# Patient Record
Sex: FEMALE | Race: BLACK OR AFRICAN AMERICAN | NOT HISPANIC OR LATINO | Employment: OTHER | URBAN - METROPOLITAN AREA
[De-identification: names, ages, dates, MRNs, and addresses within clinical notes are randomized per-mention and may not be internally consistent; named-entity substitution may affect disease eponyms.]

---

## 2017-01-30 RX ORDER — ALPRAZOLAM 1 MG/1
1 TABLET ORAL NIGHTLY
Qty: 90 TABLET | Refills: 1 | Status: SHIPPED | OUTPATIENT
Start: 2017-01-30 | End: 2017-05-16 | Stop reason: SDUPTHER

## 2017-01-30 NOTE — TELEPHONE ENCOUNTER
----- Message from Joyd Schwab sent at 1/30/2017  2:38 PM CST -----  Contact: Patient  Refill    alprazolam (XANAX) 1 MG tablet 90 tablet 1 3/29/2016  No  Sig - Route: Take 1 tablet (1 mg total) by mouth nightly. - Oral    90 day fills    [facility] [address] [phone] (Phone) [phone] (Fax)    You can reach the patient at [phone], cell [phone].    Thanks!

## 2017-03-15 RX ORDER — ERGOCALCIFEROL 1.25 MG/1
CAPSULE ORAL
Qty: 4 CAPSULE | Refills: 11 | Status: SHIPPED | OUTPATIENT
Start: 2017-03-15 | End: 2018-03-30 | Stop reason: SDUPTHER

## 2017-04-19 ENCOUNTER — TELEPHONE (OUTPATIENT)
Dept: INTERNAL MEDICINE | Facility: CLINIC | Age: 72
End: 2017-04-19

## 2017-04-19 DIAGNOSIS — I10 ESSENTIAL HYPERTENSION: ICD-10-CM

## 2017-04-19 DIAGNOSIS — Z12.31 ENCOUNTER FOR SCREENING MAMMOGRAM FOR MALIGNANT NEOPLASM OF BREAST: Primary | ICD-10-CM

## 2017-04-19 DIAGNOSIS — R73.03 PRE-DIABETES: ICD-10-CM

## 2017-04-19 NOTE — TELEPHONE ENCOUNTER
----- Message from Belen Liza sent at 4/19/2017  2:12 PM CDT -----  Contact: call  [phone] or cell  [phone]  Patient would like to get a referral.  Does the patient already have the specialty clinic appointment scheduled:    If yes, what date is the appointment scheduled:     Referral to what specialty:  mammo  Reason (be specific):  annual  Does the patient want the referral with a specific physician:  [facility]  [facility]   Comments:  Pt would like to have done on mon may 1, 17

## 2017-04-19 NOTE — TELEPHONE ENCOUNTER
----- Message from Belen De Jesus sent at 4/19/2017  2:14 PM CDT -----  Contact: [phone]  Doctor appointment and lab have been scheduled.  Please link lab orders to the lab appointment.  Date of doctor appointment:    Physical or EP:    Date of lab appointment:  05/01/2017  Comments: pt called and stated that it was time for her to have labs done,

## 2017-05-01 ENCOUNTER — HOSPITAL ENCOUNTER (OUTPATIENT)
Dept: RADIOLOGY | Facility: HOSPITAL | Age: 72
Discharge: HOME | End: 2017-05-01
Attending: INTERNAL MEDICINE
Payer: MEDICARE

## 2017-05-01 DIAGNOSIS — Z12.31 ENCOUNTER FOR SCREENING MAMMOGRAM FOR MALIGNANT NEOPLASM OF BREAST: ICD-10-CM

## 2017-05-01 PROCEDURE — 77067 SCR MAMMO BI INCL CAD: CPT | Mod: 26,,, | Performed by: RADIOLOGY

## 2017-05-01 PROCEDURE — 77067 SCR MAMMO BI INCL CAD: CPT | Mod: TC

## 2017-05-16 ENCOUNTER — OFFICE VISIT (OUTPATIENT)
Dept: INTERNAL MEDICINE | Facility: CLINIC | Age: 72
End: 2017-05-16
Payer: MEDICARE

## 2017-05-16 VITALS
HEART RATE: 55 BPM | DIASTOLIC BLOOD PRESSURE: 80 MMHG | HEIGHT: 65 IN | SYSTOLIC BLOOD PRESSURE: 140 MMHG | BODY MASS INDEX: 34.27 KG/M2 | OXYGEN SATURATION: 98 % | WEIGHT: 205.69 LBS

## 2017-05-16 DIAGNOSIS — Z86.39 HISTORY OF DIABETES MELLITUS, TYPE II: Primary | ICD-10-CM

## 2017-05-16 DIAGNOSIS — E78.5 HYPERLIPIDEMIA, UNSPECIFIED HYPERLIPIDEMIA TYPE: ICD-10-CM

## 2017-05-16 DIAGNOSIS — E11.59 HYPERTENSION ASSOCIATED WITH DIABETES: ICD-10-CM

## 2017-05-16 DIAGNOSIS — I15.2 HYPERTENSION ASSOCIATED WITH DIABETES: ICD-10-CM

## 2017-05-16 DIAGNOSIS — F41.9 ANXIETY: ICD-10-CM

## 2017-05-16 PROCEDURE — 99999 PR PBB SHADOW E&M-EST. PATIENT-LVL III: CPT | Mod: PBBFAC,,, | Performed by: INTERNAL MEDICINE

## 2017-05-16 PROCEDURE — 99213 OFFICE O/P EST LOW 20 MIN: CPT | Mod: PBBFAC | Performed by: INTERNAL MEDICINE

## 2017-05-16 PROCEDURE — 99215 OFFICE O/P EST HI 40 MIN: CPT | Mod: S$PBB,,, | Performed by: INTERNAL MEDICINE

## 2017-05-16 RX ORDER — ALPRAZOLAM 1 MG/1
1 TABLET ORAL NIGHTLY
Qty: 90 TABLET | Refills: 1 | Status: SHIPPED | OUTPATIENT
Start: 2017-05-16 | End: 2017-12-27 | Stop reason: SDUPTHER

## 2017-05-16 RX ORDER — OMEPRAZOLE 20 MG/1
20 CAPSULE, DELAYED RELEASE ORAL DAILY
COMMUNITY
End: 2019-04-15 | Stop reason: SDUPTHER

## 2017-05-16 RX ORDER — LOSARTAN POTASSIUM AND HYDROCHLOROTHIAZIDE 25; 100 MG/1; MG/1
1 TABLET ORAL DAILY
Qty: 90 TABLET | Refills: 3 | Status: SHIPPED | OUTPATIENT
Start: 2017-05-16 | End: 2018-04-20 | Stop reason: SDUPTHER

## 2017-05-16 RX ORDER — CARVEDILOL 25 MG/1
25 TABLET ORAL 2 TIMES DAILY WITH MEALS
Qty: 60 TABLET | Refills: 11 | Status: SHIPPED | OUTPATIENT
Start: 2017-05-16 | End: 2018-03-05 | Stop reason: SDUPTHER

## 2017-05-16 RX ORDER — ATENOLOL 50 MG/1
50 TABLET ORAL DAILY
Qty: 90 TABLET | Refills: 3 | Status: CANCELLED | OUTPATIENT
Start: 2017-05-16

## 2017-05-16 NOTE — PROGRESS NOTES
"Subjective:       Patient ID: Caprice Gutierrez is a 71 y.o. female.    Chief Complaint:  F/u htn, carbohydrate intolerance  HPI   Long h/o htn and pre-diabetes.     She hasn't checked BP.     Glucose always below 112.  Eating freely.     BMI 34 but wt fairly stable.    Stressed about grand daughter who is moving to [place] with boyfriend.    Gerd.  Taking otc omeprazole.    Takes a "crumb" of xanax every morning only.  Review of Systems   Constitutional: Negative for fever and unexpected weight change.   HENT: Positive for voice change. Negative for congestion and postnasal drip.    Eyes: Negative for pain, discharge and visual disturbance.   Respiratory: Negative for cough, chest tightness, shortness of breath and wheezing.    Cardiovascular: Negative for chest pain and leg swelling.   Gastrointestinal: Negative for abdominal pain, anal bleeding, constipation, diarrhea, nausea and vomiting.   Genitourinary: Negative for difficulty urinating, dysuria, flank pain, frequency, hematuria and urgency.   Musculoskeletal: Positive for arthralgias (R hip pain on occasion.  ). Negative for back pain and neck pain.   Skin: Negative for rash.   Neurological: Negative for headaches.   Psychiatric/Behavioral: Negative for dysphoric mood and sleep disturbance. The patient is nervous/anxious (A worrier).        Objective:    repeat BP  18/80  Physical Exam   Constitutional: She is oriented to person, place, and time. She appears well-developed and well-nourished. No distress.   HENT:   Head: Normocephalic and atraumatic.   Right Ear: External ear normal.   Left Ear: External ear normal.   Nose: Nose normal.   Mouth/Throat: Oropharynx is clear and moist.   Eyes: Conjunctivae and EOM are normal. Pupils are equal, round, and reactive to light. Right eye exhibits no discharge. Left eye exhibits no discharge. No scleral icterus.   Neck: Normal range of motion. Neck supple. No JVD present. No thyromegaly present. "   Cardiovascular: Normal rate, regular rhythm and normal heart sounds.  Exam reveals no gallop.    No murmur heard.  Pulmonary/Chest: Effort normal and breath sounds normal. No respiratory distress. She has no wheezes. She has no rales.   Abdominal: Soft. Bowel sounds are normal. She exhibits no distension and no mass. There is no tenderness. There is no rebound and no guarding.   Genitourinary: No breast tenderness, discharge or bleeding.   Musculoskeletal: Normal range of motion. She exhibits no edema or tenderness.   Lymphadenopathy:     She has no cervical adenopathy.   Neurological: She is alert and oriented to person, place, and time. No cranial nerve deficit. Coordination normal.   Skin: Skin is warm and dry. No rash noted.   Psychiatric: She has a normal mood and affect. Her behavior is normal. Judgment and thought content normal.       Results for orders placed or performed in visit on 05/01/17   Hemoglobin A1c   Result Value Ref Range    Hemoglobin A1C 6.1 4.5 - 6.2 %    Estimated Avg Glucose 128 68 - 131 mg/dL   Basic metabolic panel   Result Value Ref Range    Sodium 140 136 - 145 mmol/L    Potassium 4.1 3.5 - 5.1 mmol/L    Chloride 100 95 - 110 mmol/L    CO2 29 23 - 29 mmol/L    Glucose 105 70 - 110 mg/dL    BUN, Bld 19 8 - 23 mg/dL    Creatinine 1.0 0.5 - 1.4 mg/dL    Calcium 9.5 8.7 - 10.5 mg/dL    Anion Gap 11 8 - 16 mmol/L    eGFR if African American >60 >60 mL/min/1.73 m^2    eGFR if non African American 57 (A) >60 mL/min/1.73 m^2     Assessment:       1. History of diabetes mellitus, type II    2. Hypertension associated with diabetes    3. Hyperlipidemia, unspecified hyperlipidemia type    4. Anxiety        Plan:       Caprice was seen today for annual exam.    Diagnoses and all orders for this visit:    History of diabetes mellitus, type II    Hypertension associated with diabetes    Hyperlipidemia, unspecified hyperlipidemia type    Anxiety    Other orders  -     losartan-hydrochlorothiazide  100-25 mg (HYZAAR) 100-25 mg per tablet; Take 1 tablet by mouth once daily.  -     alprazolam (XANAX) 1 MG tablet; Take 1 tablet (1 mg total) by mouth nightly.  -     Cancel: atenolol (TENORMIN) 50 MG tablet; Take 1 tablet (50 mg total) by mouth once daily.  -     carvedilol (COREG) 25 MG tablet; Take 1 tablet (25 mg total) by mouth 2 (two) times daily with meals.       Ophthalmologist: Dr Herman Song

## 2017-05-16 NOTE — MR AVS SNAPSHOT
[facility]  [address]  [facility] [address]  Phone: [phone]  Fax: [phone]                  Caprice Gutierrez   2017 10:00 AM   Office Visit    Description:  Female : 1945   Provider:  Nayely Garcia MD   Department:  [facility]           Reason for Visit     Annual Exam           Diagnoses this Visit        Comments    History of diabetes mellitus, type II    -  Primary     Hypertension associated with diabetes         Hyperlipidemia, unspecified hyperlipidemia type         Anxiety                To Do List           Future Appointments        Provider Department Dept Phone    2017 9:10 AM Nayely Garcia MD [facility] [phone]      Goals (5 Years of Data)     None      Follow-Up and Disposition     Return in about 6 weeks (around 2017).       These Medications        Disp Refills Start End    losartan-hydrochlorothiazide 100-25 mg (HYZAAR) 100-25 mg per tablet 90 tablet 3 2017     Take 1 tablet by mouth once daily. - Oral    Pharmacy: [place] [id number] Ph #: [phone]       alprazolam (XANAX) 1 MG tablet 90 tablet 1 2017     Take 1 tablet (1 mg total) by mouth nightly. - Oral    Pharmacy: [place] [id number] Ph #: [phone]       carvedilol (COREG) 25 MG tablet 60 tablet 11 2017    Take 1 tablet (25 mg total) by mouth 2 (two) times daily with meals. - Oral    Pharmacy: [place] [id number] Ph #: [phone]         [facility]     [facility] On Call Nurse Care Line -  Assistance  Unless otherwise directed by your provider, please contact [facility], our nurse care line that is available for  assistance.     Registered nurses in the [facility] provide: appointment scheduling, clinical  advisement, health education, and other advisory services.  Call: [phone] (toll free)               Medications           Message regarding Medications     Verify the changes and/or additions to your medication regime listed below are the same as discussed with your clinician today.  If any of these changes or additions are incorrect, please notify your healthcare provider.        START taking these NEW medications        Refills    carvedilol (COREG) 25 MG tablet 11    Sig: Take 1 tablet (25 mg total) by mouth 2 (two) times daily with meals.    Class: Normal    Route: Oral      CHANGE how you are taking these medications     Start Taking Instead of    losartan-hydrochlorothiazide 100-25 mg (HYZAAR) 100-25 mg per tablet losartan-hydrochlorothiazide 100-25 mg (HYZAAR) 100-25 mg per tablet    Dosage:  Take 1 tablet by mouth once daily. Dosage:  TAKE 1 TABLET BY MOUTH EVERY DAY    Reason for Change:  Reorder       STOP taking these medications     esomeprazole (NEXIUM) 40 MG capsule TAKE 1 CAPSULE BY MOUTH EVERY DAY BEFORE BREAKFAST    meloxicam (MOBIC) 7.5 MG tablet     atenolol (TENORMIN) 50 MG tablet TAKE 1 TABLET BY MOUTH EVERY DAY           Verify that the below list of medications is an accurate representation of the medications you are currently taking.  If none reported, the list may be blank. If incorrect, please contact your healthcare provider. Carry this list with you in case of emergency.           Current Medications     alprazolam (XANAX) 1 MG tablet Take 1 tablet (1 mg total) by mouth nightly.    CONTOUR TEST STRIPS Strp TEST 1-3 TIMES DAILY AS DIRECTED    lancets (MICROLET LANCET) Misc 1 lancet by Misc.(Non-Drug; Combo Route) route 3 (three) times daily.    losartan-hydrochlorothiazide 100-25 mg (HYZAAR) 100-25 mg per tablet Take 1 tablet by mouth once daily.    omeprazole (PRILOSEC) 20 MG capsule Take 20 mg by mouth once daily.    VITAMIN D2 50,000 unit capsule TAKE 1 CAPSULE BY MOUTH BY MOUTH  "EVERY 7 DAYS    carvedilol (COREG) 25 MG tablet Take 1 tablet (25 mg total) by mouth 2 (two) times daily with meals.           Clinical Reference Information           Your Vitals Were     BP Pulse Height Weight SpO2 BMI    140/80 (BP Location: Right arm, Patient Position: Sitting, BP Method: Manual) 55 5' 5" (1.651 m) 93.3 kg (205 lb 11 oz) 98% 34.23 kg/m2      Blood Pressure          Most Recent Value    BP  (!)  140/80      Allergies as of 5/16/2017     Ace Inhibitors    Lisinopril      Immunizations Administered on Date of Encounter - 5/16/2017     None      Instructions    Change Atenolol to Carvedilol - 1 tab twice a day.       Language Assistance Services     ATTENTION: Language assistance services are available, free of charge. Please call [phone].      ATENCIÓN: Si allenla jean, tiene a gatica disposición servicios gratuitos de asistencia lingüística. Llame al [phone].     CHÚ Ý: N?u b?n nói Ti?ng Vi?t, có các d?ch v? h? tr? ngôn ng? mi?n phí dành cho b?n. G?i s? [phone].         [facility] complies with applicable Federal civil rights laws and does not discriminate on the basis of race, color, national origin, age, disability, or sex.        "

## 2017-06-26 ENCOUNTER — OFFICE VISIT (OUTPATIENT)
Dept: INTERNAL MEDICINE | Facility: CLINIC | Age: 72
End: 2017-06-26
Payer: MEDICARE

## 2017-06-26 VITALS
DIASTOLIC BLOOD PRESSURE: 82 MMHG | BODY MASS INDEX: 34.89 KG/M2 | HEIGHT: 65 IN | OXYGEN SATURATION: 98 % | HEART RATE: 64 BPM | WEIGHT: 209.44 LBS | SYSTOLIC BLOOD PRESSURE: 132 MMHG

## 2017-06-26 DIAGNOSIS — I15.2 HYPERTENSION ASSOCIATED WITH DIABETES: Primary | ICD-10-CM

## 2017-06-26 DIAGNOSIS — R53.83 FATIGUE, UNSPECIFIED TYPE: ICD-10-CM

## 2017-06-26 DIAGNOSIS — E11.59 HYPERTENSION ASSOCIATED WITH DIABETES: Primary | ICD-10-CM

## 2017-06-26 PROCEDURE — 99213 OFFICE O/P EST LOW 20 MIN: CPT | Mod: S$PBB,,, | Performed by: INTERNAL MEDICINE

## 2017-06-26 PROCEDURE — 99213 OFFICE O/P EST LOW 20 MIN: CPT | Mod: PBBFAC | Performed by: INTERNAL MEDICINE

## 2017-06-26 PROCEDURE — 1159F MED LIST DOCD IN RCRD: CPT | Mod: ,,, | Performed by: INTERNAL MEDICINE

## 2017-06-26 PROCEDURE — 3044F HG A1C LEVEL LT 7.0%: CPT | Mod: ,,, | Performed by: INTERNAL MEDICINE

## 2017-06-26 PROCEDURE — 1126F AMNT PAIN NOTED NONE PRSNT: CPT | Mod: ,,, | Performed by: INTERNAL MEDICINE

## 2017-06-26 PROCEDURE — 4010F ACE/ARB THERAPY RXD/TAKEN: CPT | Mod: ,,, | Performed by: INTERNAL MEDICINE

## 2017-06-26 PROCEDURE — 99999 PR PBB SHADOW E&M-EST. PATIENT-LVL III: CPT | Mod: PBBFAC,,, | Performed by: INTERNAL MEDICINE

## 2017-06-26 NOTE — PROGRESS NOTES
Subjective:       Patient ID: Caprice Gutierrez is a 71 y.o. female.    Chief Complaint: Follow-up (History of diabetes mellitus, type II)    HPI   She c/o sleeping excessively with carvedilol. She also has to take it with food, which she believes makes her eat more..      Harder to get up in am and go for her walk since starting carvedilol.        However, BP is controlled  Review of Systems   Constitutional: Negative for activity change, fever and unexpected weight change.   HENT: Negative for congestion and postnasal drip.    Eyes: Negative for pain, discharge and visual disturbance.   Respiratory: Negative for cough, chest tightness, shortness of breath and wheezing.    Cardiovascular: Negative for chest pain and leg swelling.   Gastrointestinal: Negative for abdominal pain, constipation, diarrhea and nausea.   Genitourinary: Negative for difficulty urinating, dysuria and hematuria.   Musculoskeletal: Negative for back pain.   Skin: Negative for rash.   Neurological: Negative for headaches.   Psychiatric/Behavioral: Negative for dysphoric mood and sleep disturbance. The patient is not nervous/anxious.        Objective:      Physical Exam   Constitutional: She appears well-developed and well-nourished. No distress.   Psychiatric: She has a normal mood and affect. Her behavior is normal.       Assessment:       1. Hypertension associated with diabetes    2. Fatigue, unspecified type        Plan:       Caprice was seen today for follow-up.    Diagnoses and all orders for this visit:    Hypertension associated with diabetes    Fatigue, unspecified type       Carvedilol SR not covered by her insurance, so decrease carvedilol to half a tab bid.  If BP not controlled start a CCB.

## 2017-08-28 ENCOUNTER — OFFICE VISIT (OUTPATIENT)
Dept: INTERNAL MEDICINE | Facility: CLINIC | Age: 72
End: 2017-08-28
Payer: MEDICARE

## 2017-08-28 VITALS
WEIGHT: 209.5 LBS | BODY MASS INDEX: 34.91 KG/M2 | SYSTOLIC BLOOD PRESSURE: 124 MMHG | DIASTOLIC BLOOD PRESSURE: 78 MMHG | HEART RATE: 64 BPM | HEIGHT: 65 IN

## 2017-08-28 DIAGNOSIS — E11.59 HYPERTENSION ASSOCIATED WITH DIABETES: Primary | ICD-10-CM

## 2017-08-28 DIAGNOSIS — Z11.59 NEED FOR HEPATITIS C SCREENING TEST: ICD-10-CM

## 2017-08-28 DIAGNOSIS — I15.2 HYPERTENSION ASSOCIATED WITH DIABETES: Primary | ICD-10-CM

## 2017-08-28 DIAGNOSIS — E78.5 HYPERLIPIDEMIA, UNSPECIFIED HYPERLIPIDEMIA TYPE: ICD-10-CM

## 2017-08-28 DIAGNOSIS — R73.03 PREDIABETES: ICD-10-CM

## 2017-08-28 PROCEDURE — 4010F ACE/ARB THERAPY RXD/TAKEN: CPT | Mod: ,,, | Performed by: INTERNAL MEDICINE

## 2017-08-28 PROCEDURE — 99214 OFFICE O/P EST MOD 30 MIN: CPT | Mod: S$PBB,,, | Performed by: INTERNAL MEDICINE

## 2017-08-28 PROCEDURE — 3074F SYST BP LT 130 MM HG: CPT | Mod: ,,, | Performed by: INTERNAL MEDICINE

## 2017-08-28 PROCEDURE — 99999 PR PBB SHADOW E&M-EST. PATIENT-LVL III: CPT | Mod: PBBFAC,,, | Performed by: INTERNAL MEDICINE

## 2017-08-28 PROCEDURE — 3078F DIAST BP <80 MM HG: CPT | Mod: ,,, | Performed by: INTERNAL MEDICINE

## 2017-08-28 PROCEDURE — 3044F HG A1C LEVEL LT 7.0%: CPT | Mod: ,,, | Performed by: INTERNAL MEDICINE

## 2017-08-28 PROCEDURE — 1159F MED LIST DOCD IN RCRD: CPT | Mod: ,,, | Performed by: INTERNAL MEDICINE

## 2017-08-28 PROCEDURE — 1126F AMNT PAIN NOTED NONE PRSNT: CPT | Mod: ,,, | Performed by: INTERNAL MEDICINE

## 2017-08-28 PROCEDURE — 99213 OFFICE O/P EST LOW 20 MIN: CPT | Mod: PBBFAC | Performed by: INTERNAL MEDICINE

## 2017-08-28 NOTE — PROGRESS NOTES
Subjective:       Patient ID: Caprice Gutierrez is a 71 y.o. female.    Chief Complaint: Hypertension and Finger Pain (Left Hand)    HPI   Doing well.  BP well controlled, and fatigue better with carvedilol - half a tab bid    Also c/o sticking L 3rd finger.  Doesn't want intervention.    Taking omeprazole.  No Gerd, abd pain.    Frustrated by wt.  Trying to lose some.    Borderline DM.  No foot numbness.    BMI 35.  Walking , working in garden.    Walking 5 days a week.      Review of Systems   Constitutional: Negative for fever and unexpected weight change.   HENT: Negative for congestion and postnasal drip.    Eyes: Negative for pain, discharge and visual disturbance.   Respiratory: Negative for cough, chest tightness, shortness of breath and wheezing.    Cardiovascular: Negative for chest pain and leg swelling.   Gastrointestinal: Negative for abdominal pain, constipation, diarrhea and nausea.   Genitourinary: Negative for difficulty urinating, dysuria and hematuria.   Skin: Negative for rash.   Neurological: Negative for headaches.   Psychiatric/Behavioral: Negative for dysphoric mood and sleep disturbance. The patient is not nervous/anxious.        Objective:      Physical Exam   Constitutional: She is oriented to person, place, and time. She appears well-developed and well-nourished.   Eyes: No scleral icterus.   Neck: No JVD present. No thyromegaly present.   Cardiovascular: Normal rate, regular rhythm and normal heart sounds.    Pulmonary/Chest: Effort normal and breath sounds normal. No respiratory distress. She has no wheezes. She has no rales.   Abdominal: Soft. She exhibits no mass. There is no tenderness.   Musculoskeletal: She exhibits no edema.   Neurological: She is alert and oriented to person, place, and time.   Psychiatric: She has a normal mood and affect. Her behavior is normal.       Protective Sensation (w/ 10 gram monofilament):  Right: Intact  Left: Intact    Visual Inspection:  Normal  -  Bilateral    Pedal Pulses:   Right: Present  Left: Present    Posterior tibialis:   Right:Absent  Left: Absent    Assessment:       1. Hypertension associated with diabetes    2. Prediabetes    3. Hyperlipidemia, unspecified hyperlipidemia type    4. Need for hepatitis C screening test    5. BMI 34.0-34.9,adult        Plan:       Caprice was seen today for hypertension and finger pain.    Diagnoses and all orders for this visit:    Hypertension associated with diabetes    Prediabetes  -     Comprehensive metabolic panel; Future  -     CBC auto differential; Future  -     Hemoglobin A1c; Future    Hyperlipidemia, unspecified hyperlipidemia type  -     Lipid panel; Future    Need for hepatitis C screening test  -     Hepatitis C antibody; Future    BMI 34.0-34.9,adult       wt loss reviewed

## 2017-12-27 RX ORDER — ALPRAZOLAM 1 MG/1
TABLET ORAL
Qty: 90 TABLET | Refills: 1 | Status: SHIPPED | OUTPATIENT
Start: 2017-12-27 | End: 2018-03-05 | Stop reason: SDUPTHER

## 2017-12-27 NOTE — TELEPHONE ENCOUNTER
----- Message from Cande Wilson sent at 12/27/2017  3:40 PM CST -----  Contact: Tracy stephens [phone]  RX request - refill or new RX.  Is this a refill or new RX:  Refill   RX name and strength: alprazolam (XANAX) 1 MG tablet  Directions: Take 1 tablet (1 mg total) by mouth nightly. - Oral  Is this a 30 day or 90 day RX:    Pharmacy name and phone # : [facility] [phone] (phone) [phone] (Fax)  Comments:

## 2018-02-21 ENCOUNTER — LAB VISIT (OUTPATIENT)
Dept: LAB | Facility: HOSPITAL | Age: 73
End: 2018-02-21
Attending: INTERNAL MEDICINE
Payer: MEDICARE

## 2018-02-21 DIAGNOSIS — E78.5 HYPERLIPIDEMIA, UNSPECIFIED HYPERLIPIDEMIA TYPE: ICD-10-CM

## 2018-02-21 DIAGNOSIS — Z11.59 NEED FOR HEPATITIS C SCREENING TEST: ICD-10-CM

## 2018-02-21 DIAGNOSIS — R73.03 PREDIABETES: ICD-10-CM

## 2018-02-21 LAB
ALBUMIN SERPL BCP-MCNC: 3.7 G/DL
ALP SERPL-CCNC: 70 U/L
ALT SERPL W/O P-5'-P-CCNC: 13 U/L
ANION GAP SERPL CALC-SCNC: 8 MMOL/L
AST SERPL-CCNC: 15 U/L
BASOPHILS # BLD AUTO: 0.03 K/UL
BASOPHILS NFR BLD: 0.6 %
BILIRUB SERPL-MCNC: 0.7 MG/DL
BUN SERPL-MCNC: 15 MG/DL
CALCIUM SERPL-MCNC: 9.5 MG/DL
CHLORIDE SERPL-SCNC: 100 MMOL/L
CHOLEST SERPL-MCNC: 203 MG/DL
CHOLEST/HDLC SERPL: 3.1 {RATIO}
CO2 SERPL-SCNC: 33 MMOL/L
CREAT SERPL-MCNC: 1 MG/DL
DIFFERENTIAL METHOD: ABNORMAL
EOSINOPHIL # BLD AUTO: 0.1 K/UL
EOSINOPHIL NFR BLD: 2.5 %
ERYTHROCYTE [DISTWIDTH] IN BLOOD BY AUTOMATED COUNT: 16.7 %
EST. GFR  (AFRICAN AMERICAN): >60 ML/MIN/1.73 M^2
EST. GFR  (NON AFRICAN AMERICAN): 56 ML/MIN/1.73 M^2
ESTIMATED AVG GLUCOSE: 120 MG/DL
GLUCOSE SERPL-MCNC: 103 MG/DL
HBA1C MFR BLD HPLC: 5.8 %
HCT VFR BLD AUTO: 34.5 %
HCV AB SERPL QL IA: NEGATIVE
HDLC SERPL-MCNC: 65 MG/DL
HDLC SERPL: 32 %
HGB BLD-MCNC: 11.4 G/DL
LDLC SERPL CALC-MCNC: 123.8 MG/DL
LYMPHOCYTES # BLD AUTO: 1.7 K/UL
LYMPHOCYTES NFR BLD: 33.8 %
MCH RBC QN AUTO: 28.3 PG
MCHC RBC AUTO-ENTMCNC: 33 G/DL
MCV RBC AUTO: 86 FL
MONOCYTES # BLD AUTO: 0.6 K/UL
MONOCYTES NFR BLD: 12.2 %
NEUTROPHILS # BLD AUTO: 2.6 K/UL
NEUTROPHILS NFR BLD: 50.7 %
NONHDLC SERPL-MCNC: 138 MG/DL
NRBC BLD-RTO: 0 /100 WBC
PLATELET # BLD AUTO: 329 K/UL
PMV BLD AUTO: 10.1 FL
POTASSIUM SERPL-SCNC: 3 MMOL/L
PROT SERPL-MCNC: 7.4 G/DL
RBC # BLD AUTO: 4.03 M/UL
SODIUM SERPL-SCNC: 141 MMOL/L
TRIGL SERPL-MCNC: 71 MG/DL
WBC # BLD AUTO: 5.15 K/UL

## 2018-02-21 PROCEDURE — 83036 HEMOGLOBIN GLYCOSYLATED A1C: CPT

## 2018-02-21 PROCEDURE — 80053 COMPREHEN METABOLIC PANEL: CPT

## 2018-02-21 PROCEDURE — 80061 LIPID PANEL: CPT

## 2018-02-21 PROCEDURE — 36415 COLL VENOUS BLD VENIPUNCTURE: CPT

## 2018-02-21 PROCEDURE — 86803 HEPATITIS C AB TEST: CPT

## 2018-02-21 PROCEDURE — 85025 COMPLETE CBC W/AUTO DIFF WBC: CPT

## 2018-02-23 ENCOUNTER — TELEPHONE (OUTPATIENT)
Dept: INTERNAL MEDICINE | Facility: CLINIC | Age: 73
End: 2018-02-23

## 2018-02-23 NOTE — TELEPHONE ENCOUNTER
Attempted to contact pt no success, left voice message.  Pt need f/u appt. Mailed out letter to call and schedule appt.

## 2018-02-23 NOTE — LETTER
February 23, 2018    Caprice Gutierrez  [address]  [place] [address]                                         [facility]  [address] [place] [address]                                 Dear Mrs. Gutierrez,     I have been unable to contact you by phone to discuss scheduling an appointment for a follow up visit with Dr. Nayely Garcia MD. At your earliest availability please give the office a call at [phone].        I look forward to hearing from you to schedule the appointment.      Sincerely,    Sheryl Bedolla   [facility] INTER[facility]  [facility] [place]

## 2018-03-05 ENCOUNTER — OFFICE VISIT (OUTPATIENT)
Dept: INTERNAL MEDICINE | Facility: CLINIC | Age: 73
End: 2018-03-05
Payer: MEDICARE

## 2018-03-05 VITALS
HEIGHT: 65 IN | SYSTOLIC BLOOD PRESSURE: 125 MMHG | BODY MASS INDEX: 33.98 KG/M2 | DIASTOLIC BLOOD PRESSURE: 80 MMHG | TEMPERATURE: 99 F | WEIGHT: 203.94 LBS | HEART RATE: 72 BPM

## 2018-03-05 DIAGNOSIS — I10 HYPERTENSION, UNSPECIFIED TYPE: Primary | ICD-10-CM

## 2018-03-05 DIAGNOSIS — R73.03 PREDIABETES: ICD-10-CM

## 2018-03-05 DIAGNOSIS — D64.9 ANEMIA, UNSPECIFIED TYPE: ICD-10-CM

## 2018-03-05 DIAGNOSIS — E55.9 VITAMIN D DEFICIENCY: ICD-10-CM

## 2018-03-05 DIAGNOSIS — E87.6 HYPOKALEMIA: ICD-10-CM

## 2018-03-05 PROCEDURE — 99213 OFFICE O/P EST LOW 20 MIN: CPT | Mod: PBBFAC | Performed by: INTERNAL MEDICINE

## 2018-03-05 PROCEDURE — 99999 PR PBB SHADOW E&M-EST. PATIENT-LVL III: CPT | Mod: PBBFAC,,, | Performed by: INTERNAL MEDICINE

## 2018-03-05 PROCEDURE — 99214 OFFICE O/P EST MOD 30 MIN: CPT | Mod: S$PBB,,, | Performed by: INTERNAL MEDICINE

## 2018-03-05 RX ORDER — CARVEDILOL 12.5 MG/1
TABLET ORAL
Qty: 60 TABLET | Refills: 11 | Status: SHIPPED | OUTPATIENT
Start: 2018-03-05 | End: 2018-11-14 | Stop reason: SDUPTHER

## 2018-03-05 RX ORDER — ALPRAZOLAM 1 MG/1
1 TABLET ORAL NIGHTLY
Qty: 90 TABLET | Refills: 1 | Status: SHIPPED | OUTPATIENT
Start: 2018-03-05 | End: 2018-07-02 | Stop reason: SDUPTHER

## 2018-03-05 NOTE — PROGRESS NOTES
Subjective:       Patient ID: Caprice Gutierrez is a 72 y.o. female.    Chief Complaint: Follow-up    HPI   Long standing htn.  Taking half carvedilol bid.  Walking regularly.    She has h/o prediabetes.  Also with chronic xanax, which she takes occas, not daily.  Labs reviewed.  Eats banana daily.  H/o hypokalemia.      hgb 11.4.  No blood donation.  No change in bowel habits.    colonoscopy 7/2016.    gerd controlled with nexium.  Will buy otc.     Review of Systems   Constitutional: Negative for fever and unexpected weight change.   HENT: Negative for congestion and postnasal drip.    Eyes: Negative for pain, discharge and visual disturbance.   Respiratory: Negative for cough, chest tightness, shortness of breath and wheezing.    Cardiovascular: Negative for chest pain and leg swelling.   Gastrointestinal: Negative for abdominal pain, constipation, diarrhea and nausea.   Genitourinary: Negative for difficulty urinating, dysuria and hematuria.   Skin: Negative for rash.   Neurological: Negative for headaches.   Psychiatric/Behavioral: Negative for dysphoric mood and sleep disturbance. The patient is not nervous/anxious.        Objective:      Physical Exam   Constitutional: She is oriented to person, place, and time. She appears well-developed and well-nourished. No distress.   Eyes: Pupils are equal, round, and reactive to light.   Cardiovascular: Normal rate, regular rhythm and normal heart sounds.    Pulmonary/Chest: Effort normal.   Neurological: She is alert and oriented to person, place, and time. No cranial nerve deficit.   Skin: No rash noted. No erythema.       Results for orders placed or performed in visit on 02/21/18   Comprehensive metabolic panel   Result Value Ref Range    Sodium 141 136 - 145 mmol/L    Potassium 3.0 (L) 3.5 - 5.1 mmol/L    Chloride 100 95 - 110 mmol/L    CO2 33 (H) 23 - 29 mmol/L    Glucose 103 70 - 110 mg/dL    BUN, Bld 15 8 - 23 mg/dL    Creatinine 1.0 0.5 - 1.4 mg/dL     Calcium 9.5 8.7 - 10.5 mg/dL    Total Protein 7.4 6.0 - 8.4 g/dL    Albumin 3.7 3.5 - 5.2 g/dL    Total Bilirubin 0.7 0.1 - 1.0 mg/dL    Alkaline Phosphatase 70 55 - 135 U/L    AST 15 10 - 40 U/L    ALT 13 10 - 44 U/L    Anion Gap 8 8 - 16 mmol/L    eGFR if African American >60 >60 mL/min/1.73 m^2    eGFR if non African American 56 (A) >60 mL/min/1.73 m^2   CBC auto differential   Result Value Ref Range    WBC 5.15 3.90 - 12.70 K/uL    RBC 4.03 4.00 - 5.40 M/uL    Hemoglobin 11.4 (L) 12.0 - 16.0 g/dL    Hematocrit 34.5 (L) 37.0 - 48.5 %    MCV 86 82 - 98 fL    MCH 28.3 27.0 - 31.0 pg    MCHC 33.0 32.0 - 36.0 g/dL    RDW 16.7 (H) 11.5 - 14.5 %    Platelets 329 150 - 350 K/uL    MPV 10.1 9.2 - 12.9 fL    Gran # (ANC) 2.6 1.8 - 7.7 K/uL    Lymph # 1.7 1.0 - 4.8 K/uL    Mono # 0.6 0.3 - 1.0 K/uL    Eos # 0.1 0.0 - 0.5 K/uL    Baso # 0.03 0.00 - 0.20 K/uL    nRBC 0 0 /100 WBC    Gran% 50.7 38.0 - 73.0 %    Lymph% 33.8 18.0 - 48.0 %    Mono% 12.2 4.0 - 15.0 %    Eosinophil% 2.5 0.0 - 8.0 %    Basophil% 0.6 0.0 - 1.9 %    Differential Method Automated    Hemoglobin A1c   Result Value Ref Range    Hemoglobin A1C 5.8 (H) 4.0 - 5.6 %    Estimated Avg Glucose 120 68 - 131 mg/dL   Lipid panel   Result Value Ref Range    Cholesterol 203 (H) 120 - 199 mg/dL    Triglycerides 71 30 - 150 mg/dL    HDL 65 40 - 75 mg/dL    LDL Cholesterol 123.8 63.0 - 159.0 mg/dL    HDL/Chol Ratio 32.0 20.0 - 50.0 %    Total Cholesterol/HDL Ratio 3.1 2.0 - 5.0    Non-HDL Cholesterol 138 mg/dL   Hepatitis C antibody   Result Value Ref Range    Hepatitis C Ab Negative      Assessment:       1. Hypertension, unspecified type    2. Hypokalemia    3. Anemia, unspecified type    4. Prediabetes    5. Vitamin D deficiency        Plan:       Caprice was seen today for follow-up.    Diagnoses and all orders for this visit:    Hypertension, unspecified type    Hypokalemia  -     Potassium; Future    Anemia, unspecified type  -     CBC auto differential;  Future  -     Ferritin; Future  -     Iron and TIBC; Future  -     Reticulocytes; Future    Prediabetes  -     Microalbumin/creatinine urine ratio    Vitamin D deficiency  -     Vitamin D; Future    Other orders  -     carvedilol (COREG) 12.5 MG tablet; 1 tab po bid  -     ALPRAZolam (XANAX) 1 MG tablet; Take 1 tablet (1 mg total) by mouth nightly.

## 2018-03-20 ENCOUNTER — LAB VISIT (OUTPATIENT)
Dept: LAB | Facility: HOSPITAL | Age: 73
End: 2018-03-20
Attending: INTERNAL MEDICINE
Payer: MEDICARE

## 2018-03-20 DIAGNOSIS — D64.9 ANEMIA, UNSPECIFIED TYPE: ICD-10-CM

## 2018-03-20 DIAGNOSIS — E55.9 VITAMIN D DEFICIENCY: ICD-10-CM

## 2018-03-20 DIAGNOSIS — E87.6 HYPOKALEMIA: ICD-10-CM

## 2018-03-20 LAB
25(OH)D3+25(OH)D2 SERPL-MCNC: 38 NG/ML
BASOPHILS # BLD AUTO: 0.03 K/UL
BASOPHILS NFR BLD: 0.6 %
DIFFERENTIAL METHOD: ABNORMAL
EOSINOPHIL # BLD AUTO: 0.2 K/UL
EOSINOPHIL NFR BLD: 3.2 %
ERYTHROCYTE [DISTWIDTH] IN BLOOD BY AUTOMATED COUNT: 16.1 %
FERRITIN SERPL-MCNC: 134 NG/ML
HCT VFR BLD AUTO: 35.5 %
HGB BLD-MCNC: 11.9 G/DL
IRON SERPL-MCNC: 53 UG/DL
LYMPHOCYTES # BLD AUTO: 1.6 K/UL
LYMPHOCYTES NFR BLD: 32.1 %
MCH RBC QN AUTO: 28.1 PG
MCHC RBC AUTO-ENTMCNC: 33.5 G/DL
MCV RBC AUTO: 84 FL
MONOCYTES # BLD AUTO: 0.6 K/UL
MONOCYTES NFR BLD: 11.3 %
NEUTROPHILS # BLD AUTO: 2.7 K/UL
NEUTROPHILS NFR BLD: 52.8 %
PLATELET # BLD AUTO: 289 K/UL
PMV BLD AUTO: 10.2 FL
POTASSIUM SERPL-SCNC: 3.4 MMOL/L
RBC # BLD AUTO: 4.24 M/UL
RETICS/RBC NFR AUTO: 1.4 %
SATURATED IRON: 16 %
TOTAL IRON BINDING CAPACITY: 334 UG/DL
TRANSFERRIN SERPL-MCNC: 226 MG/DL
WBC # BLD AUTO: 5.05 K/UL

## 2018-03-20 PROCEDURE — 82728 ASSAY OF FERRITIN: CPT

## 2018-03-20 PROCEDURE — 85025 COMPLETE CBC W/AUTO DIFF WBC: CPT

## 2018-03-20 PROCEDURE — 82306 VITAMIN D 25 HYDROXY: CPT

## 2018-03-20 PROCEDURE — 85045 AUTOMATED RETICULOCYTE COUNT: CPT

## 2018-03-20 PROCEDURE — 84132 ASSAY OF SERUM POTASSIUM: CPT

## 2018-03-20 PROCEDURE — 36415 COLL VENOUS BLD VENIPUNCTURE: CPT

## 2018-03-20 PROCEDURE — 83540 ASSAY OF IRON: CPT

## 2018-03-30 RX ORDER — ERGOCALCIFEROL 1.25 MG/1
CAPSULE ORAL
Qty: 4 CAPSULE | Refills: 11 | Status: SHIPPED | OUTPATIENT
Start: 2018-03-30 | End: 2019-05-07 | Stop reason: SDUPTHER

## 2018-04-01 ENCOUNTER — TELEPHONE (OUTPATIENT)
Dept: INTERNAL MEDICINE | Facility: CLINIC | Age: 73
End: 2018-04-01

## 2018-04-01 DIAGNOSIS — D64.9 ANEMIA, UNSPECIFIED TYPE: ICD-10-CM

## 2018-04-01 DIAGNOSIS — E87.6 HYPOKALEMIA: Primary | ICD-10-CM

## 2018-04-02 NOTE — TELEPHONE ENCOUNTER
pls call - iron, vit d normal.  Mild anemia has improved.    Potassium slightly low, so eat half a banana daily.    Schedule bmp, cbc 3 mo with appt after

## 2018-04-20 RX ORDER — LOSARTAN POTASSIUM AND HYDROCHLOROTHIAZIDE 25; 100 MG/1; MG/1
TABLET ORAL
Qty: 90 TABLET | Refills: 3 | Status: SHIPPED | OUTPATIENT
Start: 2018-04-20 | End: 2019-04-15 | Stop reason: SDUPTHER

## 2018-05-01 ENCOUNTER — TELEPHONE (OUTPATIENT)
Dept: INTERNAL MEDICINE | Facility: CLINIC | Age: 73
End: 2018-05-01

## 2018-05-01 DIAGNOSIS — Z12.31 ENCOUNTER FOR SCREENING MAMMOGRAM FOR MALIGNANT NEOPLASM OF BREAST: Primary | ICD-10-CM

## 2018-05-01 NOTE — TELEPHONE ENCOUNTER
----- Message from Danica Arshad sent at 5/1/2018  4:11 PM CDT -----  Contact: self/[phone] home /[phone] cell  Pt needs orders put in for a mammogram. Please advise.      Thanks

## 2018-06-25 ENCOUNTER — HOSPITAL ENCOUNTER (OUTPATIENT)
Dept: RADIOLOGY | Facility: HOSPITAL | Age: 73
Discharge: HOME | End: 2018-06-25
Attending: INTERNAL MEDICINE
Payer: MEDICARE

## 2018-06-25 VITALS — BODY MASS INDEX: 33.82 KG/M2 | HEIGHT: 65 IN | WEIGHT: 203 LBS

## 2018-06-25 DIAGNOSIS — Z12.31 ENCOUNTER FOR SCREENING MAMMOGRAM FOR MALIGNANT NEOPLASM OF BREAST: ICD-10-CM

## 2018-06-25 PROCEDURE — 77067 SCR MAMMO BI INCL CAD: CPT | Mod: 26,,, | Performed by: RADIOLOGY

## 2018-06-25 PROCEDURE — 77067 SCR MAMMO BI INCL CAD: CPT | Mod: TC

## 2018-07-02 ENCOUNTER — OFFICE VISIT (OUTPATIENT)
Dept: INTERNAL MEDICINE | Facility: CLINIC | Age: 73
End: 2018-07-02
Payer: MEDICARE

## 2018-07-02 VITALS
BODY MASS INDEX: 34.8 KG/M2 | SYSTOLIC BLOOD PRESSURE: 128 MMHG | OXYGEN SATURATION: 98 % | DIASTOLIC BLOOD PRESSURE: 80 MMHG | WEIGHT: 208.88 LBS | HEIGHT: 65 IN | HEART RATE: 68 BPM

## 2018-07-02 DIAGNOSIS — I10 HYPERTENSION, UNSPECIFIED TYPE: ICD-10-CM

## 2018-07-02 DIAGNOSIS — D64.9 ANEMIA, UNSPECIFIED TYPE: ICD-10-CM

## 2018-07-02 DIAGNOSIS — F41.9 ANXIETY: ICD-10-CM

## 2018-07-02 DIAGNOSIS — I70.0 AORTIC ATHEROSCLEROSIS: ICD-10-CM

## 2018-07-02 DIAGNOSIS — R73.03 PREDIABETES: Primary | ICD-10-CM

## 2018-07-02 DIAGNOSIS — Z13.6 SCREENING FOR CARDIOVASCULAR CONDITION: ICD-10-CM

## 2018-07-02 DIAGNOSIS — E55.9 VITAMIN D DEFICIENCY: ICD-10-CM

## 2018-07-02 PROCEDURE — 99999 PR PBB SHADOW E&M-EST. PATIENT-LVL III: CPT | Mod: PBBFAC,,, | Performed by: INTERNAL MEDICINE

## 2018-07-02 PROCEDURE — 99214 OFFICE O/P EST MOD 30 MIN: CPT | Mod: S$PBB,,, | Performed by: INTERNAL MEDICINE

## 2018-07-02 PROCEDURE — 99213 OFFICE O/P EST LOW 20 MIN: CPT | Mod: PBBFAC | Performed by: INTERNAL MEDICINE

## 2018-07-02 RX ORDER — ASPIRIN 81 MG/1
81 TABLET ORAL DAILY
COMMUNITY

## 2018-07-02 RX ORDER — ALPRAZOLAM 1 MG/1
1 TABLET ORAL NIGHTLY
Qty: 30 TABLET | Refills: 1
Start: 2018-07-02 | End: 2018-10-02

## 2018-07-02 NOTE — PROGRESS NOTES
Subjective:       Patient ID: Caprice Gutierrez is a 72 y.o. female.    Chief Complaint: Follow-up (3m f/u)    HPI   Doing well. Walking regularly.    F/u hypokalemia and anemia.    corrected with half a banana daily.    She has pre-diabetes.  bmi 34.     Aware of importance of wt loss.     She has minimal atherosclerotic calcifications of aortic branch vessels.  She has refused statin tx but does take asa.    Nl MG.    Takes half a xanax out of habit every am.  Unable to get more than #30 per rx.  Review of Systems   Constitutional: Negative for fever and unexpected weight change.   HENT: Negative for congestion and postnasal drip.    Eyes: Negative for pain, discharge and visual disturbance.   Respiratory: Negative for cough, chest tightness, shortness of breath and wheezing.    Cardiovascular: Negative for chest pain and leg swelling.   Gastrointestinal: Negative for abdominal pain, constipation, diarrhea and nausea.   Genitourinary: Negative for difficulty urinating, dysuria and hematuria.   Skin: Negative for rash.   Neurological: Negative for headaches.   Psychiatric/Behavioral: Negative for dysphoric mood and sleep disturbance. The patient is not nervous/anxious.        Objective:      Physical Exam   Constitutional: She is oriented to person, place, and time. She appears well-developed and well-nourished. No distress.   Neurological: She is alert and oriented to person, place, and time.   Psychiatric: She has a normal mood and affect. Her behavior is normal.       Results for orders placed or performed in visit on 06/25/18   Basic metabolic panel   Result Value Ref Range    Sodium 139 136 - 145 mmol/L    Potassium 4.0 3.5 - 5.1 mmol/L    Chloride 102 95 - 110 mmol/L    CO2 29 23 - 29 mmol/L    Glucose 97 70 - 110 mg/dL    BUN, Bld 19 8 - 23 mg/dL    Creatinine 1.0 0.5 - 1.4 mg/dL    Calcium 9.5 8.7 - 10.5 mg/dL    Anion Gap 8 8 - 16 mmol/L    eGFR if African American >60 >60 mL/min/1.73 m^2    eGFR if  non  56 (A) >60 mL/min/1.73 m^2   CBC auto differential   Result Value Ref Range    WBC 5.52 3.90 - 12.70 K/uL    RBC 4.31 4.00 - 5.40 M/uL    Hemoglobin 12.2 12.0 - 16.0 g/dL    Hematocrit 36.1 (L) 37.0 - 48.5 %    MCV 84 82 - 98 fL    MCH 28.3 27.0 - 31.0 pg    MCHC 33.8 32.0 - 36.0 g/dL    RDW 16.1 (H) 11.5 - 14.5 %    Platelets 318 150 - 350 K/uL    MPV 9.9 9.2 - 12.9 fL    Gran # (ANC) 3.0 1.8 - 7.7 K/uL    Lymph # 1.8 1.0 - 4.8 K/uL    Mono # 0.6 0.3 - 1.0 K/uL    Eos # 0.2 0.0 - 0.5 K/uL    Baso # 0.03 0.00 - 0.20 K/uL    Gran% 53.7 38.0 - 73.0 %    Lymph% 31.7 18.0 - 48.0 %    Mono% 10.7 4.0 - 15.0 %    Eosinophil% 3.4 0.0 - 8.0 %    Basophil% 0.5 0.0 - 1.9 %    Differential Method Automated        Assessment:       1. Prediabetes    2. Hypertension, unspecified type    3. Anxiety    4. Vitamin D deficiency    5. Screening for cardiovascular condition    6. Anemia, unspecified type - resolved   7. BMI 34.0-34.9,adult        Plan:       Caprice was seen today for follow-up.    Diagnoses and all orders for this visit:    Prediabetes  -     Comprehensive metabolic panel; Future  -     Hemoglobin A1c; Future    Hypertension, unspecified type  -     CBC auto differential; Future    Anxiety    Vitamin D deficiency  -     Vitamin D; Future    Screening for cardiovascular condition  -     Lipid panel; Future    Anemia, unspecified type    BMI 34.0-34.9,adult    Aortic atherosclerosis    Other orders  -     ALPRAZolam (XANAX) 1 MG tablet; Take 1 tablet (1 mg total) by mouth nightly.       Try stopping xanax - as she doesn't seem to need it.    Wt loss

## 2018-07-03 PROBLEM — I70.0 AORTIC ATHEROSCLEROSIS: Status: ACTIVE | Noted: 2018-07-03

## 2018-07-26 ENCOUNTER — TELEPHONE (OUTPATIENT)
Dept: INTERNAL MEDICINE | Facility: CLINIC | Age: 73
End: 2018-07-26

## 2018-07-26 NOTE — TELEPHONE ENCOUNTER
----- Message from Sana Bruner sent at 7/26/2018  9:14 AM CDT -----  Contact: self/[phone]      ----- Message -----  From: Cathryn Kasper  Sent: 7/26/2018   8:53 AM  To: Radha REESE Staff    Patient called in regards needing to talk with her PCP. Patient stated that she is constantly in the bathroom (pee). Was seen by another doctor and was told that is not UTI, but was advised to see her Obgyn. Patient would like to know if she can come and see her PCP, or to get an advise from PCP. Please call and advise. Thank you

## 2018-07-26 NOTE — TELEPHONE ENCOUNTER
Spoke with pt states she has been urinating a lot more than usual and saw another provider who advised her that she did not have a UTI and she should f/u with gyn, pt scheduled an appointment with gyn but would like PCP advise whether if she should see PCP first or just stick with the one appointment and see gyn?      Please advise

## 2018-07-27 NOTE — TELEPHONE ENCOUNTER
pls call - I think it is fine just to see GYn    Call me if she has any concerns after that visit

## 2018-10-02 RX ORDER — ALPRAZOLAM 1 MG/1
TABLET ORAL
Qty: 90 TABLET | Refills: 0 | Status: SHIPPED | OUTPATIENT
Start: 2018-10-02 | End: 2019-03-25 | Stop reason: SDUPTHER

## 2018-11-14 ENCOUNTER — OFFICE VISIT (OUTPATIENT)
Dept: INTERNAL MEDICINE | Facility: CLINIC | Age: 73
End: 2018-11-14
Attending: INTERNAL MEDICINE
Payer: MEDICARE

## 2018-11-14 VITALS
DIASTOLIC BLOOD PRESSURE: 80 MMHG | SYSTOLIC BLOOD PRESSURE: 142 MMHG | HEART RATE: 62 BPM | HEIGHT: 64 IN | WEIGHT: 208.13 LBS | OXYGEN SATURATION: 96 % | BODY MASS INDEX: 35.53 KG/M2

## 2018-11-14 DIAGNOSIS — M54.16 LUMBAR RADICULOPATHY: Primary | ICD-10-CM

## 2018-11-14 DIAGNOSIS — I10 HYPERTENSION, UNSPECIFIED TYPE: ICD-10-CM

## 2018-11-14 DIAGNOSIS — I83.90 VARICOSE VEINS OF LOWER EXTREMITY, UNSPECIFIED LATERALITY, UNSPECIFIED WHETHER COMPLICATED: ICD-10-CM

## 2018-11-14 PROCEDURE — 99213 OFFICE O/P EST LOW 20 MIN: CPT | Mod: PBBFAC | Performed by: INTERNAL MEDICINE

## 2018-11-14 PROCEDURE — 99214 OFFICE O/P EST MOD 30 MIN: CPT | Mod: S$PBB,,, | Performed by: INTERNAL MEDICINE

## 2018-11-14 PROCEDURE — 99999 PR PBB SHADOW E&M-EST. PATIENT-LVL III: CPT | Mod: PBBFAC,,, | Performed by: INTERNAL MEDICINE

## 2018-11-14 RX ORDER — CARVEDILOL 25 MG/1
TABLET ORAL
Qty: 60 TABLET | Refills: 11 | Status: SHIPPED | OUTPATIENT
Start: 2018-11-14 | End: 2019-11-20 | Stop reason: SDUPTHER

## 2018-11-14 RX ORDER — MELOXICAM 15 MG/1
15 TABLET ORAL DAILY
Refills: 1 | COMMUNITY
Start: 2018-10-30 | End: 2018-12-18

## 2018-11-14 NOTE — PROGRESS NOTES
Subjective:       Patient ID: Caprice Gutierrez is a 73 y.o. female.    Chief Complaint: Leg Pain (left)    HPI   bp has been elevated recently up to 162/87.  142/80 now.     She is compliant with mes.    C/o pain L lat thigh beginning after heavy lifting in September.  She saw local doctor and was given an injection in buttocks.  She was prescribed voltaren gel, but didn't use it.  Then saw another doctor who suggested ibuprofen and compression stockings.    Then she saw an orthopedist, who prescribed meloxicam 15 mg daily.  Pain most noticeable when in bed.  Pain begins buttock, to lateral upper thigh radiating into foot.  No burning, tingling.  Intermittent.  Not worse when walking.        Also worried about veins of legs.    Review of Systems   Constitutional: Negative for fever and unexpected weight change.   HENT: Negative for congestion and postnasal drip.    Eyes: Negative for pain, discharge and visual disturbance.   Respiratory: Negative for cough, chest tightness, shortness of breath and wheezing.    Cardiovascular: Positive for leg swelling. Negative for chest pain.        Bilat varicose veins   Gastrointestinal: Negative for abdominal pain, constipation, diarrhea and nausea.   Genitourinary: Negative for difficulty urinating, dysuria and hematuria.   Skin: Negative for rash.   Neurological: Negative for weakness, numbness and headaches.   Psychiatric/Behavioral: Negative for dysphoric mood and sleep disturbance. The patient is not nervous/anxious.        Objective:    152/84  Physical Exam   Constitutional: She is oriented to person, place, and time. She appears well-developed and well-nourished. No distress.   Neurological: She is alert and oriented to person, place, and time.   Psychiatric: She has a normal mood and affect. Her behavior is normal.       Assessment:       1. Lumbar radiculopathy    2. Varicose veins of lower extremity, unspecified laterality, unspecified whether complicated    3.  Hypertension, unspecified type        Plan:       Caprice was seen today for leg pain.    Diagnoses and all orders for this visit:    Lumbar radiculopathy    Varicose veins of lower extremity, unspecified laterality, unspecified whether complicated  -     COMPRESSION STOCKINGS    Hypertension, unspecified type    Other orders  -     INCR carvedilol (COREG) 25 MG tablet; 1 tab po bid       Labs now.    Wt loss

## 2018-12-04 ENCOUNTER — PATIENT OUTREACH (OUTPATIENT)
Dept: ADMINISTRATIVE | Facility: HOSPITAL | Age: 73
End: 2018-12-04

## 2018-12-04 NOTE — PROGRESS NOTES
Pre-visit audit complete, pt due for an eye exam.  Attempted to contact pt, no answer and voicemail not set up.

## 2018-12-18 ENCOUNTER — OFFICE VISIT (OUTPATIENT)
Dept: INTERNAL MEDICINE | Facility: CLINIC | Age: 73
End: 2018-12-18
Payer: MEDICARE

## 2018-12-18 ENCOUNTER — TELEPHONE (OUTPATIENT)
Dept: INTERNAL MEDICINE | Facility: CLINIC | Age: 73
End: 2018-12-18

## 2018-12-18 VITALS
HEIGHT: 64 IN | BODY MASS INDEX: 35.34 KG/M2 | OXYGEN SATURATION: 96 % | DIASTOLIC BLOOD PRESSURE: 82 MMHG | HEART RATE: 56 BPM | SYSTOLIC BLOOD PRESSURE: 134 MMHG | WEIGHT: 207 LBS

## 2018-12-18 DIAGNOSIS — E78.5 HYPERLIPIDEMIA, UNSPECIFIED HYPERLIPIDEMIA TYPE: ICD-10-CM

## 2018-12-18 DIAGNOSIS — I10 HYPERTENSION, UNSPECIFIED TYPE: ICD-10-CM

## 2018-12-18 DIAGNOSIS — Z00.00 ANNUAL PHYSICAL EXAM: Primary | ICD-10-CM

## 2018-12-18 DIAGNOSIS — R73.03 PREDIABETES: ICD-10-CM

## 2018-12-18 PROCEDURE — 99999 PR PBB SHADOW E&M-EST. PATIENT-LVL III: CPT | Mod: PBBFAC,,, | Performed by: INTERNAL MEDICINE

## 2018-12-18 PROCEDURE — 99213 OFFICE O/P EST LOW 20 MIN: CPT | Mod: PBBFAC | Performed by: INTERNAL MEDICINE

## 2018-12-18 PROCEDURE — 99214 OFFICE O/P EST MOD 30 MIN: CPT | Mod: S$PBB,,, | Performed by: INTERNAL MEDICINE

## 2018-12-18 NOTE — PROGRESS NOTES
Subjective:       Patient ID: Caprice Gutierrez is a 73 y.o. female.    Chief Complaint: Follow-up (BP)    HPI  Review of Systems   Constitutional: Negative for fever and unexpected weight change.   HENT: Negative for congestion and postnasal drip.    Eyes: Negative for pain, discharge and visual disturbance.   Respiratory: Negative for cough, chest tightness, shortness of breath and wheezing.    Cardiovascular: Negative for chest pain and leg swelling.   Gastrointestinal: Negative for abdominal pain, constipation, diarrhea and nausea.   Genitourinary: Negative for difficulty urinating, dysuria and hematuria.   Skin: Negative for rash.   Neurological: Negative for headaches.   Psychiatric/Behavioral: Negative for dysphoric mood and sleep disturbance. The patient is not nervous/anxious.        Objective:      Physical Exam   Constitutional: She is oriented to person, place, and time. She appears well-developed and well-nourished. No distress.   HENT:   Head: Normocephalic and atraumatic.   Right Ear: External ear normal.   Left Ear: External ear normal.   Nose: Nose normal.   Mouth/Throat: Oropharynx is clear and moist.   Eyes: Conjunctivae and EOM are normal. Pupils are equal, round, and reactive to light. Right eye exhibits no discharge. Left eye exhibits no discharge. No scleral icterus.   Neck: Normal range of motion. Neck supple. No JVD present. No thyromegaly present.   Cardiovascular: Normal rate, regular rhythm and normal heart sounds. Exam reveals no gallop.   No murmur heard.  Pulmonary/Chest: Effort normal and breath sounds normal. No respiratory distress. She has no wheezes. She has no rales.   Abdominal: Soft. Bowel sounds are normal. She exhibits no distension and no mass. There is no tenderness. There is no rebound and no guarding.   Genitourinary: No breast tenderness, discharge or bleeding.   Musculoskeletal: Normal range of motion. She exhibits no edema or tenderness.   Lymphadenopathy:     She  has no cervical adenopathy.   Neurological: She is alert and oriented to person, place, and time. No cranial nerve deficit. Coordination normal.   Skin: Skin is warm and dry. No rash noted.   Psychiatric: She has a normal mood and affect. Her behavior is normal. Judgment and thought content normal.     Protective Sensation (w/ 10 gram monofilament):  Right: Intact  Left: Intact    Visual Inspection:  Onychomycosis -  Bilateral    Pedal Pulses:   Right: Present  Left: Present    Posterior tibialis:   Right Diminished  Left: Diminshed    134/82.   Results for orders placed or performed in visit on 11/14/18   CBC auto differential   Result Value Ref Range    WBC 5.82 3.90 - 12.70 K/uL    RBC 4.43 4.00 - 5.40 M/uL    Hemoglobin 12.3 12.0 - 16.0 g/dL    Hematocrit 38.1 37.0 - 48.5 %    MCV 86 82 - 98 fL    MCH 27.8 27.0 - 31.0 pg    MCHC 32.3 32.0 - 36.0 g/dL    RDW 16.4 (H) 11.5 - 14.5 %    Platelets 359 (H) 150 - 350 K/uL    MPV 10.0 9.2 - 12.9 fL    Gran # (ANC) 2.8 1.8 - 7.7 K/uL    Lymph # 1.7 1.0 - 4.8 K/uL    Mono # 0.8 0.3 - 1.0 K/uL    Eos # 0.5 0.0 - 0.5 K/uL    Baso # 0.03 0.00 - 0.20 K/uL    Gran% 47.6 38.0 - 73.0 %    Lymph% 29.6 18.0 - 48.0 %    Mono% 13.6 4.0 - 15.0 %    Eosinophil% 8.4 (H) 0.0 - 8.0 %    Basophil% 0.5 0.0 - 1.9 %    Differential Method Automated    Comprehensive metabolic panel   Result Value Ref Range    Sodium 139 136 - 145 mmol/L    Potassium 4.3 3.5 - 5.1 mmol/L    Chloride 100 95 - 110 mmol/L    CO2 30 (H) 23 - 29 mmol/L    Glucose 101 70 - 110 mg/dL    BUN, Bld 15 8 - 23 mg/dL    Creatinine 1.0 0.5 - 1.4 mg/dL    Calcium 10.0 8.7 - 10.5 mg/dL    Total Protein 8.2 6.0 - 8.4 g/dL    Albumin 3.8 3.5 - 5.2 g/dL    Total Bilirubin 0.5 0.1 - 1.0 mg/dL    Alkaline Phosphatase 80 55 - 135 U/L    AST 23 10 - 40 U/L    ALT 14 10 - 44 U/L    Anion Gap 9 8 - 16 mmol/L    eGFR if African American >60 >60 mL/min/1.73 m^2    eGFR if non African American 56 (A) >60 mL/min/1.73 m^2   Hemoglobin A1c    Result Value Ref Range    Hemoglobin A1C 5.7 (H) 4.0 - 5.6 %    Estimated Avg Glucose 117 68 - 131 mg/dL   Vitamin D   Result Value Ref Range    Vit D, 25-Hydroxy 43 30 - 96 ng/mL   Lipid panel   Result Value Ref Range    Cholesterol 237 (H) 120 - 199 mg/dL    Triglycerides 100 30 - 150 mg/dL    HDL 71 40 - 75 mg/dL    LDL Cholesterol 146.0 63.0 - 159.0 mg/dL    HDL/Chol Ratio 30.0 20.0 - 50.0 %    Total Cholesterol/HDL Ratio 3.3 2.0 - 5.0    Non-HDL Cholesterol 166 mg/dL     Assessment:       1. Annual physical exam    2. Prediabetes    3. Hypertension, unspecified type    4. Hyperlipidemia, unspecified hyperlipidemia type        Plan:       aCprice was seen today for follow-up.    Diagnoses and all orders for this visit:    Annual physical exam  -     Ambulatory consult to Optometry    Prediabetes    Hypertension, unspecified type    Hyperlipidemia, unspecified hyperlipidemia type        She declines statin.  Prefers to work on diet.

## 2019-01-10 ENCOUNTER — TELEPHONE (OUTPATIENT)
Dept: OPHTHALMOLOGY | Facility: CLINIC | Age: 74
End: 2019-01-10

## 2019-03-25 RX ORDER — ALPRAZOLAM 1 MG/1
TABLET ORAL
Qty: 90 TABLET | Refills: 1 | Status: SHIPPED | OUTPATIENT
Start: 2019-03-25 | End: 2019-09-10 | Stop reason: SDUPTHER

## 2019-04-06 ENCOUNTER — OUTSIDE PLACE OF SERVICE (OUTPATIENT)
Dept: CARDIOLOGY | Facility: CLINIC | Age: 74
End: 2019-04-06
Payer: MEDICARE

## 2019-04-06 PROCEDURE — 93010 ELECTROCARDIOGRAM REPORT: CPT | Mod: ,,, | Performed by: INTERNAL MEDICINE

## 2019-04-06 PROCEDURE — 93010 PR ELECTROCARDIOGRAM REPORT: ICD-10-PCS | Mod: ,,, | Performed by: INTERNAL MEDICINE

## 2019-04-08 ENCOUNTER — TELEPHONE (OUTPATIENT)
Dept: INTERNAL MEDICINE | Facility: CLINIC | Age: 74
End: 2019-04-08

## 2019-04-08 NOTE — TELEPHONE ENCOUNTER
----- Message from Magda Haile sent at 4/8/2019  8:11 AM CDT -----  Contact: self [phone] cell [phone]  Patient was seen in the ED for acid reflux over the weekend and was given an apt today but due to the weather she's cancelling today's apt and would like to be seen sometime this week. No available apts until 4/15. Please call patient back and advise.    thanks

## 2019-04-15 ENCOUNTER — OFFICE VISIT (OUTPATIENT)
Dept: INTERNAL MEDICINE | Facility: CLINIC | Age: 74
End: 2019-04-15
Payer: MEDICARE

## 2019-04-15 VITALS
WEIGHT: 203.94 LBS | HEIGHT: 64 IN | HEART RATE: 57 BPM | SYSTOLIC BLOOD PRESSURE: 152 MMHG | BODY MASS INDEX: 34.82 KG/M2 | OXYGEN SATURATION: 98 % | DIASTOLIC BLOOD PRESSURE: 96 MMHG

## 2019-04-15 DIAGNOSIS — I10 HYPERTENSION, UNSPECIFIED TYPE: Primary | ICD-10-CM

## 2019-04-15 DIAGNOSIS — Z86.39 HISTORY OF DIABETES MELLITUS, TYPE II: ICD-10-CM

## 2019-04-15 DIAGNOSIS — F43.9 SITUATIONAL STRESS: ICD-10-CM

## 2019-04-15 DIAGNOSIS — F43.21 ADJUSTMENT DISORDER WITH DEPRESSED MOOD: ICD-10-CM

## 2019-04-15 PROCEDURE — 99214 PR OFFICE/OUTPT VISIT, EST, LEVL IV, 30-39 MIN: ICD-10-PCS | Mod: S$PBB,,, | Performed by: INTERNAL MEDICINE

## 2019-04-15 PROCEDURE — 99999 PR PBB SHADOW E&M-EST. PATIENT-LVL III: CPT | Mod: PBBFAC,,, | Performed by: INTERNAL MEDICINE

## 2019-04-15 PROCEDURE — 99999 PR PBB SHADOW E&M-EST. PATIENT-LVL III: ICD-10-PCS | Mod: PBBFAC,,, | Performed by: INTERNAL MEDICINE

## 2019-04-15 PROCEDURE — 99214 OFFICE O/P EST MOD 30 MIN: CPT | Mod: S$PBB,,, | Performed by: INTERNAL MEDICINE

## 2019-04-15 PROCEDURE — 99213 OFFICE O/P EST LOW 20 MIN: CPT | Mod: PBBFAC | Performed by: INTERNAL MEDICINE

## 2019-04-15 RX ORDER — OMEPRAZOLE 20 MG/1
20 CAPSULE, DELAYED RELEASE ORAL DAILY
Qty: 90 CAPSULE | Refills: 3 | Status: SHIPPED | OUTPATIENT
Start: 2019-04-15 | End: 2020-09-08

## 2019-04-15 RX ORDER — LOSARTAN POTASSIUM AND HYDROCHLOROTHIAZIDE 25; 100 MG/1; MG/1
TABLET ORAL
Qty: 90 TABLET | Refills: 3 | Status: SHIPPED | OUTPATIENT
Start: 2019-04-15 | End: 2019-07-15
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
## 2019-05-07 RX ORDER — ERGOCALCIFEROL 1.25 MG/1
CAPSULE ORAL
Qty: 4 CAPSULE | Refills: 11 | Status: SHIPPED | OUTPATIENT
Start: 2019-05-07 | End: 2020-05-29

## 2019-06-14 ENCOUNTER — TELEPHONE (OUTPATIENT)
Dept: INTERNAL MEDICINE | Facility: CLINIC | Age: 74
End: 2019-06-14

## 2019-06-14 DIAGNOSIS — Z12.31 ENCOUNTER FOR SCREENING MAMMOGRAM FOR MALIGNANT NEOPLASM OF BREAST: Primary | ICD-10-CM

## 2019-06-14 NOTE — TELEPHONE ENCOUNTER
----- Message from Bruna Li sent at 6/14/2019 10:32 AM CDT -----  Contact: self/[phone]  Pt called in regards to getting her mammogram orders put into the system. She would like to get it done on 7-15-19 after she sees the dr.       Please advise

## 2019-07-01 ENCOUNTER — PATIENT OUTREACH (OUTPATIENT)
Dept: ADMINISTRATIVE | Facility: HOSPITAL | Age: 74
End: 2019-07-01

## 2019-07-01 NOTE — PROGRESS NOTES
Health Maintenance Due   Topic Date Due    TETANUS VACCINE  09/18/1963    High Dose Statin  09/18/1966    Eye Exam  08/28/2018    Hemoglobin A1c  05/14/2019    Mammogram  06/25/2019    DEXA SCAN  07/28/2019     Shingles vaccine due.    Pre-visit chart check complete.    Pt due for labs ordered by PCP.  Attempted to contact pt to schedule lab visit, no answer and voice mail not set up.

## 2019-07-15 ENCOUNTER — OFFICE VISIT (OUTPATIENT)
Dept: INTERNAL MEDICINE | Facility: CLINIC | Age: 74
End: 2019-07-15
Payer: MEDICARE

## 2019-07-15 ENCOUNTER — HOSPITAL ENCOUNTER (OUTPATIENT)
Dept: RADIOLOGY | Facility: HOSPITAL | Age: 74
Discharge: HOME | End: 2019-07-15
Attending: INTERNAL MEDICINE
Payer: MEDICARE

## 2019-07-15 VITALS
HEART RATE: 61 BPM | WEIGHT: 206.88 LBS | OXYGEN SATURATION: 99 % | HEIGHT: 65 IN | DIASTOLIC BLOOD PRESSURE: 86 MMHG | SYSTOLIC BLOOD PRESSURE: 140 MMHG | BODY MASS INDEX: 34.47 KG/M2

## 2019-07-15 DIAGNOSIS — E78.5 HYPERLIPIDEMIA, UNSPECIFIED HYPERLIPIDEMIA TYPE: ICD-10-CM

## 2019-07-15 DIAGNOSIS — I10 HYPERTENSION, UNSPECIFIED TYPE: Primary | ICD-10-CM

## 2019-07-15 DIAGNOSIS — I70.0 AORTIC ATHEROSCLEROSIS: ICD-10-CM

## 2019-07-15 DIAGNOSIS — R73.03 PREDIABETES: ICD-10-CM

## 2019-07-15 DIAGNOSIS — Z12.31 ENCOUNTER FOR SCREENING MAMMOGRAM FOR MALIGNANT NEOPLASM OF BREAST: ICD-10-CM

## 2019-07-15 PROCEDURE — 77067 SCR MAMMO BI INCL CAD: CPT | Mod: 26,,, | Performed by: RADIOLOGY

## 2019-07-15 PROCEDURE — 77063 MAMMO DIGITAL SCREENING BILAT WITH TOMOSYNTHESIS_CAD: ICD-10-PCS | Mod: 26,,, | Performed by: RADIOLOGY

## 2019-07-15 PROCEDURE — 99213 PR OFFICE/OUTPT VISIT, EST, LEVL III, 20-29 MIN: ICD-10-PCS | Mod: S$PBB,,, | Performed by: INTERNAL MEDICINE

## 2019-07-15 PROCEDURE — 77063 BREAST TOMOSYNTHESIS BI: CPT | Mod: 26,,, | Performed by: RADIOLOGY

## 2019-07-15 PROCEDURE — 99213 OFFICE O/P EST LOW 20 MIN: CPT | Mod: S$PBB,,, | Performed by: INTERNAL MEDICINE

## 2019-07-15 PROCEDURE — 77067 MAMMO DIGITAL SCREENING BILAT WITH TOMOSYNTHESIS_CAD: ICD-10-PCS | Mod: 26,,, | Performed by: RADIOLOGY

## 2019-07-15 PROCEDURE — 99999 PR PBB SHADOW E&M-EST. PATIENT-LVL III: ICD-10-PCS | Mod: PBBFAC,,, | Performed by: INTERNAL MEDICINE

## 2019-07-15 PROCEDURE — 99999 PR PBB SHADOW E&M-EST. PATIENT-LVL III: CPT | Mod: PBBFAC,,, | Performed by: INTERNAL MEDICINE

## 2019-07-15 PROCEDURE — 99213 OFFICE O/P EST LOW 20 MIN: CPT | Mod: PBBFAC | Performed by: INTERNAL MEDICINE

## 2019-07-15 PROCEDURE — 77067 SCR MAMMO BI INCL CAD: CPT | Mod: TC

## 2019-07-15 RX ORDER — VALSARTAN AND HYDROCHLOROTHIAZIDE 320; 25 MG/1; MG/1
1 TABLET, FILM COATED ORAL DAILY
Qty: 90 TABLET | Refills: 3 | Status: SHIPPED | OUTPATIENT
Start: 2019-07-15 | End: 2019-08-05

## 2019-07-15 RX ORDER — PRAVASTATIN SODIUM 40 MG/1
40 TABLET ORAL DAILY
Qty: 90 TABLET | Refills: 3 | Status: SHIPPED | OUTPATIENT
Start: 2019-07-15 | End: 2021-12-16

## 2019-07-15 NOTE — PATIENT INSTRUCTIONS
Change Losartan to Valsartan/hydrochlorthiazide 320/25 mg daily.    Start Pravastatin for high cholesterol.    Make appointment with your eye doctor!

## 2019-07-15 NOTE — PROGRESS NOTES
Subjective:       Patient ID: Caprice Gutierrez is a 73 y.o. female.    Chief Complaint: Follow-up (BP ck)    HPI   Eating well.  Reduced fats.    Anxious about daughter's health.    Now taking coreg as directed.    Depression is better.  Walking 5 days a week.    She has aortic atherosclerosis and hyperlipidemia.  She is amenable to a statin now.  Review of Systems   Constitutional: Negative for fever and unexpected weight change.   HENT: Negative for congestion and postnasal drip.    Eyes: Negative for pain, discharge and visual disturbance.   Respiratory: Negative for cough, chest tightness, shortness of breath and wheezing.    Cardiovascular: Negative for chest pain and leg swelling.   Gastrointestinal: Negative for abdominal pain, constipation, diarrhea and nausea.   Genitourinary: Negative for difficulty urinating, dysuria and hematuria.   Skin: Negative for rash.   Neurological: Negative for headaches.   Psychiatric/Behavioral: Negative for dysphoric mood and sleep disturbance. The patient is not nervous/anxious.        Objective:      Physical Exam   Constitutional: She appears well-developed and well-nourished. No distress.   Psychiatric: She has a normal mood and affect. Her behavior is normal.       144/80  Assessment:       1. Hypertension, unspecified type    2. Aortic atherosclerosis    3. Hyperlipidemia, unspecified hyperlipidemia type    4. Prediabetes        Plan:       Caprice was seen today for follow-up.    Diagnoses and all orders for this visit:    Hypertension, unspecified type    Aortic atherosclerosis    Hyperlipidemia, unspecified hyperlipidemia type  -     Lipid panel; Future  -     AST (SGOT); Future  -     ALT (SGPT); Future    Prediabetes  -     Hemoglobin A1c; Future    Other orders  -     valsartan-hydrochlorothiazide (DIOVAN-HCT) 320-25 mg per tablet; Take 1 tablet by mouth once daily.  -     pravastatin (PRAVACHOL) 40 MG tablet; Take 1 tablet (40 mg total) by mouth once  daily.       Eye exam closer to home.  She declines visit with PA  For f/u

## 2019-07-23 ENCOUNTER — TELEPHONE (OUTPATIENT)
Dept: INTERNAL MEDICINE | Facility: CLINIC | Age: 74
End: 2019-07-23

## 2019-07-23 NOTE — TELEPHONE ENCOUNTER
Spoke with pt, advised of PCP recommendations below; to take the whole pill, elevate legs and avoid salt. Pt verbalized understanding   Also advised to continue to monitor BP

## 2019-07-23 NOTE — TELEPHONE ENCOUNTER
No, she should NOT go back to losartan.    Take whole tab of valsartan/hct daily.  If legs are swelling, it is NOT because of valsartan.    Elevate legs and avoid salt.    Valsartan is a better drug than losartan, so I'd like her to stay on it.  Losartan was not controlling her BP

## 2019-07-23 NOTE — TELEPHONE ENCOUNTER
----- Message from Jennifer Blackwood sent at 7/23/2019  2:12 PM CDT -----  Contact: [phone]  Patient is requesting call from the office regarding valsartan.  Patient stated her BP has been elevated while taking this medication.  Patient states her feet swell and she has headaches.    Patient went to the doctor yesterday.  Patient would like to discuss with the Dr. Garcia    Please advise, thank you,

## 2019-07-23 NOTE — TELEPHONE ENCOUNTER
Pt says since starting new medication Valsartan- hydrochlorothiazide her BP has been elevated and she says her ankles and feet have been swelling. Pt does admit that she has been only taking half of the tablet because she says the pill is so big.    She says yesterday when she woke up her fingers were swollen so she went to her family doctor. BP was 142/90. She went home and took the whole tablet of valsartan-hctz. Her pressure came down to 136/80    Later that day bp came up to   152/90  163/90  158/87  7:00pm 122/77  Today her BP at lunch time was 147/82.   Pt says when she was on losartan she didn't have any swelling. She would like to know Should she continue valsartan-hctz and take it correctly or should she take half in the morning and half at night? She also want to know if she can go back to losartan. Please advise

## 2019-08-05 ENCOUNTER — TELEPHONE (OUTPATIENT)
Dept: INTERNAL MEDICINE | Facility: CLINIC | Age: 74
End: 2019-08-05

## 2019-08-05 RX ORDER — OLMESARTAN MEDOXOMIL AND HYDROCHLOROTHIAZIDE 40/25 40; 25 MG/1; MG/1
1 TABLET ORAL DAILY
Qty: 90 TABLET | Refills: 3 | Status: SHIPPED | OUTPATIENT
Start: 2019-08-05 | End: 2019-09-10

## 2019-08-05 NOTE — TELEPHONE ENCOUNTER
----- Message from Cathryn Kasper sent at 8/5/2019  7:51 AM CDT -----  Contact: self/[phone] or [phone]  Patient valsartan is working as far as keeping her blood pressure down, but the side effects of  dizziness, crams in the calf are not,  If PCP can call something else with [facility].   . Please call the pharmacy. Thank you!!!

## 2019-08-05 NOTE — TELEPHONE ENCOUNTER
Ok- change valsartan hct to olmesartan/hct - rx sent ot pharmacy    Call if she has problems with it

## 2019-08-21 ENCOUNTER — NURSE TRIAGE (OUTPATIENT)
Dept: ADMINISTRATIVE | Facility: CLINIC | Age: 74
End: 2019-08-21

## 2019-08-21 ENCOUNTER — TELEPHONE (OUTPATIENT)
Dept: INTERNAL MEDICINE | Facility: CLINIC | Age: 74
End: 2019-08-21

## 2019-08-21 NOTE — TELEPHONE ENCOUNTER
Spoke with pt. Advised of message below. She says her normal blood sugars range from 112-120, lately it has been . She says she thinks this olmesartan is causing sugars to drop and that may be why she has been feeling weak. I advised that Olmesartan is for BP and her BS reading are in normal range.    BP reading after taking meds in the am  120/74  129/80  109/66  118/59  126/79  114/62    She says she will continue meds and record BP until seeing PCP on the 9th. Advised to call us if anything changes. ISAMAR

## 2019-08-21 NOTE — TELEPHONE ENCOUNTER
Coreg has to be dosed twice daily, not once daily.  It only works for 12 hours.      Looks like she was changed from losartan --> valsartan hct --> olmesartan hct 7/2019.    Has she been monitoring home BP?

## 2019-08-21 NOTE — TELEPHONE ENCOUNTER
I spoke with pt this am, see previous encounter. Now pt is saying she is going back to losartan-hydrochlorothiazide 100-25 mg (HYZAAR) until she sees PCP on 9/9/19.   She says she didn't have any issues on losartan and wants to stop taking olmesartan so she is informing us that she will be starting back on losartan.

## 2019-08-21 NOTE — TELEPHONE ENCOUNTER
----- Message from Sana Bruner sent at 8/21/2019 12:48 PM CDT -----  Contact: Patient [phone] or [phone]  Stated until she sees you on 09/09/2019 she is going back on Rx losartan-hydrochlorothiazide 100-25 mg (HYZAAR)     Please call and advise.    Thank You

## 2019-08-21 NOTE — TELEPHONE ENCOUNTER
"    Reason for Disposition   Caller has urgent medication question about med that PCP prescribed and triager unable to answer question    Additional Information   Negative: Caller has NON-URGENT medication question about med that PCP prescribed and triager unable to answer question   Negative: [1] DOUBLE DOSE (an extra dose or lesser amount) of prescription drug AND [2] NO symptoms (Exception: a double dose of antibiotics)   Negative: Diabetes drug error or overdose (e.g., insulin or extra dose)   Negative: [1] Request for URGENT new prescription or refill of "essential" medication (i.e., likelihood of harm to patient if not taken) AND [2] triager unable to fill per unit policy   Negative: [1] Prescription not at pharmacy AND [2] was prescribed today by PCP   Negative: Pharmacy calling with prescription questions and triager unable to answer question    Protocols used: MEDICATION QUESTION CALL-A-    Pt is calling to find out if she have to take the coreg twice a day. She said taking it twice a day along with the olmesartan is making her "weak". Blood pressure readings today 126/79 yesterday 104/62. Please call patient and advise.   "

## 2019-08-23 ENCOUNTER — HOSPITAL ENCOUNTER (EMERGENCY)
Facility: HOSPITAL | Age: 74
Discharge: HOME | End: 2019-08-23
Attending: SURGERY
Payer: MEDICARE

## 2019-08-23 VITALS
OXYGEN SATURATION: 98 % | TEMPERATURE: 97 F | RESPIRATION RATE: 18 BRPM | SYSTOLIC BLOOD PRESSURE: 133 MMHG | DIASTOLIC BLOOD PRESSURE: 76 MMHG | BODY MASS INDEX: 34.11 KG/M2 | HEART RATE: 66 BPM | WEIGHT: 205 LBS

## 2019-08-23 DIAGNOSIS — R53.1 WEAKNESS: ICD-10-CM

## 2019-08-23 DIAGNOSIS — T46.5X5A ADVERSE EFFECT OF ANTIHYPERTENSIVE, INITIAL ENCOUNTER: Primary | ICD-10-CM

## 2019-08-23 LAB
ALBUMIN SERPL BCP-MCNC: 4 G/DL (ref 3.5–5.2)
ALP SERPL-CCNC: 82 U/L (ref 38–126)
ALT SERPL W/O P-5'-P-CCNC: 15 U/L (ref 10–44)
ANION GAP SERPL CALC-SCNC: 6 MMOL/L (ref 8–16)
AST SERPL-CCNC: 23 U/L (ref 15–46)
BASOPHILS # BLD AUTO: 0.07 K/UL (ref 0–0.2)
BASOPHILS NFR BLD: 0.9 % (ref 0–1.9)
BILIRUB SERPL-MCNC: 0.4 MG/DL (ref 0.1–1)
BILIRUB UR QL STRIP: NEGATIVE
BUN SERPL-MCNC: 21 MG/DL (ref 7–17)
CALCIUM SERPL-MCNC: 9.6 MG/DL (ref 8.7–10.5)
CHLORIDE SERPL-SCNC: 101 MMOL/L (ref 95–110)
CLARITY UR REFRACT.AUTO: CLEAR
CO2 SERPL-SCNC: 31 MMOL/L (ref 23–29)
COLOR UR AUTO: YELLOW
CREAT SERPL-MCNC: 0.95 MG/DL (ref 0.5–1.4)
DIFFERENTIAL METHOD: ABNORMAL
EOSINOPHIL # BLD AUTO: 0.2 K/UL (ref 0–0.5)
EOSINOPHIL NFR BLD: 2.1 % (ref 0–8)
ERYTHROCYTE [DISTWIDTH] IN BLOOD BY AUTOMATED COUNT: 15.9 % (ref 11.5–14.5)
EST. GFR  (AFRICAN AMERICAN): >60 ML/MIN/1.73 M^2
EST. GFR  (NON AFRICAN AMERICAN): 59.6 ML/MIN/1.73 M^2
GLUCOSE SERPL-MCNC: 100 MG/DL (ref 70–110)
GLUCOSE UR QL STRIP: NEGATIVE
HCT VFR BLD AUTO: 36 % (ref 37–48.5)
HGB BLD-MCNC: 12.1 G/DL (ref 12–16)
HGB UR QL STRIP: NEGATIVE
KETONES UR QL STRIP: NEGATIVE
LEUKOCYTE ESTERASE UR QL STRIP: NEGATIVE
LYMPHOCYTES # BLD AUTO: 1.9 K/UL (ref 1–4.8)
LYMPHOCYTES NFR BLD: 25.3 % (ref 18–48)
MCH RBC QN AUTO: 28.3 PG (ref 27–31)
MCHC RBC AUTO-ENTMCNC: 33.6 G/DL (ref 32–36)
MCV RBC AUTO: 84 FL (ref 82–98)
MONOCYTES # BLD AUTO: 1 K/UL (ref 0.3–1)
MONOCYTES NFR BLD: 12.6 % (ref 4–15)
NEUTROPHILS # BLD AUTO: 4.4 K/UL (ref 1.8–7.7)
NEUTROPHILS NFR BLD: 59.1 % (ref 38–73)
NITRITE UR QL STRIP: NEGATIVE
NT-PROBNP: 28 PG/ML (ref 5–900)
PH UR STRIP: 7 [PH] (ref 5–8)
PLATELET # BLD AUTO: 314 K/UL (ref 150–350)
PMV BLD AUTO: 9.6 FL (ref 9.2–12.9)
POCT GLUCOSE: 100 MG/DL (ref 70–110)
POTASSIUM SERPL-SCNC: 3.7 MMOL/L (ref 3.5–5.1)
PROT SERPL-MCNC: 7.5 G/DL (ref 6–8.4)
PROT UR QL STRIP: NEGATIVE
RBC # BLD AUTO: 4.27 M/UL (ref 4–5.4)
SODIUM SERPL-SCNC: 138 MMOL/L (ref 136–145)
SP GR UR STRIP: 1 (ref 1–1.03)
TROPONIN I SERPL DL<=0.01 NG/ML-MCNC: <0.012 NG/ML (ref 0.01–0.03)
URN SPEC COLLECT METH UR: NORMAL
UROBILINOGEN UR STRIP-ACNC: NEGATIVE EU/DL
WBC # BLD AUTO: 7.52 K/UL (ref 3.9–12.7)

## 2019-08-23 PROCEDURE — 96361 HYDRATE IV INFUSION ADD-ON: CPT | Mod: ER

## 2019-08-23 PROCEDURE — 84484 ASSAY OF TROPONIN QUANT: CPT | Mod: ER

## 2019-08-23 PROCEDURE — 81003 URINALYSIS AUTO W/O SCOPE: CPT | Mod: ER

## 2019-08-23 PROCEDURE — 63600175 PHARM REV CODE 636 W HCPCS: Mod: ER | Performed by: SURGERY

## 2019-08-23 PROCEDURE — 80053 COMPREHEN METABOLIC PANEL: CPT | Mod: ER

## 2019-08-23 PROCEDURE — 96360 HYDRATION IV INFUSION INIT: CPT | Mod: ER

## 2019-08-23 PROCEDURE — 99284 EMERGENCY DEPT VISIT MOD MDM: CPT | Mod: 25,ER

## 2019-08-23 PROCEDURE — 85025 COMPLETE CBC W/AUTO DIFF WBC: CPT | Mod: ER

## 2019-08-23 PROCEDURE — 83880 ASSAY OF NATRIURETIC PEPTIDE: CPT | Mod: ER

## 2019-08-23 PROCEDURE — 82962 GLUCOSE BLOOD TEST: CPT | Mod: ER

## 2019-08-23 RX ADMIN — SODIUM CHLORIDE 1000 ML: 0.9 INJECTION, SOLUTION INTRAVENOUS at 08:08

## 2019-08-23 NOTE — ED PROVIDER NOTES
"Encounter Date: 8/23/2019       History     Chief Complaint   Patient presents with    Weakness     Pt states has been having weakness.  States has been having changes to blood pressure medicine per PCP.  States had dizziness with initial change but denies at this time.  States new medication is causing blood sugar to "go down."       Patient has been having weakness since she started taking a blood pressure medication with a diuretic in it.  For last several days she feels weak in the morning and her blood sugar gets low blood sugar this morning was 90    The history is provided by the patient.   General Illness    The current episode started several days ago. The problem occurs occasionally. The problem has been gradually improving. The pain is at a severity of 0/10. Nothing relieves the symptoms. The symptoms are aggravated by activity. Pertinent negatives include no fever, no decreased vision, no double vision, no eye itching, no congestion, no ear discharge, no headaches, no hearing loss, no cough and no discharge.     Review of patient's allergies indicates:   Allergen Reactions    Ace inhibitors Rash    Lisinopril Rash     Past Medical History:   Diagnosis Date    Abnormal Pap smear 1985, 2000    LEEP, [facility]    Allergy     Anemia     Degenerative arthritis of shoulder region     Diabetes mellitus type II     GERD (gastroesophageal reflux disease)     Hyperlipidemia     Hypertension     Keloid cicatrix     Obesity      Past Surgical History:   Procedure Laterality Date    APPENDECTOMY  1980    CERVICAL BIOPSY  W/ LOOP ELECTRODE EXCISION  1985    CHOLECYSTECTOMY  1980    [facility]  2000    COLONOSCOPY N/A 7/12/2016    Performed by Chito Day MD at [facility] (4TH FLR)    DILATION AND CURETTAGE OF UTERUS  1983    LIPOMA RESECTION      TUBAL LIGATION  1985    UMBILICAL HERNIA REPAIR      Wedge resection liver -hemangioma       Family History   Problem Relation Age of Onset    Diabetes Brother  "    Alcohol abuse Brother     Cirrhosis Brother     Colon cancer Sister 69    Hypertension Sister     Cancer Sister         colon    Diabetes Sister     Arthritis Sister     Cancer Mother         brain    Diabetes Brother     Cancer Brother         lung    Hypertension Maternal Aunt     Stroke Maternal Aunt     Melanoma Neg Hx      Social History     Tobacco Use    Smoking status: Never Smoker    Smokeless tobacco: Never Used   Substance Use Topics    Alcohol use: No    Drug use: No     Review of Systems   Constitutional: Positive for fatigue. Negative for fever.   HENT: Negative.  Negative for congestion, ear discharge and hearing loss.    Eyes: Negative.  Negative for double vision, discharge and itching.   Respiratory: Negative.  Negative for cough.    Cardiovascular: Negative.    Gastrointestinal: Negative.    Endocrine: Negative.    Genitourinary: Negative.    Musculoskeletal: Negative.    Skin: Negative.    Allergic/Immunologic: Negative.    Neurological: Positive for weakness. Negative for headaches.   Hematological: Negative.    Psychiatric/Behavioral: Negative.        Physical Exam     Initial Vitals [08/23/19 0831]   BP Pulse Resp Temp SpO2   139/78 74 18 98.8 °F (37.1 °C) 97 %      MAP       --         Physical Exam    Nursing note and vitals reviewed.  Constitutional: She appears well-developed and well-nourished.   HENT:   Head: Normocephalic.   Eyes: Conjunctivae are normal.   Neck: Normal range of motion.   Cardiovascular: Normal rate.   Pulmonary/Chest: Breath sounds normal.   Abdominal: Soft.   Musculoskeletal: Normal range of motion.   Neurological: She is alert and oriented to person, place, and time. GCS score is 15. GCS eye subscore is 4. GCS verbal subscore is 5. GCS motor subscore is 6.   Skin: Skin is warm.   Psychiatric: She has a normal mood and affect.         ED Course   Procedures  Labs Reviewed   CBC W/ AUTO DIFFERENTIAL - Abnormal; Notable for the following components:        Result Value    Hematocrit 36.0 (*)     RDW 15.9 (*)     All other components within normal limits   COMPREHENSIVE METABOLIC PANEL - Abnormal; Notable for the following components:    CO2 31 (*)     BUN, Bld 21 (*)     Anion Gap 6 (*)     eGFR if non  59.6 (*)     All other components within normal limits   TROPONIN I   URINALYSIS, REFLEX TO URINE CULTURE    Narrative:     Preferred Collection Type->Urine, Clean Catch   NT-PRO NATRIURETIC PEPTIDE   POCT GLUCOSE   POCT GLUCOSE MONITORING CONTINUOUS          Imaging Results    None          Medical Decision Making:   Initial Assessment:   Weakness with mild hypotension  ED Management:  Patient has adverse reaction to her new medication.  She feels weak lethargic neurologic exam is normal physical exam is normal laboratory exam is normal recommend follow-up with primary doctor                      Clinical Impression:       ICD-10-CM ICD-9-CM   1. Adverse effect of antihypertensive, initial encounter T46.5X5A E942.6   2. Weakness R53.1 780.79         Disposition:   Disposition: Discharged  Condition: Stable                        SUSAN Sarmiento III, MD  08/23/19 1200

## 2019-08-23 NOTE — ED NOTES
MD speaking with patient  
Pt awaiting re evaluation by MD  
Pt has no c/o anything at this time. Ambulates without difficulty or c/o anything.   
States changed back to taking initial blood pressure medications.  States called PCP to be seen today but PCP not in office.   
Alert-The patient is alert, awake and responds to voice. The patient is oriented to time, place, and person. The triage nurse is able to obtain subjective information.

## 2019-08-25 ENCOUNTER — NURSE TRIAGE (OUTPATIENT)
Dept: ADMINISTRATIVE | Facility: CLINIC | Age: 74
End: 2019-08-25

## 2019-08-25 NOTE — TELEPHONE ENCOUNTER
"Patient called to report the following:     -feeling very weak  -seen in ER for reaction to antihypertensive   -mouth is dry  -not as weak   -cbg is 105 mg/dl   -does not know bp at this time   -would like to know what medication to continue   -advised to f/u with provider     Reason for Disposition   Caller has urgent medication question about med that PCP prescribed and triager unable to answer question    Additional Information   Negative: MORE THAN A DOUBLE DOSE of a prescription or over-the-counter (OTC) drug   Negative: [1] DOUBLE DOSE (an extra dose or lesser amount) of over-the-counter (OTC) drug AND [2] any symptoms (e.g., dizziness, nausea, pain, sleepiness)   Negative: [1] DOUBLE DOSE (an extra dose or lesser amount) of prescription drug AND [2] any symptoms (e.g., dizziness, nausea, pain, sleepiness)   Negative: Took another person's prescription drug   Negative: [1] DOUBLE DOSE (an extra dose or lesser amount) of prescription drug AND [2] NO symptoms (Exception: a double dose of antibiotics)   Negative: Diabetes drug error or overdose (e.g., insulin or extra dose)   Negative: [1] Request for URGENT new prescription or refill of "essential" medication (i.e., likelihood of harm to patient if not taken) AND [2] triager unable to fill per unit policy   Negative: [1] Prescription not at pharmacy AND [2] was prescribed today by PCP   Negative: Pharmacy calling with prescription questions and triager unable to answer question    Protocols used: MEDICATION QUESTION CALL-A-AH      "

## 2019-08-26 ENCOUNTER — TELEPHONE (OUTPATIENT)
Dept: INTERNAL MEDICINE | Facility: CLINIC | Age: 74
End: 2019-08-26

## 2019-08-26 NOTE — TELEPHONE ENCOUNTER
pls call -     Has she been checking BP at home?  What was last reading?     Has she restarted losartan/Hctz, as per last message?    thanks

## 2019-08-26 NOTE — TELEPHONE ENCOUNTER
----- Message from Danica Arshad sent at 8/26/2019  9:00 AM CDT -----  Contact: self/[phone]  Pt was taking olmesartan-hydrochlorothiazide (BENICAR HCT) 40-25 mg per tablet and she was in the ER because the medication was taking too much fluid out of her body and left her very weak. It also lowered her sugar levels. Pt was requesting another medication. Pt would also like to get the doctor to take a look at her records from over the weekend. Please call and advise.        Thank You

## 2019-08-26 NOTE — TELEPHONE ENCOUNTER
pls call - continue carvedilol.  Hold losartan for now.  Keep sept 9 appt - record home readings and bring in home machine if possible

## 2019-08-26 NOTE — TELEPHONE ENCOUNTER
Spoke with pt, notified of message below. Pt verbalize understating says she will bring BP log and home cuff

## 2019-08-26 NOTE — TELEPHONE ENCOUNTER
Pt says she took BP today at her daughters house this mornig. She says she cant really remember but it was close to 114/50 something. Asked pt to check BP while on the phone. BP is 127/67 pulse 63    Doctor in the ER told pt to stop olmesartan, she has not restarted started Losartan . She says she hasn't taken anything for BP since Saturday. She is only taking carvedilol

## 2019-09-03 ENCOUNTER — LAB VISIT (OUTPATIENT)
Dept: LAB | Facility: HOSPITAL | Age: 74
End: 2019-09-03
Attending: INTERNAL MEDICINE
Payer: MEDICARE

## 2019-09-03 DIAGNOSIS — R73.03 PREDIABETES: ICD-10-CM

## 2019-09-03 DIAGNOSIS — E78.5 HYPERLIPIDEMIA, UNSPECIFIED HYPERLIPIDEMIA TYPE: ICD-10-CM

## 2019-09-03 LAB
ALT SERPL W/O P-5'-P-CCNC: 16 U/L (ref 10–44)
AST SERPL-CCNC: 28 U/L (ref 15–46)
CHOLEST SERPL-MCNC: 194 MG/DL (ref 120–199)
CHOLEST/HDLC SERPL: 2.9 {RATIO} (ref 2–5)
ESTIMATED AVG GLUCOSE: 123 MG/DL (ref 68–131)
HBA1C MFR BLD HPLC: 5.9 % (ref 4–5.6)
HDLC SERPL-MCNC: 67 MG/DL (ref 40–75)
HDLC SERPL: 34.5 % (ref 20–50)
LDLC SERPL CALC-MCNC: 114.8 MG/DL (ref 63–159)
NONHDLC SERPL-MCNC: 127 MG/DL
TRIGL SERPL-MCNC: 61 MG/DL (ref 30–150)

## 2019-09-03 PROCEDURE — 83036 HEMOGLOBIN GLYCOSYLATED A1C: CPT

## 2019-09-03 PROCEDURE — 80061 LIPID PANEL: CPT

## 2019-09-03 PROCEDURE — 36415 COLL VENOUS BLD VENIPUNCTURE: CPT | Mod: PO

## 2019-09-03 PROCEDURE — 84450 TRANSFERASE (AST) (SGOT): CPT | Mod: PO

## 2019-09-03 PROCEDURE — 84460 ALANINE AMINO (ALT) (SGPT): CPT | Mod: PO

## 2019-09-05 ENCOUNTER — TELEPHONE (OUTPATIENT)
Dept: INTERNAL MEDICINE | Facility: CLINIC | Age: 74
End: 2019-09-05

## 2019-09-05 NOTE — TELEPHONE ENCOUNTER
----- Message from Sana Bruner sent at 9/5/2019  8:49 AM CDT -----  Contact: Patient [phone]  Stated that she did labs on 09/03/2019 and wants to know if there is anything in the labs to explain her weakness.    Please call and advise.    Thank You

## 2019-09-05 NOTE — TELEPHONE ENCOUNTER
Ps call - liver tests normal  a1c normal  lipids normal  She has appt next week.  Labs don't explain weakness.

## 2019-09-09 ENCOUNTER — TELEPHONE (OUTPATIENT)
Dept: INTERNAL MEDICINE | Facility: CLINIC | Age: 74
End: 2019-09-09

## 2019-09-09 ENCOUNTER — OUTSIDE PLACE OF SERVICE (OUTPATIENT)
Dept: CARDIOLOGY | Facility: CLINIC | Age: 74
End: 2019-09-09
Payer: MEDICARE

## 2019-09-09 ENCOUNTER — NURSE TRIAGE (OUTPATIENT)
Dept: ADMINISTRATIVE | Facility: CLINIC | Age: 74
End: 2019-09-09

## 2019-09-09 PROCEDURE — 93010 ELECTROCARDIOGRAM REPORT: CPT | Mod: ,,, | Performed by: INTERNAL MEDICINE

## 2019-09-09 PROCEDURE — 93010 PR ELECTROCARDIOGRAM REPORT: ICD-10-PCS | Mod: ,,, | Performed by: INTERNAL MEDICINE

## 2019-09-09 NOTE — TELEPHONE ENCOUNTER
----- Message from Val Hinds sent at 9/9/2019  7:53 AM CDT -----  Contact: Call her at [phone]  Patient was booked out and need to be seen in the AM ASAP then what was re booked for her.

## 2019-09-09 NOTE — TELEPHONE ENCOUNTER
Spoke with patient and informed her that she does have an appointment with Doctor Garcia on 9/10/19 at 1:40 pm, but patient would like to know if she could come in earlier if you have any open time slots available due to traffic coming and going. Patient did confirmed date and time of appointment.     Please advise!

## 2019-09-09 NOTE — TELEPHONE ENCOUNTER
Went to ED last night for weakness, states it has been going on  She says doctor suspects her cbg might be dropping in her sleep  B/p last night in ED was 200/91,  137/83 b/p this morning, now 169/94    Reason for Disposition   Systolic BP >= 160 OR Diastolic >= 100    Additional Information   Negative: Sounds like a life-threatening emergency to the triager   Negative: Pregnant > 20 weeks and new hand or face swelling   Negative: Pregnant > 20 weeks and BP > 140/90   Negative: Systolic BP >= 160 OR Diastolic >= 100, and any cardiac or neurologic symptoms (e.g., chest pain, difficulty breathing, unsteady gait, blurred vision)   Negative: Patient sounds very sick or weak to the triager   Negative: BP Systolic BP >= 140 OR Diastolic >= 90 and postpartum < 4 weeks   Negative: Systolic BP >= 180 OR Diastolic >= 110, and missed most recent dose of blood pressure medication   Negative: Systolic BP >= 180 OR Diastolic >= 110   Negative: Patient wants to be seen   Negative: Ran out of BP medications   Negative: Taking BP medications and feels is having side effects (e.g., impotence, cough, dizziness)    Protocols used: HIGH BLOOD PRESSURE-A-OH

## 2019-09-10 ENCOUNTER — OFFICE VISIT (OUTPATIENT)
Dept: INTERNAL MEDICINE | Facility: CLINIC | Age: 74
End: 2019-09-10
Payer: MEDICARE

## 2019-09-10 VITALS
SYSTOLIC BLOOD PRESSURE: 138 MMHG | DIASTOLIC BLOOD PRESSURE: 84 MMHG | HEART RATE: 62 BPM | BODY MASS INDEX: 34.08 KG/M2 | OXYGEN SATURATION: 99 % | HEIGHT: 65 IN | WEIGHT: 204.56 LBS

## 2019-09-10 DIAGNOSIS — F41.9 ANXIETY: ICD-10-CM

## 2019-09-10 DIAGNOSIS — I10 HYPERTENSION, UNSPECIFIED TYPE: Primary | ICD-10-CM

## 2019-09-10 PROCEDURE — 99213 OFFICE O/P EST LOW 20 MIN: CPT | Mod: PBBFAC | Performed by: INTERNAL MEDICINE

## 2019-09-10 PROCEDURE — 99999 PR PBB SHADOW E&M-EST. PATIENT-LVL III: ICD-10-PCS | Mod: PBBFAC,,, | Performed by: INTERNAL MEDICINE

## 2019-09-10 PROCEDURE — 99214 OFFICE O/P EST MOD 30 MIN: CPT | Mod: S$PBB,,, | Performed by: INTERNAL MEDICINE

## 2019-09-10 PROCEDURE — 99999 PR PBB SHADOW E&M-EST. PATIENT-LVL III: CPT | Mod: PBBFAC,,, | Performed by: INTERNAL MEDICINE

## 2019-09-10 PROCEDURE — 99214 PR OFFICE/OUTPT VISIT, EST, LEVL IV, 30-39 MIN: ICD-10-PCS | Mod: S$PBB,,, | Performed by: INTERNAL MEDICINE

## 2019-09-10 RX ORDER — OLMESARTAN MEDOXOMIL 20 MG/1
20 TABLET ORAL DAILY
Qty: 90 TABLET | Refills: 3 | Status: SHIPPED | OUTPATIENT
Start: 2019-09-10 | End: 2019-10-29 | Stop reason: ALTCHOICE

## 2019-09-10 RX ORDER — ALPRAZOLAM 1 MG/1
TABLET ORAL
Qty: 30 TABLET | Refills: 2 | Status: SHIPPED | OUTPATIENT
Start: 2019-09-10 | End: 2020-03-05 | Stop reason: SDUPTHER

## 2019-09-10 NOTE — PROGRESS NOTES
Subjective:       Patient ID: Caprice Gutierrez is a 73 y.o. female.    Chief Complaint: Extremity Weakness    HPIc.o weakness every since trying valsartan/hct July 15.  We later changed valsartan to olmesartan/hct due to c/o leg edema.  She attributes weakness to dehydration.  She had 2 ED visits.  8/23 here, ad 2 days ago in [facility]..  She was told weakness was due to sugar falling.    More specifically , she c/o nausea, leg muscle weakness.  She eats to feel better.  Then awakens feeling weak.  Xanax at bedtime alleviates nighttime weakness.    She has been very anxious, worried about family issues.    Review of Systems   Constitutional: Negative for fever and unexpected weight change.   HENT: Negative for congestion and postnasal drip.    Eyes: Negative for pain, discharge and visual disturbance.   Respiratory: Negative for cough, chest tightness, shortness of breath and wheezing.    Cardiovascular: Negative for chest pain and leg swelling.   Gastrointestinal: Negative for abdominal pain, constipation, diarrhea and nausea.   Genitourinary: Negative for difficulty urinating, dysuria and hematuria.   Skin: Negative for rash.   Neurological: Negative for headaches.   Psychiatric/Behavioral: Negative for dysphoric mood and sleep disturbance. The patient is not nervous/anxious.        Objective:      Physical Exam   Constitutional: She is oriented to person, place, and time. She appears well-developed and well-nourished.   Eyes: No scleral icterus.   Neck: No JVD present. No thyromegaly present.   Cardiovascular: Normal rate, regular rhythm and normal heart sounds.   Pulmonary/Chest: Effort normal and breath sounds normal. No respiratory distress. She has no wheezes. She has no rales.   Abdominal: Soft. She exhibits no mass. There is no tenderness.   Musculoskeletal: She exhibits no edema.   Neurological: She is alert and oriented to person, place, and time.   Psychiatric: She has a normal mood and  "affect. Her behavior is normal.       186/100 ours  166/101 her machine  Repeat 166/94  Results for orders placed or performed in visit on 09/03/19   Lipid panel   Result Value Ref Range    Cholesterol 194 120 - 199 mg/dL    Triglycerides 61 30 - 150 mg/dL    HDL 67 40 - 75 mg/dL    LDL Cholesterol 114.8 63.0 - 159.0 mg/dL    Hdl/Cholesterol Ratio 34.5 20.0 - 50.0 %    Total Cholesterol/HDL Ratio 2.9 2.0 - 5.0    Non-HDL Cholesterol 127 mg/dL   AST (SGOT)   Result Value Ref Range    AST 28 15 - 46 U/L   ALT (SGPT)   Result Value Ref Range    ALT 16 10 - 44 U/L   Hemoglobin A1c   Result Value Ref Range    Hemoglobin A1C 5.9 (H) 4.0 - 5.6 %    Estimated Avg Glucose 123 68 - 131 mg/dL     Assessment:       1. Hypertension, unspecified type    2. Anxiety                   - likely cuase of "weakness'  Plan:       Caprice was seen today for extremity weakness.    Diagnoses and all orders for this visit:    Hypertension, unspecified type    Anxiety    Other orders  -     olmesartan (BENICAR) 20 MG tablet; Take 1 tablet (20 mg total) by mouth once daily.  -     ALPRAZolam (XANAX) 1 MG tablet; Half-1 tab po q day prn anxiety         xanax qhs as needed.    Reassurance. stsress reduction     bp check 1 mo  "

## 2019-10-10 ENCOUNTER — OFFICE VISIT (OUTPATIENT)
Dept: INTERNAL MEDICINE | Facility: CLINIC | Age: 74
End: 2019-10-10
Payer: MEDICARE

## 2019-10-10 VITALS
WEIGHT: 208.13 LBS | HEART RATE: 65 BPM | SYSTOLIC BLOOD PRESSURE: 178 MMHG | HEIGHT: 65 IN | BODY MASS INDEX: 34.68 KG/M2 | DIASTOLIC BLOOD PRESSURE: 92 MMHG

## 2019-10-10 DIAGNOSIS — R73.03 PREDIABETES: ICD-10-CM

## 2019-10-10 DIAGNOSIS — E78.5 HYPERLIPIDEMIA, UNSPECIFIED HYPERLIPIDEMIA TYPE: ICD-10-CM

## 2019-10-10 DIAGNOSIS — I10 HYPERTENSION, ESSENTIAL: Primary | ICD-10-CM

## 2019-10-10 DIAGNOSIS — F41.9 ANXIETY: ICD-10-CM

## 2019-10-10 DIAGNOSIS — E55.9 VITAMIN D DEFICIENCY: ICD-10-CM

## 2019-10-10 PROCEDURE — 99999 PR PBB SHADOW E&M-EST. PATIENT-LVL IV: ICD-10-PCS | Mod: PBBFAC,,, | Performed by: PHYSICIAN ASSISTANT

## 2019-10-10 PROCEDURE — 99214 OFFICE O/P EST MOD 30 MIN: CPT | Mod: PBBFAC | Performed by: PHYSICIAN ASSISTANT

## 2019-10-10 PROCEDURE — 99999 PR PBB SHADOW E&M-EST. PATIENT-LVL IV: CPT | Mod: PBBFAC,,, | Performed by: PHYSICIAN ASSISTANT

## 2019-10-10 PROCEDURE — 99214 PR OFFICE/OUTPT VISIT, EST, LEVL IV, 30-39 MIN: ICD-10-PCS | Mod: S$PBB,,, | Performed by: PHYSICIAN ASSISTANT

## 2019-10-10 PROCEDURE — 99214 OFFICE O/P EST MOD 30 MIN: CPT | Mod: S$PBB,,, | Performed by: PHYSICIAN ASSISTANT

## 2019-10-10 RX ORDER — HYDROCHLOROTHIAZIDE 12.5 MG/1
12.5 TABLET ORAL DAILY
Qty: 30 TABLET | Refills: 11 | Status: SHIPPED | OUTPATIENT
Start: 2019-10-10 | End: 2020-03-05 | Stop reason: SDUPTHER

## 2019-10-10 NOTE — PROGRESS NOTES
Subjective:       Patient ID: Caprice Gutiererz is a 74 y.o. female.        Chief Complaint: Blood Pressure Check    Caprice Gutierrez is an established patient of Nayely Garcia MD here today for follow up visit.    HTN - previously on olmesartan hct 40-25 mg daily but had two ED trips for generalized weakness that she attributed to dehydration from hctz, saw Dr. Garcia in f/u 8/2019 and olmesartan 40-25 mg stopped and instead started on just olmesartan 20 mg daily, previously on valsartan that caused dizziness, continued on carvedilol 25 mg BID, since stopping hctz 25 mg some fluid retention in ankles that bothers her    Home cuff 168/95  My reading 178/92    Home readings - 101/68, 116/78, 132/87, 05181, 114/74, 158/93, 114/79, 137/87, 104/69, 126/83, 176/99, 125/81    Unsure of cuff accuracy as it gives me several different readings over course of visit that were very different from my manual readings.  Omron cuff, generally good.  Has not changed batteries recently.    Anxiety - gets very anxious when she comes in and in general, takes xanax 1 mg prn    Vitamin d def - taking vitamin d 50,000 U weekly, vitamin d level 43 11/2018    Lipids - taking pravastatin 40 mg daily    Lab Results       Component                Value               Date                       CHOL                         194                 09/03/2019                 TRIG                            61                  09/03/2019                 HDL                             67                  09/03/2019                 LDLCALC                  114.8               09/03/2019              Prediabetes    Lab Results       Component                Value               Date                       HGBA1C                   5.9 (H)             09/03/2019                 HGBA1C                   5.7 (H)             11/14/2018                 HGBA1C                   5.8 (H)             02/21/2018                     Review of Systems    Constitutional: Negative for chills, diaphoresis, fatigue and fever.   HENT: Negative for congestion and sore throat.    Eyes: Negative for visual disturbance.   Respiratory: Negative for cough, chest tightness and shortness of breath.    Cardiovascular: Negative for chest pain, palpitations and leg swelling.   Gastrointestinal: Negative for abdominal pain, blood in stool, constipation, diarrhea, nausea and vomiting.   Genitourinary: Negative for dysuria, frequency, hematuria and urgency.   Musculoskeletal: Negative for arthralgias and back pain.   Skin: Negative for rash.   Neurological: Negative for dizziness, syncope, weakness and headaches.   Psychiatric/Behavioral: Negative for dysphoric mood and sleep disturbance. The patient is not nervous/anxious.        Objective:      Physical Exam   Constitutional: She appears well-developed and well-nourished.   HENT:   Head: Normocephalic.   Right Ear: External ear normal.   Left Ear: External ear normal.   Mouth/Throat: Oropharynx is clear and moist.   Eyes: Pupils are equal, round, and reactive to light.   Cardiovascular: Normal rate, regular rhythm and normal heart sounds. Exam reveals no gallop and no friction rub.   No murmur heard.  Pulmonary/Chest: Effort normal and breath sounds normal. No respiratory distress.   Abdominal: Soft. Normal appearance. There is no tenderness. There is no CVA tenderness.   Musculoskeletal: She exhibits no edema.   Neurological: She is alert.   Skin: Skin is warm and dry.   Psychiatric: She has a normal mood and affect.   Nursing note and vitals reviewed.      Assessment:       1. Hypertension, essential    2. Prediabetes    3. Vitamin D deficiency    4. Hyperlipidemia, unspecified hyperlipidemia type    5. Anxiety        Plan:       Caprice was seen today for blood pressure check.    Diagnoses and all orders for this visit:    Hypertension, essential - continue olmesartan 20 mg daily, add hctz 12.5 mg daily, f/u in 2 weeks  -      "hydroCHLOROthiazide (HYDRODIURIL) 12.5 MG Tab; Take 1 tablet (12.5 mg total) by mouth once daily.    Prediabetes - low carb diet, weight loss, exercise    Vitamin D deficiency - controlled    Hyperlipidemia, unspecified hyperlipidemia type    Anxiety - continue xanax prn    F/u in 2 weeks to evaluate BP and how she is tolerating lower dose of hctz.  Change batteries in BP cuff.  Bring cuff back next visit.    Pt has been given instructions populated from [facility] database and has verbalized understanding of the after visit summary and information contained wherein.    Follow up with a primary care provider. May go to ER for acute shortness of breath, lightheadedness, fever, or any other emergent complaints or changes in condition.    "This note will be shared with the patient"    Future Appointments   Date Time Provider Department Center   10/29/2019  9:00 AM Laura Diaz NP [facility]   10/29/2019 10:30 AM Shandra Day PA-C [facility]               "

## 2019-10-10 NOTE — PATIENT INSTRUCTIONS
Replace batteries in home blood pressure cuff  Next appointment bring cuff back with log of home blood pressure readings    Continue carvedilol 25 mg twice daily  Continue olmesartan 20 mg daily  START hctz (fluid pill) 12.5 mg daily

## 2019-10-28 ENCOUNTER — TELEPHONE (OUTPATIENT)
Dept: INTERNAL MEDICINE | Facility: CLINIC | Age: 74
End: 2019-10-28

## 2019-10-29 ENCOUNTER — LAB VISIT (OUTPATIENT)
Dept: LAB | Facility: HOSPITAL | Age: 74
End: 2019-10-29
Attending: PHYSICIAN ASSISTANT
Payer: MEDICARE

## 2019-10-29 ENCOUNTER — OFFICE VISIT (OUTPATIENT)
Dept: INTERNAL MEDICINE | Facility: CLINIC | Age: 74
End: 2019-10-29
Payer: MEDICARE

## 2019-10-29 VITALS
WEIGHT: 204.81 LBS | DIASTOLIC BLOOD PRESSURE: 82 MMHG | HEART RATE: 55 BPM | SYSTOLIC BLOOD PRESSURE: 142 MMHG | HEIGHT: 65 IN | BODY MASS INDEX: 34.12 KG/M2

## 2019-10-29 VITALS
DIASTOLIC BLOOD PRESSURE: 80 MMHG | WEIGHT: 203.94 LBS | BODY MASS INDEX: 33.98 KG/M2 | HEART RATE: 64 BPM | SYSTOLIC BLOOD PRESSURE: 140 MMHG | HEIGHT: 65 IN

## 2019-10-29 DIAGNOSIS — R73.03 PREDIABETES: ICD-10-CM

## 2019-10-29 DIAGNOSIS — E55.9 VITAMIN D DEFICIENCY: ICD-10-CM

## 2019-10-29 DIAGNOSIS — E78.5 HYPERLIPIDEMIA, UNSPECIFIED HYPERLIPIDEMIA TYPE: ICD-10-CM

## 2019-10-29 DIAGNOSIS — I10 ESSENTIAL HYPERTENSION: ICD-10-CM

## 2019-10-29 DIAGNOSIS — I10 HYPERTENSION, ESSENTIAL: ICD-10-CM

## 2019-10-29 DIAGNOSIS — I70.0 AORTIC ATHEROSCLEROSIS: ICD-10-CM

## 2019-10-29 DIAGNOSIS — E66.09 CLASS 1 OBESITY DUE TO EXCESS CALORIES WITH SERIOUS COMORBIDITY IN ADULT, UNSPECIFIED BMI: ICD-10-CM

## 2019-10-29 DIAGNOSIS — K21.9 GASTROESOPHAGEAL REFLUX DISEASE, ESOPHAGITIS PRESENCE NOT SPECIFIED: ICD-10-CM

## 2019-10-29 DIAGNOSIS — F41.9 ANXIETY: ICD-10-CM

## 2019-10-29 DIAGNOSIS — Z00.00 ENCOUNTER FOR PREVENTIVE HEALTH EXAMINATION: Primary | ICD-10-CM

## 2019-10-29 DIAGNOSIS — I10 HYPERTENSION, ESSENTIAL: Primary | ICD-10-CM

## 2019-10-29 PROBLEM — E66.811 CLASS 1 OBESITY DUE TO EXCESS CALORIES WITH SERIOUS COMORBIDITY IN ADULT: Status: ACTIVE | Noted: 2019-10-29

## 2019-10-29 LAB
ANION GAP SERPL CALC-SCNC: 10 MMOL/L (ref 8–16)
BUN SERPL-MCNC: 13 MG/DL (ref 8–23)
CALCIUM SERPL-MCNC: 9.5 MG/DL (ref 8.7–10.5)
CHLORIDE SERPL-SCNC: 100 MMOL/L (ref 95–110)
CO2 SERPL-SCNC: 30 MMOL/L (ref 23–29)
CREAT SERPL-MCNC: 1 MG/DL (ref 0.5–1.4)
EST. GFR  (AFRICAN AMERICAN): >60 ML/MIN/1.73 M^2
EST. GFR  (NON AFRICAN AMERICAN): 56 ML/MIN/1.73 M^2
GLUCOSE SERPL-MCNC: 101 MG/DL (ref 70–110)
POTASSIUM SERPL-SCNC: 3.6 MMOL/L (ref 3.5–5.1)
SODIUM SERPL-SCNC: 140 MMOL/L (ref 136–145)

## 2019-10-29 PROCEDURE — 99214 PR OFFICE/OUTPT VISIT, EST, LEVL IV, 30-39 MIN: ICD-10-PCS | Mod: S$PBB,,, | Performed by: PHYSICIAN ASSISTANT

## 2019-10-29 PROCEDURE — G0439 PR MEDICARE ANNUAL WELLNESS SUBSEQUENT VISIT: ICD-10-PCS | Mod: ,,, | Performed by: NURSE PRACTITIONER

## 2019-10-29 PROCEDURE — 99214 OFFICE O/P EST MOD 30 MIN: CPT | Mod: S$PBB,,, | Performed by: PHYSICIAN ASSISTANT

## 2019-10-29 PROCEDURE — 36415 COLL VENOUS BLD VENIPUNCTURE: CPT

## 2019-10-29 PROCEDURE — 80048 BASIC METABOLIC PNL TOTAL CA: CPT

## 2019-10-29 PROCEDURE — 99999 PR PBB SHADOW E&M-EST. PATIENT-LVL IV: CPT | Mod: PBBFAC,,, | Performed by: NURSE PRACTITIONER

## 2019-10-29 PROCEDURE — 99999 PR PBB SHADOW E&M-EST. PATIENT-LVL IV: ICD-10-PCS | Mod: PBBFAC,,, | Performed by: NURSE PRACTITIONER

## 2019-10-29 PROCEDURE — 99214 OFFICE O/P EST MOD 30 MIN: CPT | Mod: PBBFAC,27 | Performed by: PHYSICIAN ASSISTANT

## 2019-10-29 PROCEDURE — 99214 OFFICE O/P EST MOD 30 MIN: CPT | Mod: PBBFAC | Performed by: NURSE PRACTITIONER

## 2019-10-29 PROCEDURE — G0439 PPPS, SUBSEQ VISIT: HCPCS | Mod: ,,, | Performed by: NURSE PRACTITIONER

## 2019-10-29 PROCEDURE — 99999 PR PBB SHADOW E&M-EST. PATIENT-LVL IV: CPT | Mod: PBBFAC,,, | Performed by: PHYSICIAN ASSISTANT

## 2019-10-29 PROCEDURE — 99999 PR PBB SHADOW E&M-EST. PATIENT-LVL IV: ICD-10-PCS | Mod: PBBFAC,,, | Performed by: PHYSICIAN ASSISTANT

## 2019-10-29 RX ORDER — PYRIDOXINE HCL (VITAMIN B6) 100 MG
50 TABLET ORAL DAILY
COMMUNITY
End: 2020-10-23

## 2019-10-29 NOTE — PATIENT INSTRUCTIONS
Established High Blood Pressure    High blood pressure (hypertension) is a chronic disease. Often, healthcare providers dont know what causes it. But it can be caused by certain health conditions and medicines.  If you have high blood pressure, you may not have any symptoms. If you do have symptoms, they may include headache, dizziness, changes in your vision, chest pain, and shortness of breath. But even without symptoms, high blood pressure thats not treated raises your risk for heart attack and stroke. High blood pressure is a serious health risk and shouldnt be ignored.  A blood pressure reading is made up of two numbers: a higher number over a lower number. The top number is the systolic pressure. The bottom number is the diastolic pressure. A normal blood pressure is a systolic pressure of  less than 120 over a diastolic pressure of less than 80. You will see your blood pressure readings written together. For example, a person with a systolic pressure of 188 and a diastolic pressure of 78 will have 118/78 written in the medical record.  High blood pressure is when either the top number is 140 or higher, or the bottom number is 90 or higher. This must be the result when taking your blood pressure a number of times. The blood pressures between normal and high are called prehypertension.  Home care  If you have high blood pressure, you should do what is listed below to lower your blood pressure. If you are taking medicines for high blood pressure, these methods may reduce or end your need for medicines in the future.  · Begin a weight-loss program if you are overweight.  · Cut back on how much salt you get in your diet. Heres how to do this:  ¨ Dont eat foods that have a lot of salt. These include olives, pickles, smoked meats, and salted potato chips.  ¨ Dont add salt to your food at the table.  ¨ Use only small amounts of salt when cooking.  · Start an exercise program. Talk with your healthcare  provider about the type of exercise program that would be best for you. It doesn't have to be hard. Even brisk walking for 20 minutes 3 times a week is a good form of exercise.  · Dont take medicines that stimulate the heart. This includes many over-the-counter cold and sinus decongestant pills and sprays, as well as diet pills. Check the warnings about hypertension on the label. Before buying any over-the-counter medicines or supplements, always ask the pharmacist about the product's potential interaction with your high blood pressure and your high blood pressure medicines.  · Stimulants such as amphetamine or cocaine could be deadly for someone with high blood pressure. Never take these.  · Limit how much caffeine you get in your diet. Switch to caffeine-free products.  · Stop smoking. If you are a long-time smoker, this can be hard. Talk to your healthcare provider about medicines and nicotine replacement options to help you. Also, enroll in a stop-smoking program to make it more likely that you will quit for good.  · Learn how to handle stress. This is an important part of any program to lower blood pressure. Learn about relaxation methods like meditation, yoga, or biofeedback.  · If your provider prescribed medicines, take them exactly as directed. Missing doses may cause your blood pressure get out of control.  · If you miss a dose or doses, check with your healthcare provider or pharmacist about what to do.  · Consider buying an automatic blood pressure machine. Ask your provider for a recommendation. You can get one of these at most pharmacies.     The American Heart Association recommends the following guidelines for home blood pressure monitoring:  · Don't smoke or drink coffee for 30 minutes before taking your blood pressure.  · Go to the bathroom before the test.  · Relax for 5 minutes before taking the measurement.  · Sit with your back supported (don't sit on a couch or soft chair); keep your feet on  the floor uncrossed. Place your arm on a solid flat surface (like a table) with the upper part of the arm at heart level. Place the middle of the cuff directly above the eye of the elbow. Check the monitor's instruction manual for an illustration.  · Take multiple readings. When you measure, take 2 to 3 readings one minute apart and record all of the results.  · Take your blood pressure at the same time every day, or as your healthcare provider recommends.  · Record the date, time, and blood pressure reading.  · Take the record with you to your next medical appointment. If your blood pressure monitor has a built-in memory, simply take the monitor with you to your next appointment.  · Call your provider if you have several high readings. Don't be frightened by a single high blood pressure reading, but if you get several high readings, check in with your healthcare provider.  · Note: When blood pressure reaches a systolic (top number) of 180 or higher OR diastolic (bottom number) of 110 or higher, seek emergency medical treatment.  Follow-up care  You will need to see your healthcare provider regularly. This is to check your blood pressure and to make changes to your medicines. Make a follow-up appointment as directed. Bring the record of your home blood pressure readings to the appointment.  When to seek medical advice  Call your healthcare provider right away if any of these occur:  · Blood pressure reaches a systolic (upper number) of 180 or higher OR a diastolic (bottom number) of 110 or higher  · Chest pain or shortness of breath  · Severe headache  · Throbbing or rushing sound in the ears  · Nosebleed  · Sudden severe pain in your belly (abdomen)  · Extreme drowsiness, confusion, or fainting  · Dizziness or spinning sensation (vertigo)  · Weakness of an arm or leg or one side of the face  · You have problems speaking or seeing   Date Last Reviewed: 12/1/2016  © 9135-2718 [employer]. [address] [place] [address]  [place] [address]. All rights reserved. This information is not intended as a substitute for professional medical care. Always follow your healthcare professional's instructions.

## 2019-10-29 NOTE — PROGRESS NOTES
Subjective:       Patient ID: Caprice Gutierrez is a 74 y.o. female.        Chief Complaint: Follow-up (B/P)    Caprice Gutierrez is an established patient of Nayely Garcia MD here today for follow up visit.     HTN -   10/29/19  Last visit decided to continue olmesartan 20 mg daily, carvedilol 25 mg BID  Added hctz 12.5 mg secondary to ankle swelling  Here today and she has stopped olmesartan as it was causing leg cramps, reports that upon stopping the olmesartan the leg cramps have resolved  For the past 2 weeks she has been taking carvedilol 25 mg BID and hctz 12.5 mg with no ill effects and acceptable BP at home, in office BP is much better compared to last visit although still not quite at goal    10/10/19  Previously on olmesartan hct 40-25 mg daily but had two ED trips for generalized weakness that she attributed to dehydration from hctz, saw Dr. Garcia in f/u 8/2019 and olmesartan 40-25 mg stopped and instead started on just olmesartan 20 mg daily, previously on valsartan that caused dizziness, continued on carvedilol 25 mg BID, since stopping hctz 25 mg some fluid retention in ankles that bothers her    Anxiety - gets very anxious when she comes in and in general, takes xanax 1 mg prn     Vitamin d def - taking vitamin d 50,000 U weekly, vitamin d level 43 11/2018     Lipids - taking pravastatin 40 mg daily     Lab Results       Component                Value               Date                       CHOL                         194                 09/03/2019                 TRIG                            61                  09/03/2019                 HDL                             67                  09/03/2019                 LDLCALC                  114.8               09/03/2019               Prediabetes     Lab Results       Component                Value               Date                       HGBA1C                   5.9 (H)             09/03/2019                 HGBA1C                    5.7 (H)             11/14/2018                 HGBA1C                   5.8 (H)             02/21/2018                    Review of Systems   Constitutional: Negative for chills, diaphoresis, fatigue and fever.   HENT: Negative for congestion and sore throat.    Eyes: Negative for visual disturbance.   Respiratory: Negative for cough, chest tightness and shortness of breath.    Cardiovascular: Negative for chest pain, palpitations and leg swelling.   Gastrointestinal: Negative for abdominal pain, blood in stool, constipation, diarrhea, nausea and vomiting.   Genitourinary: Negative for dysuria, frequency, hematuria and urgency.   Musculoskeletal: Negative for arthralgias and back pain.   Skin: Negative for rash.   Neurological: Negative for dizziness, syncope, weakness and headaches.   Psychiatric/Behavioral: Negative for dysphoric mood and sleep disturbance. The patient is not nervous/anxious.        Objective:      Physical Exam   Constitutional: She appears well-developed and well-nourished.   HENT:   Head: Normocephalic.   Right Ear: External ear normal.   Left Ear: External ear normal.   Mouth/Throat: Oropharynx is clear and moist.   Eyes: Pupils are equal, round, and reactive to light.   Cardiovascular: Normal rate, regular rhythm and normal heart sounds. Exam reveals no gallop and no friction rub.   No murmur heard.  Pulmonary/Chest: Effort normal and breath sounds normal. No respiratory distress.   Abdominal: Soft. Normal appearance. There is no tenderness. There is no CVA tenderness.   Musculoskeletal: She exhibits no edema.   Neurological: She is alert.   Skin: Skin is warm and dry.   Psychiatric: She has a normal mood and affect.   Nursing note and vitals reviewed.      Assessment:       1. Hypertension, essential    2. Prediabetes    3. Vitamin D deficiency    4. Hyperlipidemia, unspecified hyperlipidemia type    5. Anxiety        Plan:       Caprice was seen today for follow-up.    Diagnoses and all  "orders for this visit:    Hypertension, essential - continue carvedilol 25 mg BID and hctz 12.5 mg daily, f/u in 4-6 weeks  -     Basic metabolic panel; Future    Prediabetes - weight loss, exercise, low carb diet    Vitamin D deficiency - continue supplement    Hyperlipidemia, unspecified hyperlipidemia type - does not want to take pravastatin and working on diet    Anxiety - continue xanax prn    F/u in 4-6 weeks.    Pt has been given instructions populated from [employer] database and has verbalized understanding of the after visit summary and information contained wherein.    Follow up with a primary care provider. May go to ER for acute shortness of breath, lightheadedness, fever, or any other emergent complaints or changes in condition.    "This note will be shared with the patient"    Future Appointments   Date Time Provider Department Center   12/9/2019 10:00 AM Shandra Day PA-C [facility]               "

## 2019-10-29 NOTE — PROGRESS NOTES
"Caprice Gutierrez presented for a  Medicare AWV and comprehensive Health Risk Assessment today. The following components were reviewed and updated:    · Medical history  · Family History  · Social history  · Allergies and Current Medications  · Health Risk Assessment  · Health Maintenance  · Care Team     ** See Completed Assessments for Annual Wellness Visit within the encounter summary.**       The following assessments were completed:  · Living Situation  · CAGE  · Depression Screening  · Timed Get Up and Go  · Whisper Test  · Cognitive Function Screening      ·   · Nutrition Screening  · ADL Screening  · PAQ Screening    Vitals:    10/29/19 0907 10/29/19 0932   BP: (!) 140/84 (!) 142/82   BP Location: Right arm Right arm   Pulse: (!) 55    Weight: 92.9 kg (204 lb 12.9 oz)    Height: 5' 5" (1.651 m)      Body mass index is 34.08 kg/m².  Physical Exam   Constitutional: She is oriented to person, place, and time.   Obese   HENT:   Head: Normocephalic.   Cardiovascular: Normal rate and regular rhythm.   Pulmonary/Chest: Effort normal and breath sounds normal.   Abdominal: Soft. Bowel sounds are normal.   Musculoskeletal: Normal range of motion. She exhibits no edema.   Neurological: She is alert and oriented to person, place, and time.   Skin: Skin is warm and dry.   Psychiatric: She has a normal mood and affect.   Nursing note and vitals reviewed.        Diagnoses and health risks identified today and associated recommendations/orders:    1. Encounter for preventive health examination  Here for Health Risk Assessment/Annual Wellness Visit.  Health maintenance reviewed and updated. Follow up in one year.    2. Essential hypertension  Chronic, B/P not at goal today. Has B/P follow up after HRA. Followed by PCP.    3. Aortic atherosclerosis  Chronic, stable on current medications. Noted Lumbar XR 6/21/04. Followed by PCP.    4. Hyperlipidemia, unspecified hyperlipidemia type  Chronic, stable on current medications. " Followed by PCP.    5. Prediabetes  Chronic, stable with diet. Last A1c 5.9.  Followed by PCP.    6. Vitamin D deficiency  Chronic, stable on current medication. Followed by PCP.    7. Class 1 obesity due to excess calories with serious comorbidity in adult, unspecified BMI  Chronic, stable. Reinforced diet/exercise per PCP recommendations. Followed by PCP.    8. Gastroesophageal reflux disease, esophagitis presence not specified  Chronic, stable on current medication. Followed by PCP.      Provided Caprice with a 5-10 year written screening schedule and personal prevention plan. Recommendations were developed using the USPSTF age appropriate recommendations. Education, counseling, and referrals were provided as needed. After Visit Summary printed and given to patient which includes a list of additional screenings\tests needed.    Follow up in about 19 weeks (around 3/10/2020).    Laura Diaz NP

## 2019-10-29 NOTE — PATIENT INSTRUCTIONS
Counseling and Referral of Other Preventative  (Italic type indicates deductible and co-insurance are waived)    Patient Name: Caprice Gutierrez  Today's Date: 10/29/2019    Health Maintenance       Date Due Completion Date    High Dose Statin 09/18/1966 7/15/2019    Eye Exam 08/28/2018 8/28/2017 (ClinicallyNA)    Override on 8/28/2017: Not Clinically Appropriate (pre diabetes. not indicated)    DEXA SCAN 07/28/2019 7/28/2014    Override on 10/26/2005: Done    Foot Exam 12/18/2019 12/18/2018    Override on 8/28/2017: Done    Influenza Vaccine (1) 09/01/2020 (Originally 9/1/2019) 12/17/2013 (Declined)    Override on 12/17/2013: Declined    TETANUS VACCINE 10/29/2020 (Originally 9/18/1963) Consider obtaining    Shingles Vaccine (1 of 2) 10/29/2020 (Originally 9/18/1995) Obtain new shingles vaccine - SHINGRIX - when available    Hemoglobin A1c 03/03/2020 9/3/2019    Mammogram 07/15/2020 7/15/2019    Lipid Panel 09/03/2020 9/3/2019    Colonoscopy 07/12/2021 7/12/2016        No orders of the defined types were placed in this encounter.    The following information is provided to all patients.  This information is to help you find resources for any of the problems found today that may be affecting your health:                Living healthy guide: www.[facility].[place].gov      Understanding Diabetes: www.diabetes.org      Eating healthy: www.cdc.gov/healthyweight      CDC home safety checklist: www.cdc.gov/steadi/patient.html      Agency on Aging: www.goea.[place].[facility]      Alcoholics anonymous (AA): www.aa.org      Physical Activity: www.aminah.nih.gov/oa1kjab      Tobacco use: www.quitwithusla.org     Low-Salt Diet  This diet removes foods that are high in salt. It also limits the amount of salt you use when cooking. It is most often used for people with high blood pressure, edema (fluid retention), and kidney, liver, or heart disease.  Table salt contains the mineral sodium. Your body needs sodium to work normally. But  too much sodium can make your health problems worse. Your healthcare provider is recommending a low-salt (also called low-sodium) diet for you. Your total daily allowance of salt is 1,500 to 2,300 milligrams (mg). It is less than 1 teaspoon of table salt. This means you can have only about 500 to 700 mg of sodium at each meal. People with certain health problems should limit salt intake to the lower end of the recommended range.    When you cook, dont add much salt. If you can cook without using salt, even better. Dont add salt to your food at the table.  When shopping, read food labels. Salt is often called sodium on the label. Choose foods that are salt-free, low salt, or very low salt. Note that foods with reduced salt may not lower your salt intake enough.    Beans, potatoes, and pasta  Ok: Dry beans, split peas, lentils, potatoes, rice, macaroni, pasta, spaghetti without added salt  Avoid: Potato chips, tortilla chips, and similar products  Breads and cereals  Ok: Low-sodium breads, rolls, cereals, and cakes; low-salt crackers, matzo crackers  Avoid: Salted crackers, pretzels, popcorn, Persian toast, pancakes, muffins  Dairy  Ok: Milk, chocolate milk, hot chocolate mix, low-salt cheeses, and yogurt  Avoid: Processed cheese and cheese spreads; Roquefort, Camembert, and cottage cheese; buttermilk, instant breakfast drink  Desserts  Ok: Ice cream, frozen yogurt, juice bars, gelatin, cookies and pies, sugar, honey, jelly, hard candy  Avoid: Most pies, cakes and cookies prepared or processed with salt; instant pudding  Drinks  Ok: Tea, coffee, fizzy (carbonated) drinks, juices  Avoid: Flavored coffees, electrolyte replacement drinks, sports drinks  Meats  Ok: All fresh meat, fish, poultry, low-salt tuna, eggs, egg substitute  Avoid: Smoked, pickled, brine-cured, or salted meats and fish. This includes winston, chipped beef, corned beef, hot dogs, deli meats, ham, kosher meats, salt pork, sausage, canned tuna, salted  codfish, smoked salmon, herring, sardines, or anchovies.  Seasonings and spices  Ok: Most seasonings are okay. Good substitutes for salt include: fresh herb blends, hot sauce, lemon, garlic, ochoa, vinegar, dry mustard, parsley, cilantro, horseradish, tomato paste, regular margarine, mayonnaise, unsalted butter, cream cheese, vegetable oil, cream, low-salt salad dressing and gravy.  Avoid: Regular ketchup, relishes, pickles, soy sauce, teriyaki sauce, Worcestershire sauce, BBQ sauce, tartar sauce, meat tenderizer, chili sauce, regular gravy, regular salad dressing, salted butter  Soups  Ok: Low-salt soups and broths made with allowed foods  Avoid: Bouillon cubes, soups with smoked or salted meats, regular soup and broth  Vegetables  Ok: Most vegetables are okay; also low-salt tomato and vegetable juices  Avoid: Sauerkraut and other brine-soaked vegetables; pickles and other pickled vegetables; tomato juice, olives  Date Last Reviewed: 8/1/2016  © 8574-3543 [employer]. [address] [place] [address], [place] [address]. All rights reserved. This information is not intended as a substitute for professional medical care. Always follow your healthcare professional's instructions.        Tips for Using Less Salt    Most people with heart problems need to eat less salt (sodium). Reducing the amount of salt you eat may help control your blood pressure. The higher your blood pressure, the greater your risk for heart disease, stroke, blindness, and kidney problems.  At the store  · Make low-salt choices by reading labels carefully. Look for the total amount of sodium per serving.  · Use more fresh food. Buy more fruits and vegetables. Select lean meats, fish, and poultry.  · Use fewer frozen, canned, and packaged foods which often contain a lot of sodium.  · Use plain frozen vegetables without sauces or toppings. These products are often low- or no-sodium.  · Opt for reduced-sodium or no-salt-added versions of canned  vegetables and soups.  In the kitchen  · Don't add salt to food when you're cooking. Season with flavorings such as onion, garlic, pepper, salt-free herbal blends, and lemon or lime juice.  · Use a cookbook containing low-salt recipes. It can give you ideas for tasty meals that are healthy for your heart.  · Sprinkle salt-free herbal blends on vegetables and meat.  · Drain and rinse canned foods, such as canned beans and vegetables, before cooking or eating.  Eating out  · Tell the  you're on a low-salt diet. Ask questions about the menu.  · Order fish, chicken, and meat broiled, baked, poached, or grilled without salt, butter, or breading.  · Use lemon, pepper, and salt-free herb mixes to add flavor.  · Choose plain steamed rice, boiled noodles, and baked or boiled potatoes. Top potatoes with chives and a little sour cream.     Beware! Salt goes by many other names. Limit foods with these words listed as ingredients: salt, sodium, soy sauce, baking soda, baking powder, MSG, monosodium, Na (the chemical symbol for sodium). Some antacids are also high in salt.   Date Last Reviewed: 6/19/2015  © 1498-9933 [employer]. [address] [place] [address]. All rights reserved. This information is not intended as a substitute for professional medical care. Always follow your healthcare professional's instructions.        Low-Salt Choices  Eating salt (sodium) can make your body retain too much water. Excess water makes your heart work harder. Canned, packaged, and frozen foods are easy to prepare, but they are often high in sodium. Here are some ideas for low-salt foods you can easily prepare yourself.    For breakfast  · Fruit or 100% fruit juice  · Whole-wheat bread or an English muffin. Compare sodium content on labels.  · Low-fat milk or yogurt  · Unsalted eggs  · Shredded wheat  · Corn tortillas  · Unsalted steamed rice  · Regular (not instant) hot cereal, made without salt  Stay away  from:  · Sausage, winston, and ham  · Flour tortillas  · Packaged muffins, pancakes, and biscuits  · Instant hot cereals  · Cottage cheese  For lunch and dinner  · Fresh fish, chicken, turkey, or meat--baked, broiled, or roasted without salt  · Dry beans, cooked without salt  · Tofu, stir-fried without salt  · Unsalted fresh fruit and vegetables, or frozen or canned fruit and vegetables with no added salt  Stay away from:  · Lunch or deli meat that is cured or smoked  · Cheese  · Tomato juice and catsup  · Canned vegetables, soups, and fish not labeled as no-salt-added or reduced sodium  · Packaged gravies and sauces  · Olives, pickles, and relish  · Bottled salad dressings  For snacks and desserts  · Yogurt  · Unsalted, air popped popcorn  · Unsalted nuts or seeds  Stay away from:  · Pies and cakes  · Packaged dessert mixes  · Pizza  · Canned and packaged puddings  · Pretzels, chips, crackers, and nuts--unless the label says unsalted  Date Last Reviewed: 6/17/2015  © 1924-1136 [employer]. [address] [place] [address], [place] [address]. All rights reserved. This information is not intended as a substitute for professional medical care. Always follow your healthcare professional's instructions.        Eating Heart-Healthy Food: Using the DASH Plan    Eating for your heart doesnt have to be hard or boring. You just need to know how to make healthier choices. The DASH eating plan has been developed to help you do just that. DASH stands for Dietary Approaches to Stop Hypertension. It is a plan that has been proven to be healthier for your heart and to lower your risk for high blood pressure. It can also help lower your risk for cancer, heart disease, osteoporosis, and diabetes.  Choosing from each food group  Choose foods from each of the food groups below each day. Try to get the recommended number of servings for each food group. The serving numbers are based on a diet of 2,000 calories a day. Talk to your  doctor if youre unsure about your calorie needs. Along with getting the correct servings, the DASH plan also recommends a sodium intake less than 2,300 mg per day.        Grains  Servings: 6 to 8 a day  A serving is:  · 1 slice bread  · 1 ounce dry cereal  · Half a cup cooked rice, pasta or cereal  Best choices: Whole grains and any grains high in fiber. Vegetables  Servings: 4 to 5 a day  A serving is:  · 1 cup raw leafy vegetable  · Half a cup cut-up raw or cooked vegetable  · Half a cup vegetable juice  Best choices: Fresh or frozen vegetables prepared without added salt or fat.   Fruits  Servings: 4 to 5 a day  A serving is:  · 1 medium fruit  · One-quarter cup dried fruit  · Half a cup fresh, frozen, or canned fruit  · Half a cup of 100% fruit juices  Best choices: A variety of fresh fruits of different colors. Whole fruits are a better choice than fruit juices. Low-fat or fat-free dairy  Servings: 2 to 3 a day  A serving is:  · 1 cup milk  · 1 cup yogurt  · One and a half ounces cheese  Best choices: Skim or 1% milk, low-fat or fat-free yogurt or buttermilk, and low-fat cheeses.         Lean meats, poultry, fish  Servings: 6 or fewer a day  A serving is:  · 1 ounce cooked meats, poultry, or fish  · 1 egg  Best choices: Lean poultry and fish. Trim away visible fat. Broil, grill, roast, or boil instead of frying. Remove skin from poultry before eating. Limit how much red meat you eat.  Nuts, seeds, beans  Servings: 4 to 5 a week  A serving is:  · One-third cup nuts (one and a half ounces)  · 2 tablespoons nut butter or seeds  · Half a cup cooked dry beans or legumes  Best choices: Dry roasted nuts with no salt added, lentils, kidney beans, garbanzo beans, and whole jernigan beans.   Fats and oils  Servings: 2 to 3 a day  A serving is:  · 1 teaspoon vegetable oil  · 1 teaspoon soft margarine  · 1 tablespoon mayonnaise  · 2 tablespoons salad dressing  Best choices: Nut and vegetable oils (nontropical vegetable  oils), such as olive and canola oil. Sweets  Servings: 5 a week or fewer  A serving is:  · 1 tablespoon sugar, maple syrup, or honey  · 1 tablespoon jam or jelly  · 1 half-ounce jelly beans (about 15)  · 1 cup lemonade  Best choices: Dried fruit can be a satisfying sweet. Choose low-fat sweets. And watch your serving sizes!      For more on the DASH eating plan, visit:  www.nhlbi.nih.gov/health/health-topics/topics/dash   Date Last Reviewed: 6/1/2016 © 2000-2017 [employer]. [address] [place] [address], [place] [address]. All rights reserved. This information is not intended as a substitute for professional medical care. Always follow your healthcare professional's instructions.        Eating Heart-Healthy Food: Using the DASH Plan    Eating for your heart doesnt have to be hard or boring. You just need to know how to make healthier choices. The DASH eating plan has been developed to help you do just that. DASH stands for Dietary Approaches to Stop Hypertension. It is a plan that has been proven to be healthier for your heart and to lower your risk for high blood pressure. It can also help lower your risk for cancer, heart disease, osteoporosis, and diabetes.  Choosing from each food group  Choose foods from each of the food groups below each day. Try to get the recommended number of servings for each food group. The serving numbers are based on a diet of 2,000 calories a day. Talk to your doctor if youre unsure about your calorie needs. Along with getting the correct servings, the DASH plan also recommends a sodium intake less than 2,300 mg per day.        Grains  Servings: 6 to 8 a day  A serving is:  · 1 slice bread  · 1 ounce dry cereal  · Half a cup cooked rice, pasta or cereal  Best choices: Whole grains and any grains high in fiber. Vegetables  Servings: 4 to 5 a day  A serving is:  · 1 cup raw leafy vegetable  · Half a cup cut-up raw or cooked vegetable  · Half a cup vegetable juice  Best  choices: Fresh or frozen vegetables prepared without added salt or fat.   Fruits  Servings: 4 to 5 a day  A serving is:  · 1 medium fruit  · One-quarter cup dried fruit  · Half a cup fresh, frozen, or canned fruit  · Half a cup of 100% fruit juices  Best choices: A variety of fresh fruits of different colors. Whole fruits are a better choice than fruit juices. Low-fat or fat-free dairy  Servings: 2 to 3 a day  A serving is:  · 1 cup milk  · 1 cup yogurt  · One and a half ounces cheese  Best choices: Skim or 1% milk, low-fat or fat-free yogurt or buttermilk, and low-fat cheeses.         Lean meats, poultry, fish  Servings: 6 or fewer a day  A serving is:  · 1 ounce cooked meats, poultry, or fish  · 1 egg  Best choices: Lean poultry and fish. Trim away visible fat. Broil, grill, roast, or boil instead of frying. Remove skin from poultry before eating. Limit how much red meat you eat.  Nuts, seeds, beans  Servings: 4 to 5 a week  A serving is:  · One-third cup nuts (one and a half ounces)  · 2 tablespoons nut butter or seeds  · Half a cup cooked dry beans or legumes  Best choices: Dry roasted nuts with no salt added, lentils, kidney beans, garbanzo beans, and whole jernigan beans.   Fats and oils  Servings: 2 to 3 a day  A serving is:  · 1 teaspoon vegetable oil  · 1 teaspoon soft margarine  · 1 tablespoon mayonnaise  · 2 tablespoons salad dressing  Best choices: Nut and vegetable oils (nontropical vegetable oils), such as olive and canola oil. Sweets  Servings: 5 a week or fewer  A serving is:  · 1 tablespoon sugar, maple syrup, or honey  · 1 tablespoon jam or jelly  · 1 half-ounce jelly beans (about 15)  · 1 cup lemonade  Best choices: Dried fruit can be a satisfying sweet. Choose low-fat sweets. And watch your serving sizes!      For more on the DASH eating plan, visit:  www.nhlbi.nih.gov/health/health-topics/topics/dash   Date Last Reviewed: 6/1/2016  © 7568-9264 [employer]. [address] [facility],  [place] [address]. All rights reserved. This information is not intended as a substitute for professional medical care. Always follow your healthcare professional's instructions.

## 2019-11-20 RX ORDER — CARVEDILOL 25 MG/1
TABLET ORAL
Qty: 60 TABLET | Refills: 11 | Status: SHIPPED | OUTPATIENT
Start: 2019-11-20 | End: 2020-06-09

## 2020-01-30 ENCOUNTER — PATIENT OUTREACH (OUTPATIENT)
Dept: ADMINISTRATIVE | Facility: HOSPITAL | Age: 75
End: 2020-01-30

## 2020-01-30 DIAGNOSIS — M81.0 OSTEOPOROSIS, UNSPECIFIED OSTEOPOROSIS TYPE, UNSPECIFIED PATHOLOGICAL FRACTURE PRESENCE: ICD-10-CM

## 2020-01-30 DIAGNOSIS — E55.9 VITAMIN D DEFICIENCY: Primary | ICD-10-CM

## 2020-02-13 ENCOUNTER — TELEPHONE (OUTPATIENT)
Dept: INTERNAL MEDICINE | Facility: CLINIC | Age: 75
End: 2020-02-13

## 2020-02-14 ENCOUNTER — TELEPHONE (OUTPATIENT)
Dept: INTERNAL MEDICINE | Facility: CLINIC | Age: 75
End: 2020-02-14

## 2020-02-14 NOTE — TELEPHONE ENCOUNTER
Tried to contact pt / Left a message for patient to call the office back to see if she was requesting her annual labs to be ordered/scheduled so I can ask Dr Garcia to have the orders put in.

## 2020-02-14 NOTE — TELEPHONE ENCOUNTER
----- Message from Caroline Pham sent at 2/14/2020  2:08 PM CST -----  Contact: self  or   Patient is returning a phone call.  Who left a message for the patient: Sonya Peterson MA  Does patient know what this is regarding: appt  Comments: Pt states she cancelled the 2/13 over a week ago b/c she was feeling better. Pt is also asking about having her lab work scheduled at the [facility] in March.

## 2020-03-05 ENCOUNTER — OFFICE VISIT (OUTPATIENT)
Dept: INTERNAL MEDICINE | Facility: CLINIC | Age: 75
End: 2020-03-05
Payer: MEDICARE

## 2020-03-05 VITALS
HEIGHT: 65 IN | DIASTOLIC BLOOD PRESSURE: 82 MMHG | BODY MASS INDEX: 33.06 KG/M2 | OXYGEN SATURATION: 99 % | HEART RATE: 60 BPM | SYSTOLIC BLOOD PRESSURE: 142 MMHG | WEIGHT: 198.44 LBS

## 2020-03-05 DIAGNOSIS — R73.03 PRE-DIABETES: ICD-10-CM

## 2020-03-05 DIAGNOSIS — I70.0 AORTIC ATHEROSCLEROSIS: ICD-10-CM

## 2020-03-05 DIAGNOSIS — R10.13 DYSPEPSIA: ICD-10-CM

## 2020-03-05 DIAGNOSIS — E78.5 HYPERLIPIDEMIA, UNSPECIFIED HYPERLIPIDEMIA TYPE: Primary | ICD-10-CM

## 2020-03-05 DIAGNOSIS — I10 HYPERTENSION, ESSENTIAL: ICD-10-CM

## 2020-03-05 PROCEDURE — 99213 OFFICE O/P EST LOW 20 MIN: CPT | Mod: PBBFAC | Performed by: INTERNAL MEDICINE

## 2020-03-05 PROCEDURE — 99214 PR OFFICE/OUTPT VISIT, EST, LEVL IV, 30-39 MIN: ICD-10-PCS | Mod: S$PBB,,, | Performed by: INTERNAL MEDICINE

## 2020-03-05 PROCEDURE — 99214 OFFICE O/P EST MOD 30 MIN: CPT | Mod: S$PBB,,, | Performed by: INTERNAL MEDICINE

## 2020-03-05 PROCEDURE — 99999 PR PBB SHADOW E&M-EST. PATIENT-LVL III: ICD-10-PCS | Mod: PBBFAC,,, | Performed by: INTERNAL MEDICINE

## 2020-03-05 PROCEDURE — 99999 PR PBB SHADOW E&M-EST. PATIENT-LVL III: CPT | Mod: PBBFAC,,, | Performed by: INTERNAL MEDICINE

## 2020-03-05 RX ORDER — HYDROCHLOROTHIAZIDE 25 MG/1
25 TABLET ORAL DAILY
Qty: 30 TABLET | Refills: 11 | Status: SHIPPED | OUTPATIENT
Start: 2020-03-05 | End: 2020-06-09

## 2020-03-05 RX ORDER — ALPRAZOLAM 1 MG/1
TABLET ORAL
Qty: 30 TABLET | Refills: 2 | Status: SHIPPED | OUTPATIENT
Start: 2020-03-05 | End: 2020-08-09

## 2020-03-05 RX ORDER — HYDROGEN PEROXIDE 3 %
20 SOLUTION, NON-ORAL MISCELLANEOUS
COMMUNITY

## 2020-03-05 NOTE — PROGRESS NOTES
Subjective:       Patient ID: Caprice Gutierrez is a 74 y.o. female.    Chief Complaint: Gastroesophageal Reflux    HPI   C/o  Belching, nausea since 2/21..  Made herself vomit 2/21, followed by nausea, diarrhea.  Good days followed by bad.  Eating normally.  No chest burning, eructation.  She started Nexium recently    Our machine 142/82.  hers 139/88.  Home readings:  124/81, 144/80, 124/70, 108/63, 140/83.  Repeat:  172/ 94    Stressed - financial stress at her [employer].    She is chronically anxious.  Takes half-1 tab of xanax daily.    She has ao atherosclerosis, tx with pravastatin.   Prediabetes assoc with htn  Review of Systems   Constitutional: Negative for fever and unexpected weight change.   HENT: Negative for congestion and postnasal drip.    Eyes: Negative for pain, discharge and visual disturbance.   Respiratory: Negative for cough, chest tightness, shortness of breath and wheezing.    Cardiovascular: Negative for chest pain and leg swelling.   Gastrointestinal: Negative for abdominal pain, constipation, diarrhea and nausea.   Genitourinary: Negative for difficulty urinating, dysuria and hematuria.   Skin: Negative for rash.   Neurological: Negative for headaches.   Psychiatric/Behavioral: Negative for dysphoric mood and sleep disturbance. The patient is not nervous/anxious.        Objective:      Physical Exam   Constitutional: She appears well-developed and well-nourished. No distress.   Abdominal: Soft. Bowel sounds are normal. She exhibits no distension. There is no tenderness.   Psychiatric: She has a normal mood and affect. Her behavior is normal.       Assessment:       1. Hyperlipidemia, unspecified hyperlipidemia type    2. Hypertension, essential    3. Aortic atherosclerosis    4. Pre-diabetes    5. Dyspepsia        Plan:       Caprice was seen today for gastroesophageal reflux.    Diagnoses and all orders for this visit:    Hyperlipidemia, unspecified hyperlipidemia type  -     Lipid  panel; Future    Hypertension, essential  -     hydroCHLOROthiazide (HYDRODIURIL) 25 MG tablet; Take 1 tablet (25 mg total) by mouth once daily.  -     CBC auto differential; Future  -     Comprehensive metabolic panel; Future    Aortic atherosclerosis    Pre-diabetes  -     Hemoglobin A1c; Future    Dyspepsia    Other orders  -     ALPRAZolam (XANAX) 1 MG tablet; Half-1 tab po q day prn anxiety       Reassurance

## 2020-03-19 ENCOUNTER — TELEPHONE (OUTPATIENT)
Dept: INTERNAL MEDICINE | Facility: CLINIC | Age: 75
End: 2020-03-19

## 2020-03-19 DIAGNOSIS — R10.13 EPIGASTRIC PAIN: Primary | ICD-10-CM

## 2020-03-19 RX ORDER — ONDANSETRON HYDROCHLORIDE 8 MG/1
8 TABLET, FILM COATED ORAL EVERY 12 HOURS PRN
Qty: 20 TABLET | Refills: 0 | Status: SHIPPED | OUTPATIENT
Start: 2020-03-19 | End: 2023-05-10

## 2020-03-19 NOTE — TELEPHONE ENCOUNTER
She should see GI then.   They may want to scope stomach.   Would she like a med for nausea (xofran)?  We could also check stool for H Pylori - to make sure it has not returned.

## 2020-03-19 NOTE — TELEPHONE ENCOUNTER
Pt was here on 3/5 and states that she is having the same symptoms with feeling nausea and belching in the morning before she eat. Pt states that she is feeling the same way she felt when she had the H pylori and continued to take the nexium for two weeks like you told her but nothing has changed

## 2020-03-19 NOTE — TELEPHONE ENCOUNTER
----- Message from Caitlyn Mei sent at 3/19/2020 11:51 AM CDT -----  Contact: [phone]  Patient would like to speak to the nurse to the nurse in regards to a personal matter. Please call and advise.

## 2020-03-23 ENCOUNTER — TELEPHONE (OUTPATIENT)
Dept: GASTROENTEROLOGY | Facility: CLINIC | Age: 75
End: 2020-03-23

## 2020-03-23 NOTE — TELEPHONE ENCOUNTER
Spoke with pt and she has c/o reflux in the morning and at night. Pt would like to postpone the appointment until the COVID-19 virus is over. Informed pt that she can take the Omeprazole 20mg OTC BID. Patient will try this and let us know if it is helpful or not.

## 2020-03-23 NOTE — TELEPHONE ENCOUNTER
----- Message from Connie Curiel sent at 3/23/2020  9:04 AM CDT -----  Contact: Self [phone]  Patient is calling to talk to nurse in regards to her Acid Reflux and would like to see if the doctor can call in medication.

## 2020-05-07 ENCOUNTER — TELEPHONE (OUTPATIENT)
Dept: INTERNAL MEDICINE | Facility: CLINIC | Age: 75
End: 2020-05-07

## 2020-05-07 NOTE — TELEPHONE ENCOUNTER
Please call patient to schedule an appointment to f/u on her BP - med changes made 3/5/20 - can schedule a virtual visit if she prefers because home BP cuff previously checked and accurate - also okay to schedule in person visit.  Remind her I'm the PA that works with Dr. Garcia.  She last saw me 10/2019.

## 2020-05-08 NOTE — TELEPHONE ENCOUNTER
Called patient and notified her to scheduled an appointment for virtual visit or come in to the office to be seen.     Patient declined both requesting stating to she has been taking her BP daily.    She states that she has no technology in her home..I have recommended that this patient can use her phone. Patient declined and stated that she doing fine.

## 2020-05-21 ENCOUNTER — TELEPHONE (OUTPATIENT)
Dept: INTERNAL MEDICINE | Facility: CLINIC | Age: 75
End: 2020-05-21

## 2020-05-21 NOTE — TELEPHONE ENCOUNTER
----- Message from Wen Oropeza sent at 5/21/2020  3:45 PM CDT -----  Contact: Pt-- [phone]  Type:  Needs Medical Advice    Who Called:  Pt    Symptoms (please be specific): nauseas and weak after sleeping    Would the patient rather a call back or a response via [employer]? Call    Best Call Back Number:  [phone]    Additional Information:  Pt called to speak with dr or nurse regarding her symptoms when she wakes in the morning or from a nap. She is requesting a call back.

## 2020-05-21 NOTE — TELEPHONE ENCOUNTER
I am unclear about what stool study she wants-   She had colonoscopy 2016 so doesn't need stool for blood.  Does she think she is bleeding from gi tract?     Other stool studies are for diarrhea, which she is not having    normal...

## 2020-05-21 NOTE — TELEPHONE ENCOUNTER
Pt says when she wakes up from sleeping whether its in the morning or a nap she feels nauseous and weak. pt says it only last for a little while but she is unsure why it is happening. Pt does not want to come into clinic and not willing to schedule virtual appt. Her gastro appt is July and will not schedule a sooner appt. No constipation, no diarrhea. She does want to know if she can submit stool sample and supplies be mailed to her so she does not need to come in. The only symptoms she is having is nausea and weakness when waking up. Please advise

## 2020-05-22 NOTE — TELEPHONE ENCOUNTER
Spoke with pt notified of message below. Pt says she is feeling much better today. She thinks it was her nerves

## 2020-05-29 RX ORDER — ERGOCALCIFEROL 1.25 MG/1
CAPSULE ORAL
Qty: 4 CAPSULE | Refills: 11 | Status: SHIPPED | OUTPATIENT
Start: 2020-05-29 | End: 2021-04-20

## 2020-05-29 NOTE — PROGRESS NOTES
Refill Routing Note     Medication(s) are not appropriate for processing by [facility]:    Medication Outside of Protocol    Appointments  past 12m or future 3m with PCP    Date Provider   Last Visit   3/5/2020 Nayely Garcia MD   Next Visit   Visit date not found Nayely Garcia MD           Automatic Epic Protocol Generated Data:    Requested Prescriptions   Pending Prescriptions Disp Refills    ergocalciferol (VITAMIN D2) 50,000 unit Cap [Pharmacy Med Name: Vitamin D2 1,250 mcg (50,000 unit) capsule] 4 capsule 11     Sig: TAKE ONE CAPSULE BY MOUTH EVERY 7 DAYS       Endocrinology:  Vitamins - Vitamin D Supplementation Failed - 5/28/2020 10:40 AM        Failed - Vitamin D is 20 or above and within 360 days     Vit D, 25-Hydroxy   Date Value Ref Range Status   11/14/2018 43 30 - 96 ng/mL Final     Comment:     Vitamin D deficiency.........<10 ng/mL                              Vitamin D insufficiency......10-29 ng/mL       Vitamin D sufficiency........> or equal to 30 ng/mL  Vitamin D toxicity............>100 ng/mL     03/20/2018 38 30 - 96 ng/mL Final     Comment:     Vitamin D deficiency.........<10 ng/mL                              Vitamin D insufficiency......10-29 ng/mL       Vitamin D sufficiency........> or equal to 30 ng/mL  Vitamin D toxicity............>100 ng/mL     12/22/2014 28 (L) 30 - 96 ng/mL Final     Comment:     Vitamin D deficiency.........<10 ng/mL                              Vitamin D insufficiency......10-29 ng/mL       Vitamin D sufficiency........> or equal to 30 ng/mL  Vitamin D toxicity............>100 ng/mL                Passed - Patient is at least 18 years old        Passed - Hypercalcemia is not present on problem list        Passed - Office visit in past 12 months or future 90 days.     Recent Outpatient Visits            2 months ago Hyperlipidemia, unspecified hyperlipidemia type    [facility] - Internal Medicine Nayely Garcia MD    7 months  ago Hypertension, essential    [facility] - Internal Medicine Shandra Day PA-C    7 months ago Encounter for preventive health examination    [facility] - Internal Medicine Laura Diaz NP    7 months ago Hypertension, essential    [facility] - Internal Medicine Shandra Day PA-C    8 months ago Hypertension, unspecified type    [facility] - Internal Medicine Nayely Garcia MD          Future Appointments              In 1 month MD [facility] - Gastroenterology, Kaitlin                Passed - Ca in normal range and within 360 days     Calcium   Date Value Ref Range Status   10/29/2019 9.5 8.7 - 10.5 mg/dL Final   08/23/2019 9.6 8.7 - 10.5 mg/dL Final   11/14/2018 10.0 8.7 - 10.5 mg/dL Final                 Note composed:5:16 AM 05/29/2020

## 2020-06-01 ENCOUNTER — TELEPHONE (OUTPATIENT)
Dept: GASTROENTEROLOGY | Facility: CLINIC | Age: 75
End: 2020-06-01

## 2020-06-01 NOTE — TELEPHONE ENCOUNTER
----- Message from Tracie Varela sent at 6/1/2020 10:39 AM CDT -----  Contact: self [phone]  Patient is calling to get a earlier appointment if possible. Please call

## 2020-06-04 ENCOUNTER — OFFICE VISIT (OUTPATIENT)
Dept: GASTROENTEROLOGY | Facility: CLINIC | Age: 75
End: 2020-06-04
Payer: MEDICARE

## 2020-06-04 VITALS — WEIGHT: 205 LBS | HEIGHT: 65 IN | BODY MASS INDEX: 34.16 KG/M2

## 2020-06-04 DIAGNOSIS — Z86.19 HISTORY OF HELICOBACTER PYLORI INFECTION: ICD-10-CM

## 2020-06-04 DIAGNOSIS — K21.9 GASTROESOPHAGEAL REFLUX DISEASE, ESOPHAGITIS PRESENCE NOT SPECIFIED: Primary | ICD-10-CM

## 2020-06-04 PROCEDURE — 99213 OFFICE O/P EST LOW 20 MIN: CPT | Mod: PBBFAC,PN | Performed by: INTERNAL MEDICINE

## 2020-06-04 PROCEDURE — 99204 PR OFFICE/OUTPT VISIT, NEW, LEVL IV, 45-59 MIN: ICD-10-PCS | Mod: S$PBB,,, | Performed by: INTERNAL MEDICINE

## 2020-06-04 PROCEDURE — 99204 OFFICE O/P NEW MOD 45 MIN: CPT | Mod: S$PBB,,, | Performed by: INTERNAL MEDICINE

## 2020-06-04 PROCEDURE — 99999 PR PBB SHADOW E&M-EST. PATIENT-LVL III: ICD-10-PCS | Mod: PBBFAC,,, | Performed by: INTERNAL MEDICINE

## 2020-06-04 PROCEDURE — 99999 PR PBB SHADOW E&M-EST. PATIENT-LVL III: CPT | Mod: PBBFAC,,, | Performed by: INTERNAL MEDICINE

## 2020-06-04 NOTE — PROGRESS NOTES
GASTROENTEROLOGY CLINIC NOTE    Reason for visit: The primary encounter diagnosis was Gastroesophageal reflux disease, esophagitis presence not specified. A diagnosis of History of Helicobacter pylori infection was also pertinent to this visit.  Referring provider/PCP: Nayely Garcia MD    HPI:  Caprice Gutierrez is a 74 y.o. female here today for nausea and vomiting and bloating. New patient.    She has had longstanding reflux for many years and has been on a PPI on and off for that time.  Recently over the past 2 months she has experienced upper GI upset including dyspepsia, nausea, belching, bloating, and sometimes cold sweats.  She did self induce vomiting for about a week at the beginning of all this and that did help her symptoms.  She states drinking carbonated beverages helps her to belch which helps her symptoms as well.  She has been taking over-the-counter Nexium b.i.d. and this seemingly helped her symptoms as well.  She is very anxious about the recent stressors going on in our Society including ongoing COVID crisis as well as racial injustices.  She does recall feeling similarly nauseaated with her HP infection back 10 years ago.  She is very fearful of catherine COVID.      Family hx of CRC    Prior Endoscopy:  EGD: 2010 [facility]  Chronic HP, treated and stool negative afterwards.    Colon:  2016 fish  3 polyps, one was TA, repeat 5 years    (Portions of this note were dictated using voice recognition software and may contain dictation related errors in spelling/grammar/syntax not found on text review)    Review of Systems   Constitutional: Negative for fever and weight loss.   HENT: Negative for nosebleeds and sore throat.    Eyes: Negative for double vision and photophobia.   Respiratory: Negative for cough and shortness of breath.    Cardiovascular: Negative for chest pain and leg swelling.   Gastrointestinal: Positive for heartburn, nausea and vomiting. Negative for abdominal pain and  blood in stool.   Genitourinary: Negative for dysuria and hematuria.   Musculoskeletal: Negative for joint pain and neck pain.   Skin: Negative for itching and rash.   Neurological: Negative for dizziness and headaches.   Psychiatric/Behavioral: Negative for hallucinations. The patient does not have insomnia.        Past Medical History: has a past medical history of Abnormal Pap smear, Allergy, Anemia, Degenerative arthritis of shoulder region, Diabetes mellitus type II, GERD (gastroesophageal reflux disease), Hyperlipidemia, Hypertension, Keloid cicatrix, and Obesity.    Past Surgical History: has a past surgical history that includes Umbilical hernia repair; Lipoma resection; CKC (2000); Wedge resection liver -hemangioma; Appendectomy (1980); Cholecystectomy (1980); Dilation and curettage of uterus (1983); Tubal ligation (1985); Cervical biopsy w/ loop electrode excision (1985); Colonoscopy (N/A, 7/12/2016); and Hernia repair.    Family History:family history includes Alcohol abuse in her brother; Arthritis in her sister; Cancer in her brother, mother, and sister; Cirrhosis in her brother; Colon cancer (age of onset: 69) in her sister; Diabetes in her brother, brother, and sister; Hypertension in her maternal aunt and sister; Stroke in her maternal aunt.    Allergies:   Review of patient's allergies indicates:   Allergen Reactions    Ace inhibitors Rash    Lisinopril Rash       Social History: reports that she has never smoked. She has never used smokeless tobacco. She reports that she does not drink alcohol or use drugs.    Home medications:   Current Outpatient Medications on File Prior to Visit   Medication Sig Dispense Refill    ALPRAZolam (XANAX) 1 MG tablet Half-1 tab po q day prn anxiety 30 tablet 2    aspirin (ECOTRIN) 81 MG EC tablet Take 81 mg by mouth once daily.      blood sugar diagnostic (CONTOUR TEST STRIPS) Strp TEST 1-3 TIMES DAILY AS DIRECTED 100 strip 3    carvedilol (COREG) 25 MG tablet  "TAKE ONE TABLET BY MOUTH TWICE DAILY 60 tablet 11    ergocalciferol (VITAMIN D2) 50,000 unit Cap TAKE ONE CAPSULE BY MOUTH EVERY 7 DAYS 4 capsule 11    esomeprazole (NEXIUM) 20 MG capsule Take 20 mg by mouth before breakfast.      hydroCHLOROthiazide (HYDRODIURIL) 25 MG tablet Take 1 tablet (25 mg total) by mouth once daily. 30 tablet 11    lancets (MICROLET LANCET) Misc 1 lancet by Misc.(Non-Drug; Combo Route) route 3 (three) times daily. 100 each 11    ondansetron (ZOFRAN) 8 MG tablet Take 1 tablet (8 mg total) by mouth every 12 (twelve) hours as needed for Nausea. 20 tablet 0    pyridoxine, vitamin B6, (VITAMIN B-6) 100 MG Tab Take 50 mg by mouth once daily.      omeprazole (PRILOSEC) 20 MG capsule Take 1 capsule (20 mg total) by mouth once daily. (Patient not taking: Reported on 6/4/2020) 90 capsule 3    pravastatin (PRAVACHOL) 40 MG tablet Take 1 tablet (40 mg total) by mouth once daily. 90 tablet 3     No current facility-administered medications on file prior to visit.        Vital signs:  Ht 5' 5" (1.651 m)   Wt 93 kg (205 lb)   BMI 34.11 kg/m²     Physical Exam   Constitutional: She is oriented to person, place, and time. No distress.   HENT:   Head: Normocephalic and atraumatic.   Eyes: Conjunctivae are normal. No scleral icterus.   Neck: Neck supple. No thyromegaly present.   Cardiovascular: Normal rate, normal heart sounds and intact distal pulses.   Pulmonary/Chest: Effort normal and breath sounds normal. No stridor. No respiratory distress.   Abdominal: Soft. She exhibits no distension and no mass. There is no tenderness. There is no rebound and no guarding.   Musculoskeletal: She exhibits no tenderness or deformity.   Neurological: She is alert and oriented to person, place, and time. Gait normal.   Skin: Skin is warm and dry. No rash noted. She is not diaphoretic.   Psychiatric: She has a normal mood and affect. Her behavior is normal.   Vitals reviewed.      Routine labs:  Lab Results "   Component Value Date    WBC 7.52 08/23/2019    HGB 12.1 08/23/2019    HCT 36.0 (L) 08/23/2019    MCV 84 08/23/2019     08/23/2019     No results found for: INR  Lab Results   Component Value Date    IRON 53 03/20/2018    FERRITIN 134 03/20/2018    TIBC 334 03/20/2018    FESATURATED 16 (L) 03/20/2018     Lab Results   Component Value Date     10/29/2019    K 3.6 10/29/2019     10/29/2019    CO2 30 (H) 10/29/2019    BUN 13 10/29/2019    CREATININE 1.0 10/29/2019    GLUF 108 08/20/2004     Lab Results   Component Value Date    ALBUMIN 4.0 08/23/2019    ALT 16 09/03/2019    AST 28 09/03/2019    ALKPHOS 82 08/23/2019    BILITOT 0.4 08/23/2019     No results found for: GLUCOSE    I have reviewed prior labs, imaging, notes from last month      Assessment:  1. Gastroesophageal reflux disease, esophagitis presence not specified    2. History of Helicobacter pylori infection      Suspect symptoms related to gerd, but also anxieties could be contributed.  She requests re-testing for active HP, will check stool but will need to come off the PPI x 2 weeks before collecting stool for HP.  Otherwise continue nexium.    Plan:  Orders Placed This Encounter    H. pylori antigen, stool   EGD to be scheduled    She wishes to schedule EGD in near future but will contact us when she wants to schedule      RTC prn      Ihsan Goodson MD  [facility]

## 2020-06-04 NOTE — LETTER
June 4, 2020      Nayely Garcia MD  [address]  [place] [address]           [facility] - Gastroenterology  [facility] [address]  [place] [address]  Phone: [phone]  Fax: [phone]          Patient: Caprice Gutierrez   MR Number: [id number]   YOB: 1945   Date of Visit: 6/4/2020       Dear Dr. Nayely Garcia:    Thank you for referring Caprice Gutierrez to me for evaluation. Attached you will find relevant portions of my assessment and plan of care.    If you have questions, please do not hesitate to call me. I look forward to following Caprice Gutierrez along with you.    Sincerely,    Ihsan Goodson MD    Enclosure  CC:  No Recipients    If you would like to receive this communication electronically, please contact [email or web address] or [phone] to request more information on [employer] Link access.    For providers and/or their staff who would like to refer a patient to [facility], please contact us through our one-stop-shop provider referral line, [facility], at [phone].    If you feel you have received this communication in error or would no longer like to receive these types of communications, please e-mail [email or web address]

## 2020-06-09 ENCOUNTER — TELEPHONE (OUTPATIENT)
Dept: INTERNAL MEDICINE | Facility: CLINIC | Age: 75
End: 2020-06-09

## 2020-06-09 RX ORDER — LOSARTAN POTASSIUM 50 MG/1
50 TABLET ORAL DAILY
Qty: 30 TABLET | Refills: 5 | Status: SHIPPED | OUTPATIENT
Start: 2020-06-09 | End: 2020-08-11

## 2020-06-09 NOTE — TELEPHONE ENCOUNTER
pls call - it is very difficult for me to make therapeutic decisions without seeing her.   SHe wants to stop carvedilol and hctz?  OK.    Restart losartan 50 mg daily   BP check with me in a month or so  pls schedule the labs I ordered in march- cmp, cbc, lipids, a1c.

## 2020-06-09 NOTE — TELEPHONE ENCOUNTER
Pt states that she does not want to come out right now due to traffic and does not want to see a PA. So she wants to know if you can change her blood pressure pill

## 2020-06-09 NOTE — TELEPHONE ENCOUNTER
----- Message from Saige Chen sent at 6/9/2020  7:44 AM CDT -----  Contact: self/ [phone]  Patient would like to discuss coming off of carvedilol (COREG) 25 MG tablet and the fluid pill and putting her back on losartan even if it is a lower dose. Patient is weak and would like some blood work ordered, please call and advise.

## 2020-06-11 ENCOUNTER — OFFICE VISIT (OUTPATIENT)
Dept: INTERNAL MEDICINE | Facility: CLINIC | Age: 75
End: 2020-06-11
Payer: MEDICARE

## 2020-06-11 ENCOUNTER — TELEPHONE (OUTPATIENT)
Dept: INTERNAL MEDICINE | Facility: CLINIC | Age: 75
End: 2020-06-11

## 2020-06-11 VITALS
BODY MASS INDEX: 33.16 KG/M2 | HEIGHT: 65 IN | HEART RATE: 73 BPM | DIASTOLIC BLOOD PRESSURE: 90 MMHG | SYSTOLIC BLOOD PRESSURE: 150 MMHG | OXYGEN SATURATION: 98 % | WEIGHT: 199.06 LBS

## 2020-06-11 DIAGNOSIS — I10 HYPERTENSION, ESSENTIAL: ICD-10-CM

## 2020-06-11 DIAGNOSIS — I10 ESSENTIAL HYPERTENSION: ICD-10-CM

## 2020-06-11 DIAGNOSIS — F41.9 ANXIETY: Primary | ICD-10-CM

## 2020-06-11 DIAGNOSIS — R53.83 FATIGUE, UNSPECIFIED TYPE: ICD-10-CM

## 2020-06-11 DIAGNOSIS — F32.A DEPRESSION, UNSPECIFIED DEPRESSION TYPE: ICD-10-CM

## 2020-06-11 PROCEDURE — 93010 ELECTROCARDIOGRAM REPORT: CPT | Mod: S$PBB,,, | Performed by: INTERNAL MEDICINE

## 2020-06-11 PROCEDURE — 99214 OFFICE O/P EST MOD 30 MIN: CPT | Mod: PBBFAC,25 | Performed by: NURSE PRACTITIONER

## 2020-06-11 PROCEDURE — 99999 PR PBB SHADOW E&M-EST. PATIENT-LVL IV: CPT | Mod: PBBFAC,,, | Performed by: NURSE PRACTITIONER

## 2020-06-11 PROCEDURE — 99999 PR PBB SHADOW E&M-EST. PATIENT-LVL IV: ICD-10-PCS | Mod: PBBFAC,,, | Performed by: NURSE PRACTITIONER

## 2020-06-11 PROCEDURE — 93010 EKG 12-LEAD: ICD-10-PCS | Mod: S$PBB,,, | Performed by: INTERNAL MEDICINE

## 2020-06-11 PROCEDURE — 93005 ELECTROCARDIOGRAM TRACING: CPT | Mod: PBBFAC | Performed by: INTERNAL MEDICINE

## 2020-06-11 PROCEDURE — 99213 OFFICE O/P EST LOW 20 MIN: CPT | Mod: S$PBB,,, | Performed by: NURSE PRACTITIONER

## 2020-06-11 PROCEDURE — 99213 PR OFFICE/OUTPT VISIT, EST, LEVL III, 20-29 MIN: ICD-10-PCS | Mod: S$PBB,,, | Performed by: NURSE PRACTITIONER

## 2020-06-11 RX ORDER — SERTRALINE HYDROCHLORIDE 25 MG/1
25 TABLET, FILM COATED ORAL DAILY
Qty: 30 TABLET | Refills: 11 | Status: SHIPPED | OUTPATIENT
Start: 2020-06-11 | End: 2020-09-08 | Stop reason: SDUPTHER

## 2020-06-11 RX ORDER — HYDROCHLOROTHIAZIDE 12.5 MG/1
12.5 TABLET ORAL DAILY
Qty: 30 TABLET | Refills: 11
Start: 2020-06-11 | End: 2020-08-11

## 2020-06-11 NOTE — Clinical Note
Dr Garcia, I saw Ms. Gutierrez in clinic today. Her cc was fatigue but I believe this is related to anxiety and depression. She was tearful and agitated at visit. She was receptive to starting zoloft but wanted to discuss it with you. -Mariana

## 2020-06-11 NOTE — TELEPHONE ENCOUNTER
----- Message from Marek Gonzalez sent at 6/11/2020  7:30 AM CDT -----  Contact: Patient [phone]  Patient would like to get Appt  advice.      Comments: Patient calling would like for office to call back, regarding an appt that is needed, stating declined an appt but would like to schedule now, call, has additional question. Would like to be seen any time today.    Please call an advise  Thank you

## 2020-06-11 NOTE — PROGRESS NOTES
INTERNAL MEDICINE URGENT CARE NOTE    CHIEF COMPLAINT     Chief Complaint   Patient presents with    Fatigue    Anxiety       HPI     Caprice Gutierrez is a 74 y.o. female with vertigo, hld, it d def, per-dm, and gerd who presents for an urgent visit today.    HTN- was prescribed coreg 25 BID and HCTZ 12.5mg  - at 3/5/2020 visit BP elevated and ankles swollen - Dr Garcia increased HCTZ. Pt did not schedule follow up for BP check   Pt called office 2 days ago stating she felt weak and wanted to stop HCTZ and coreg therefore Dr Garcia stopped those and started losartan 50mg     Today she c/o weakness. Describes as lacking energy at night and early morning.   Describes feeling of fear coming on her.   C/o wakes up feeling shakey, weakness, sleepless since early March.     GERD- began having reflux, nausea and episodic vomiting.   Seen by GI this week. Recommended to stop PPI and return stool sample. States she feels better after stopping PPI     Past Medical History:  Past Medical History:   Diagnosis Date    Abnormal Pap smear 1985, 2000    LEEP, CKC    Allergy     Anemia     Degenerative arthritis of shoulder region     Diabetes mellitus type II     GERD (gastroesophageal reflux disease)     Hyperlipidemia     Hypertension     Keloid cicatrix     Obesity        Home Medications:  Prior to Admission medications    Medication Sig Start Date End Date Taking? Authorizing Provider   ALPRAZolam (XANAX) 1 MG tablet Half-1 tab po q day prn anxiety 3/5/20   Nayely Garcia MD   aspirin (ECOTRIN) 81 MG EC tablet Take 81 mg by mouth once daily.    Historical Provider, MD   blood sugar diagnostic (CONTOUR TEST STRIPS) Strp TEST 1-3 TIMES DAILY AS DIRECTED 4/15/19   Nayely Garcia MD   ergocalciferol (VITAMIN D2) 50,000 unit Cap TAKE ONE CAPSULE BY MOUTH EVERY 7 DAYS 5/29/20   Nayely Garcia MD   esomeprazole (NEXIUM) 20 MG capsule Take 20 mg by mouth before breakfast.    Historical Provider, MD   lancets  (MICROLET LANCET) Misc 1 lancet by Misc.(Non-Drug; Combo Route) route 3 (three) times daily. 11/11/14   Nayely Garcia MD   losartan (COZAAR) 50 MG tablet Take 1 tablet (50 mg total) by mouth once daily. 6/9/20 7/9/20  Nayely Garcia MD   omeprazole (PRILOSEC) 20 MG capsule Take 1 capsule (20 mg total) by mouth once daily.  Patient not taking: Reported on 6/4/2020 4/15/19   Nayely Garcia MD   ondansetron (ZOFRAN) 8 MG tablet Take 1 tablet (8 mg total) by mouth every 12 (twelve) hours as needed for Nausea. 3/19/20   Nayely Garcia MD   pravastatin (PRAVACHOL) 40 MG tablet Take 1 tablet (40 mg total) by mouth once daily. 7/15/19 3/5/20  Nayely Garcia MD   pyridoxine, vitamin B6, (VITAMIN B-6) 100 MG Tab Take 50 mg by mouth once daily.    Historical Provider, MD       Review of Systems:  Review of Systems   Constitutional: Positive for fatigue. Negative for chills, fever and unexpected weight change.   HENT: Negative for congestion, hearing loss, rhinorrhea and sinus pressure.    Eyes: Negative for pain, redness and visual disturbance.   Respiratory: Negative for cough and shortness of breath.    Cardiovascular: Negative for chest pain and palpitations.   Gastrointestinal: Negative for abdominal distention, abdominal pain, constipation, diarrhea, nausea and vomiting.   Endocrine: Negative for polydipsia, polyphagia and polyuria.   Genitourinary: Negative for dysuria, frequency, urgency and vaginal discharge.   Musculoskeletal: Negative for arthralgias, gait problem and myalgias.   Skin: Negative for color change and rash.   Allergic/Immunologic: Negative for environmental allergies and immunocompromised state.   Neurological: Negative for dizziness, weakness, light-headedness and headaches.   Hematological: Negative for adenopathy. Does not bruise/bleed easily.   Psychiatric/Behavioral: Positive for decreased concentration, dysphoric mood and sleep disturbance. Negative for confusion. The patient is  "nervous/anxious.        Health Maintainence:   Immunizations:  Health Maintenance       Date Due Completion Date    High Dose Statin 09/18/1966 ---    Eye Exam 08/28/2018 8/28/2017 (ClinicallyNA)    Override on 8/28/2017: Not Clinically Appropriate (pre diabetes. not indicated)    DEXA SCAN 07/28/2019 7/28/2014    Override on 10/26/2005: Done    Foot Exam 12/18/2019 12/18/2018    Override on 8/28/2017: Done    Hemoglobin A1c 03/03/2020 9/3/2019    Mammogram 07/15/2020 7/15/2019    TETANUS VACCINE 10/29/2020 (Originally 9/18/1963) ---    Shingles Vaccine (1 of 2) 10/29/2020 (Originally 9/18/1995) ---    Influenza Vaccine (Season Ended) 09/01/2020 12/17/2013 (Declined)    Override on 12/17/2013: Declined    Lipid Panel 09/03/2020 9/3/2019    Colonoscopy 07/12/2021 7/12/2016           PHYSICAL EXAM     Ht 5' 5" (1.651 m)   BMI 34.11 kg/m²     Physical Exam   Constitutional: She is oriented to person, place, and time. She appears well-developed and well-nourished.   HENT:   Head: Normocephalic.   Right Ear: External ear normal.   Left Ear: External ear normal.   Nose: Nose normal.   Mouth/Throat: Oropharynx is clear and moist. No oropharyngeal exudate.   Eyes: Pupils are equal, round, and reactive to light.   Neck: Neck supple. No JVD present. No tracheal deviation present. No thyromegaly present.   Cardiovascular: Normal rate, regular rhythm, normal heart sounds and intact distal pulses. Exam reveals no gallop and no friction rub.   No murmur heard.  Pulmonary/Chest: Effort normal and breath sounds normal. No respiratory distress. She has no wheezes. She has no rales.   Abdominal: Soft. Bowel sounds are normal. She exhibits no distension. There is no tenderness.   Musculoskeletal: Normal range of motion. She exhibits no edema or tenderness.   Lymphadenopathy:     She has no cervical adenopathy.   Neurological: She is alert and oriented to person, place, and time.   Skin: Skin is warm and dry. No rash noted. "   Psychiatric: She has a normal mood and affect. Her behavior is normal.   Vitals reviewed.      LABS     Lab Results   Component Value Date    HGBA1C 5.9 (H) 09/03/2019     CMP  Sodium   Date Value Ref Range Status   10/29/2019 140 136 - 145 mmol/L Final     Potassium   Date Value Ref Range Status   10/29/2019 3.6 3.5 - 5.1 mmol/L Final     Chloride   Date Value Ref Range Status   10/29/2019 100 95 - 110 mmol/L Final     CO2   Date Value Ref Range Status   10/29/2019 30 (H) 23 - 29 mmol/L Final     Glucose   Date Value Ref Range Status   10/29/2019 101 70 - 110 mg/dL Final     BUN, Bld   Date Value Ref Range Status   10/29/2019 13 8 - 23 mg/dL Final     Creatinine   Date Value Ref Range Status   10/29/2019 1.0 0.5 - 1.4 mg/dL Final     Calcium   Date Value Ref Range Status   10/29/2019 9.5 8.7 - 10.5 mg/dL Final     Total Protein   Date Value Ref Range Status   08/23/2019 7.5 6.0 - 8.4 g/dL Final     Albumin   Date Value Ref Range Status   08/23/2019 4.0 3.5 - 5.2 g/dL Final     Total Bilirubin   Date Value Ref Range Status   08/23/2019 0.4 0.1 - 1.0 mg/dL Final     Comment:     For infants and newborns, interpretation of results should be based  on gestational age, weight and in agreement with clinical  observations.  Premature Infant recommended reference ranges:  Up to 24 hours.............<8.0 mg/dL  Up to 48 hours............<12.0 mg/dL  3-5 days..................<15.0 mg/dL  6-29 days.................<15.0 mg/dL       Alkaline Phosphatase   Date Value Ref Range Status   08/23/2019 82 38 - 126 U/L Final     AST   Date Value Ref Range Status   09/03/2019 28 15 - 46 U/L Final     ALT   Date Value Ref Range Status   09/03/2019 16 10 - 44 U/L Final     Anion Gap   Date Value Ref Range Status   10/29/2019 10 8 - 16 mmol/L Final     eGFR if    Date Value Ref Range Status   10/29/2019 >60 >60 mL/min/1.73 m^2 Final     eGFR if non    Date Value Ref Range Status   10/29/2019 56 (A) >60  mL/min/1.73 m^2 Final     Comment:     Calculation used to obtain the estimated glomerular filtration  rate (eGFR) is the CKD-EPI equation.        Lab Results   Component Value Date    WBC 7.52 08/23/2019    HGB 12.1 08/23/2019    HCT 36.0 (L) 08/23/2019    MCV 84 08/23/2019     08/23/2019     Lab Results   Component Value Date    CHOL 194 09/03/2019    CHOL 237 (H) 11/14/2018    CHOL 203 (H) 02/21/2018     Lab Results   Component Value Date    HDL 67 09/03/2019    HDL 71 11/14/2018    HDL 65 02/21/2018     Lab Results   Component Value Date    LDLCALC 114.8 09/03/2019    LDLCALC 146.0 11/14/2018    LDLCALC 123.8 02/21/2018     Lab Results   Component Value Date    TRIG 61 09/03/2019    TRIG 100 11/14/2018    TRIG 71 02/21/2018     Lab Results   Component Value Date    CHOLHDL 34.5 09/03/2019    CHOLHDL 30.0 11/14/2018    CHOLHDL 32.0 02/21/2018     Lab Results   Component Value Date    TSH 0.460 05/02/2011       ASSESSMENT/PLAN     Caprice Gutierrez is a 74 y.o. female     Anxiety with depression- will cont xanax as needed until zoloft therapeutic. Virtual or audio visit in 1-2 weeks.   -     sertraline (ZOLOFT) 25 MG tablet; Take 1 tablet (25 mg total) by mouth once daily.  Dispense: 30 tablet; Refill: 11    Depression, unspecified depression type  -     sertraline (ZOLOFT) 25 MG tablet; Take 1 tablet (25 mg total) by mouth once daily.  Dispense: 30 tablet; Refill: 11    Fatigue, unspecified type- EKG in office. Send for labs.   -     IN OFFICE EKG 12-LEAD (to Muse)  -     Brain Natriuretic Peptide; Future; Expected date: 06/11/2020  -     TSH; Future; Expected date: 06/11/2020    Hypertension, essential- poorly controlled off meds. Will cont losartan 50mg and hctz 12.5.   -     hydroCHLOROthiazide (HYDRODIURIL) 12.5 MG Tab; Take 1 tablet (12.5 mg total) by mouth once daily.  Dispense: 30 tablet; Refill: 11    Essential hypertension    Follow up with PCP in 1 week     Patient education provided from  [facility]. Patient was counseled on when and how to seek emergent care.       Mariana DAVIS, TRUDI, FNP-c   Department of Internal Medicine - [facility]  1:05 PM

## 2020-06-12 ENCOUNTER — LAB VISIT (OUTPATIENT)
Dept: LAB | Facility: HOSPITAL | Age: 75
End: 2020-06-12
Attending: INTERNAL MEDICINE
Payer: MEDICARE

## 2020-06-12 DIAGNOSIS — E78.5 HYPERLIPIDEMIA, UNSPECIFIED HYPERLIPIDEMIA TYPE: ICD-10-CM

## 2020-06-12 DIAGNOSIS — I10 HYPERTENSION, ESSENTIAL: ICD-10-CM

## 2020-06-12 DIAGNOSIS — R53.83 FATIGUE, UNSPECIFIED TYPE: ICD-10-CM

## 2020-06-12 DIAGNOSIS — R73.03 PRE-DIABETES: ICD-10-CM

## 2020-06-12 LAB
ALBUMIN SERPL BCP-MCNC: 4.6 G/DL (ref 3.5–5.2)
ALP SERPL-CCNC: 95 U/L (ref 38–126)
ALT SERPL W/O P-5'-P-CCNC: 18 U/L (ref 10–44)
ANION GAP SERPL CALC-SCNC: 10 MMOL/L (ref 8–16)
AST SERPL-CCNC: 26 U/L (ref 15–46)
BASOPHILS # BLD AUTO: 0.02 K/UL (ref 0–0.2)
BASOPHILS NFR BLD: 0.3 % (ref 0–1.9)
BILIRUB SERPL-MCNC: 0.7 MG/DL (ref 0.1–1)
BUN SERPL-MCNC: 12 MG/DL (ref 7–17)
CALCIUM SERPL-MCNC: 10.1 MG/DL (ref 8.7–10.5)
CHLORIDE SERPL-SCNC: 98 MMOL/L (ref 95–110)
CHOLEST SERPL-MCNC: 227 MG/DL (ref 120–199)
CHOLEST/HDLC SERPL: 2.9 {RATIO} (ref 2–5)
CO2 SERPL-SCNC: 30 MMOL/L (ref 23–29)
CREAT SERPL-MCNC: 0.92 MG/DL (ref 0.5–1.4)
DIFFERENTIAL METHOD: ABNORMAL
EOSINOPHIL # BLD AUTO: 0.1 K/UL (ref 0–0.5)
EOSINOPHIL NFR BLD: 1.6 % (ref 0–8)
ERYTHROCYTE [DISTWIDTH] IN BLOOD BY AUTOMATED COUNT: 16 % (ref 11.5–14.5)
EST. GFR  (AFRICAN AMERICAN): >60 ML/MIN/1.73 M^2
EST. GFR  (NON AFRICAN AMERICAN): >60 ML/MIN/1.73 M^2
ESTIMATED AVG GLUCOSE: 120 MG/DL (ref 68–131)
GLUCOSE SERPL-MCNC: 123 MG/DL (ref 70–110)
HBA1C MFR BLD HPLC: 5.8 % (ref 4–5.6)
HCT VFR BLD AUTO: 42.9 % (ref 37–48.5)
HDLC SERPL-MCNC: 79 MG/DL (ref 40–75)
HDLC SERPL: 34.8 % (ref 20–50)
HGB BLD-MCNC: 14.2 G/DL (ref 12–16)
IMM GRANULOCYTES # BLD AUTO: 0.01 K/UL (ref 0–0.04)
IMM GRANULOCYTES NFR BLD AUTO: 0.1 % (ref 0–0.5)
LDLC SERPL CALC-MCNC: 134.6 MG/DL (ref 63–159)
LYMPHOCYTES # BLD AUTO: 2.2 K/UL (ref 1–4.8)
LYMPHOCYTES NFR BLD: 32.3 % (ref 18–48)
MCH RBC QN AUTO: 28.3 PG (ref 27–31)
MCHC RBC AUTO-ENTMCNC: 33.1 G/DL (ref 32–36)
MCV RBC AUTO: 86 FL (ref 82–98)
MONOCYTES # BLD AUTO: 0.7 K/UL (ref 0.3–1)
MONOCYTES NFR BLD: 11.1 % (ref 4–15)
NEUTROPHILS # BLD AUTO: 3.6 K/UL (ref 1.8–7.7)
NEUTROPHILS NFR BLD: 54.6 % (ref 38–73)
NONHDLC SERPL-MCNC: 148 MG/DL
NRBC BLD-RTO: 0 /100 WBC
NT-PROBNP: 57 PG/ML (ref 5–900)
PLATELET # BLD AUTO: 340 K/UL (ref 150–350)
PMV BLD AUTO: 10.2 FL (ref 9.2–12.9)
POTASSIUM SERPL-SCNC: 4 MMOL/L (ref 3.5–5.1)
PROT SERPL-MCNC: 8.6 G/DL (ref 6–8.4)
RBC # BLD AUTO: 5.01 M/UL (ref 4–5.4)
SODIUM SERPL-SCNC: 138 MMOL/L (ref 136–145)
TRIGL SERPL-MCNC: 67 MG/DL (ref 30–150)
TSH SERPL DL<=0.005 MIU/L-ACNC: 1.61 UIU/ML (ref 0.4–4)
WBC # BLD AUTO: 6.68 K/UL (ref 3.9–12.7)

## 2020-06-12 PROCEDURE — 36415 COLL VENOUS BLD VENIPUNCTURE: CPT | Mod: PO

## 2020-06-12 PROCEDURE — 80061 LIPID PANEL: CPT

## 2020-06-12 PROCEDURE — 83880 ASSAY OF NATRIURETIC PEPTIDE: CPT | Mod: PO

## 2020-06-12 PROCEDURE — 85025 COMPLETE CBC W/AUTO DIFF WBC: CPT | Mod: PO

## 2020-06-12 PROCEDURE — 83036 HEMOGLOBIN GLYCOSYLATED A1C: CPT

## 2020-06-12 PROCEDURE — 84443 ASSAY THYROID STIM HORMONE: CPT | Mod: PO

## 2020-06-12 PROCEDURE — 80053 COMPREHEN METABOLIC PANEL: CPT | Mod: PO

## 2020-06-15 ENCOUNTER — TELEPHONE (OUTPATIENT)
Dept: INTERNAL MEDICINE | Facility: CLINIC | Age: 75
End: 2020-06-15

## 2020-06-15 NOTE — TELEPHONE ENCOUNTER
Spoke with pt, notified of results. Pt says in the evenings she feels fine but the mornings are really hard for her. Pt says she wakes up early and turns on the news. I did advised to avoid watching the news if possible. She was prescribed zoloft at appt with kacey, pt says she does not want to take it.    Pt says Saturday after taking losartan 50mg she got really weak and light headed. Pt says her BP was running low sat and Sunday. Pt is not taking HCTZ  Reading after medication  Sat 8am 100/69 P 97   Sat 11am 107/72 p 89  Junior 8am 104/73  Sunday 12pm 127/83  She would like to know if she can take half of losartan.     Offered appt, since opt has several questions/ concerns, she refused.

## 2020-06-15 NOTE — TELEPHONE ENCOUNTER
Yes, ok to take half of losartan dose daily.  Keep close eye on BP, as BP was high when she saw Mariana.    Also, I reviewed Mariana's note and I do encourage her to try sertraline.  It is a low dose and may really help anxiety.

## 2020-06-16 ENCOUNTER — TELEPHONE (OUTPATIENT)
Dept: INTERNAL MEDICINE | Facility: CLINIC | Age: 75
End: 2020-06-16

## 2020-06-16 RX ORDER — CARVEDILOL 12.5 MG/1
12.5 TABLET ORAL 2 TIMES DAILY WITH MEALS
Qty: 60 TABLET | Refills: 11 | Status: SHIPPED | OUTPATIENT
Start: 2020-06-16 | End: 2020-07-17 | Stop reason: DRUGHIGH

## 2020-06-16 NOTE — TELEPHONE ENCOUNTER
Dr. Garcia I called to inform her you were aware of her symptoms.  She was unable to commit to a time so far out she states she will call back for an appt, perhaps your staff can place a reminder to call her back in 3-4 weeks to see if she can commit to day and time.  Thank you.  She already picked up the Coreg rx.

## 2020-06-16 NOTE — TELEPHONE ENCOUNTER
----- Message from Val Guzman sent at 6/16/2020  7:52 AM CDT -----  Regarding: Message  Contact: self  [phone]  The  losartan  is not working her her ant it makes her weak and she has anxieties. Please allow her to continue on thecarvedilol (COREG) 12.5 MG tablet. Please call this in for her to.    [facility] [phone] (Phone)  [phone] (Fax)

## 2020-07-17 ENCOUNTER — PATIENT OUTREACH (OUTPATIENT)
Dept: ADMINISTRATIVE | Facility: OTHER | Age: 75
End: 2020-07-17

## 2020-07-17 ENCOUNTER — OFFICE VISIT (OUTPATIENT)
Dept: OPHTHALMOLOGY | Facility: CLINIC | Age: 75
End: 2020-07-17
Payer: MEDICARE

## 2020-07-17 VITALS — HEART RATE: 62 BPM | SYSTOLIC BLOOD PRESSURE: 174 MMHG | DIASTOLIC BLOOD PRESSURE: 90 MMHG

## 2020-07-17 DIAGNOSIS — H43.813 PVD (POSTERIOR VITREOUS DETACHMENT), BOTH EYES: ICD-10-CM

## 2020-07-17 DIAGNOSIS — Z71.89 COMPLEX CARE COORDINATION: ICD-10-CM

## 2020-07-17 DIAGNOSIS — Z12.31 ENCOUNTER FOR SCREENING MAMMOGRAM FOR MALIGNANT NEOPLASM OF BREAST: Primary | ICD-10-CM

## 2020-07-17 DIAGNOSIS — H33.321 RETINAL HOLE, RIGHT: Primary | ICD-10-CM

## 2020-07-17 PROCEDURE — 92201 PR OPHTHALMOSCOPY, EXT, W/RET DRAW/SCLERAL DEPR, I&R, UNI/BI: ICD-10-PCS | Mod: S$PBB,,, | Performed by: OPHTHALMOLOGY

## 2020-07-17 PROCEDURE — 92201 OPSCPY EXTND RTA DRAW UNI/BI: CPT | Mod: S$PBB,,, | Performed by: OPHTHALMOLOGY

## 2020-07-17 PROCEDURE — 99214 OFFICE O/P EST MOD 30 MIN: CPT | Mod: PBBFAC | Performed by: OPHTHALMOLOGY

## 2020-07-17 PROCEDURE — 92004 PR EYE EXAM, NEW PATIENT,COMPREHESV: ICD-10-PCS | Mod: S$PBB,,, | Performed by: OPHTHALMOLOGY

## 2020-07-17 PROCEDURE — 99999 PR PBB SHADOW E&M-EST. PATIENT-LVL IV: CPT | Mod: PBBFAC,,, | Performed by: OPHTHALMOLOGY

## 2020-07-17 PROCEDURE — 92004 COMPRE OPH EXAM NEW PT 1/>: CPT | Mod: S$PBB,,, | Performed by: OPHTHALMOLOGY

## 2020-07-17 PROCEDURE — 99999 PR PBB SHADOW E&M-EST. PATIENT-LVL IV: ICD-10-PCS | Mod: PBBFAC,,, | Performed by: OPHTHALMOLOGY

## 2020-07-17 RX ORDER — CARVEDILOL 25 MG/1
25 TABLET ORAL DAILY
COMMUNITY
Start: 2020-06-16 | End: 2020-08-11

## 2020-07-17 NOTE — PROGRESS NOTES
Subjective:       Patient ID: Caprice Gutierrez is a 74 y.o. female      Chief Complaint   Patient presents with    Retinal Eval OU     History of Present Illness  HPI     73 YO F here today per referral by Dr. Nayely Garcia MD for Retina Eval   OU.  Second opinion    CC: Pt was seen by her eye doctor and her referred for floaters OD and   retina tear OD. stj     Pt saw her primary eye doctor June 22 for sensitivity to bright light.    Told had dry eye and all ok.  July 4 had floaters.  Went to same eye doctor July 8 and told had retinal   tears.  Offered laser and pt refused because was nervous about procedure   and wanted to have something done for floaters.  Here for second opinion    -eye pain  +floaters OD persisting  -flashes  +blurred vision OD  Va OS clear/good    Last edited by Eamon Azul MD on 7/17/2020  9:32 AM. (History)        Imaging:    OCT WNL OU.  Documentation only    Assessment/Plan:     1. Retinal hole, right  Operculated with pigment surrounding.  Appears old.  Predates symptoms  Discussed option of retinopexy vs obs.  Pt elects obs    Pathology of PVD, Retinal Tear, Retinal Detachment reviewed in great detail  RD precautions discussed in detail, patient expressed understanding  RTC immediately PRN (especially ANY change flashes, floaters, vision, visual field)      2. PVD (posterior vitreous detachment), both eyes  Symptomatic OD for about 2 wks  Observe    Follow up in about 1 month (around 8/17/2020), or if symptoms worsen or fail to improve, for Dilated examination.

## 2020-07-17 NOTE — PROGRESS NOTES
Requested updates within Care Everywhere.  Patient's chart was reviewed for overdue SHANTANU topics.  Immunizations reconciled.    Orders placed:Mammogram  Tasked appts:n/a  Labs Linked:n/a

## 2020-08-06 ENCOUNTER — TELEPHONE (OUTPATIENT)
Dept: INTERNAL MEDICINE | Facility: CLINIC | Age: 75
End: 2020-08-06

## 2020-08-06 NOTE — TELEPHONE ENCOUNTER
----- Message from Caitlyn Mei sent at 8/6/2020 10:41 AM CDT -----  Contact: [phone]  Caller is requesting to schedule their annual screening mammogram appointment. Order is not listed in Epic.  Please enter order and contact patient to schedule.  Where would they like the mammogram performed?:  [facility]   Would the patient rather receive a phone call back or a response via [facility]?:  Call back  Additional information:  Patient is requesting a call back from the nurse for advice on a RX reaction. Please call and advise

## 2020-08-07 ENCOUNTER — TELEPHONE (OUTPATIENT)
Dept: INTERNAL MEDICINE | Facility: CLINIC | Age: 75
End: 2020-08-07

## 2020-08-07 NOTE — PROGRESS NOTES
Refill Routing Note   Medication(s) are not appropriate for processing by [facility]:       - Outside of protocol           Medication reconciliation completed: No      Automatic Epic Generated Protocol Data:        Requested Prescriptions   Pending Prescriptions Disp Refills    ALPRAZolam (XANAX) 1 MG tablet [Pharmacy Med Name: alprazolam 1 mg tablet] 30 tablet 2     Sig: TAKE ONE-HALF - ONE TABLET BY MOUTH DAILY FOR ANXIETY       Off-Protocol Failed - 8/7/2020 11:48 AM        Failed - Medication not assigned to a protocol, review manually.        Passed - Office visit in past 12 months or future 90 days.     Recent Outpatient Visits            3 weeks ago Retinal hole, right    [facility] - Ophthalmology Eamon Azul MD    1 month ago Anxiety    [facility] - Internal Medicine Mariana Pepper NP    2 months ago Gastroesophageal reflux disease, esophagitis presence not specified    [place] - Gastroenterology Ihsan Goodson MD    5 months ago Hyperlipidemia, unspecified hyperlipidemia type    [facility] - Internal Medicine Nayely Garcia MD    9 months ago Hypertension, essential    [facility] - Internal Medicine Shandra Day PA-C          Future Appointments              In 1 week [facility] - Ophthalmology, [facility]                      Appointments  past 12m or future 3m with PCP    Date Provider   Last Visit   3/5/2020 Nayely Garcia MD   Next Visit   8/7/2020 Nayely Garcia MD   ED visits in past 90 days: 0     Note composed:3:47 PM 08/07/2020

## 2020-08-07 NOTE — TELEPHONE ENCOUNTER
----- Message from Saige Chen sent at 8/7/2020 10:42 AM CDT -----  Contact: self/ [phone]  Patient is returning a phone call.  Who left a message for the patient: Berta Urena MA  Does patient know what this is regarding:    Comments:

## 2020-08-07 NOTE — TELEPHONE ENCOUNTER
----- Message from Caroline Pham sent at 8/7/2020  7:47 AM CDT -----  Contact: self   Patient is returning a phone call.  Who left a message for the patient: Lorraine Ma MA  Does patient know what this is regarding:  mammogram  Comments:

## 2020-08-09 RX ORDER — ALPRAZOLAM 1 MG/1
TABLET ORAL
Qty: 30 TABLET | Refills: 2 | Status: SHIPPED | OUTPATIENT
Start: 2020-08-09 | End: 2020-10-23

## 2020-08-10 ENCOUNTER — TELEPHONE (OUTPATIENT)
Dept: INTERNAL MEDICINE | Facility: CLINIC | Age: 75
End: 2020-08-10

## 2020-08-10 NOTE — TELEPHONE ENCOUNTER
----- Message from Marek Gonzalez sent at 8/10/2020  7:38 AM CDT -----  Regarding: Appt  Contact: Patient [phone]  Sooner appointment than the  can schedule.  Did you offer to schedule the next available appointment and put the patient on the wait list?:    When is the first available appointment: 8/11/20  What is the nature of the appointment: New BP Rx making feel weak  What visit type: EP   Patient preference of timeframe to be scheduled:  Today ASAP, has someone that can drive patient  Comments: Can not drive, the 2:00pm appt is to late in the day for patient to drive with seeing issues, call to discuss further with patient. Patient [phone]      Please call an advise  Thank you

## 2020-08-10 NOTE — TELEPHONE ENCOUNTER
Called pt and instructed that she can skip the rx for coreg until tomorrow appt at 2 Pm.  Pt verbalized understanding.    Awilda

## 2020-08-10 NOTE — TELEPHONE ENCOUNTER
PT MADE APPT TO SEE YOU TOMORROW AT 2 PM.  WANTS TO DISCUSS COREG RX, MAKES HER WEAK AND WANTS TO WAIT TIL TOMORROW TO SEE YOU.    PLEASE ADVISE IF SHE SHOULD SKIP OR STILL TAKE. SHE HAS SKIPPED IT SOMETIMES    BERYL

## 2020-08-11 ENCOUNTER — OFFICE VISIT (OUTPATIENT)
Dept: INTERNAL MEDICINE | Facility: CLINIC | Age: 75
End: 2020-08-11
Payer: MEDICARE

## 2020-08-11 VITALS
HEART RATE: 98 BPM | DIASTOLIC BLOOD PRESSURE: 96 MMHG | OXYGEN SATURATION: 99 % | WEIGHT: 206.38 LBS | SYSTOLIC BLOOD PRESSURE: 160 MMHG | HEIGHT: 65 IN | BODY MASS INDEX: 34.38 KG/M2

## 2020-08-11 DIAGNOSIS — I10 HYPERTENSION, ESSENTIAL: Primary | ICD-10-CM

## 2020-08-11 DIAGNOSIS — F41.9 ANXIETY: ICD-10-CM

## 2020-08-11 PROCEDURE — 99213 OFFICE O/P EST LOW 20 MIN: CPT | Mod: PBBFAC | Performed by: INTERNAL MEDICINE

## 2020-08-11 PROCEDURE — 99999 PR PBB SHADOW E&M-EST. PATIENT-LVL III: CPT | Mod: PBBFAC,,, | Performed by: INTERNAL MEDICINE

## 2020-08-11 PROCEDURE — 99214 OFFICE O/P EST MOD 30 MIN: CPT | Mod: S$PBB,,, | Performed by: INTERNAL MEDICINE

## 2020-08-11 PROCEDURE — 99214 PR OFFICE/OUTPT VISIT, EST, LEVL IV, 30-39 MIN: ICD-10-PCS | Mod: S$PBB,,, | Performed by: INTERNAL MEDICINE

## 2020-08-11 PROCEDURE — 99999 PR PBB SHADOW E&M-EST. PATIENT-LVL III: ICD-10-PCS | Mod: PBBFAC,,, | Performed by: INTERNAL MEDICINE

## 2020-08-11 RX ORDER — IRBESARTAN 75 MG/1
75 TABLET ORAL NIGHTLY
Qty: 90 TABLET | Refills: 3 | Status: SHIPPED | OUTPATIENT
Start: 2020-08-11 | End: 2020-08-12

## 2020-08-11 NOTE — PROGRESS NOTES
"Subjective:       Patient ID: Caprice Gutierrez is a 74 y.o. female.    Chief Complaint: Extremity Weakness    HPI   She has called several times with complaints related to BP meds.  She c/o weak even after carvedilol decr to half a tab bid.  She thinks low sodium diet worsened her symptoms.    Home bp:  Early am 124/82, 137/85, 125/83, 138/81, 132/85, 14/92, 146/88, 132/86, 107/73, 149/91, 130/71 when taking "pieces of 25 mg carvedilol."    Anxious, worse since Covid.  .  Taking a piece of xanax most days.  She has been increasingly apprehensive since episode of dehydration .  She admits that she tells herself she will feel bad when she takes her anti-hypertensives.  She was prescribed zoloft for VIOLETTA, but was too apprehensive to take.    HCTZ stopped June, 2020 due to weakness.        Review of Systems   Constitutional: Negative for fever and unexpected weight change.   HENT: Negative for nasal congestion and postnasal drip.    Eyes: Negative for pain, discharge and visual disturbance.   Respiratory: Negative for cough, chest tightness, shortness of breath and wheezing.    Cardiovascular: Negative for chest pain and leg swelling.   Gastrointestinal: Negative for abdominal pain, constipation, diarrhea and nausea.   Genitourinary: Negative for difficulty urinating, dysuria and hematuria.   Integumentary:  Negative for rash.   Neurological: Negative for headaches.   Psychiatric/Behavioral: Negative for dysphoric mood and sleep disturbance. The patient is not nervous/anxious.          Objective:      Physical Exam  Constitutional:       Appearance: Normal appearance. She is not ill-appearing.   Neurological:      General: No focal deficit present.      Mental Status: She is alert and oriented to person, place, and time.   Psychiatric:         Mood and Affect: Mood normal.         Behavior: Behavior normal.         Her machine:  185/121, 175/110    Mine:  171/104.  Assessment:       1. Hypertension, essential    2. " Anxiety     3.   Carvedilol intolerance  BP machine is accurate  Plan:       Caprice was seen today for extremity weakness.    Diagnoses and all orders for this visit:    Hypertension, essential    Anxiety    Other orders  -     irbesartan (AVAPRO) 75 MG tablet; Take 1 tablet (75 mg total) by mouth every evening.       Start zoloft!      Begin irbesartan     Stop carvedilol    Reassurance.

## 2020-08-12 ENCOUNTER — TELEPHONE (OUTPATIENT)
Dept: INTERNAL MEDICINE | Facility: CLINIC | Age: 75
End: 2020-08-12

## 2020-08-12 ENCOUNTER — NURSE TRIAGE (OUTPATIENT)
Dept: ADMINISTRATIVE | Facility: CLINIC | Age: 75
End: 2020-08-12

## 2020-08-12 DIAGNOSIS — I10 UNCONTROLLED HYPERTENSION: Primary | ICD-10-CM

## 2020-08-12 RX ORDER — IRBESARTAN 150 MG/1
150 TABLET ORAL NIGHTLY
Qty: 90 TABLET | Refills: 3 | Status: SHIPPED | OUTPATIENT
Start: 2020-08-12 | End: 2020-08-18

## 2020-08-12 NOTE — TELEPHONE ENCOUNTER
Ps call - may take a 2nd dose of irbesartan, and tomorrow am take 150 mg and then 150 mg every am. I will change rx.

## 2020-08-12 NOTE — TELEPHONE ENCOUNTER
Pt called right back after speaking with her about taking an additional irbesartan. Pt says she woke up from her nap and she felt dizzy/weak (pt says she normally feels dizzy/weak after waking up). She denies chest pains, SOB or any other symptoms. She is nervous to take an additional irbesartan since she is dizzy. Pt current BP is 168/102 P 108  Please advise

## 2020-08-12 NOTE — TELEPHONE ENCOUNTER
Spoke with it didn't make her sick but bp was still high she thinks she should get the second dose.

## 2020-08-12 NOTE — TELEPHONE ENCOUNTER
----- Message from Marek Gonzalez sent at 8/12/2020  9:55 AM CDT -----  Regarding: Advice  Contact: Patient [phone]  Patient would like to get medical advice.    Comments: Patient calling stating the Rx  irbesartan (AVAPRO) 75 MG tablet  is not helping the BP, readings: started yesterday at 7am to 5pm, but this morning first reading 172/99 pulse 89, 155/106 P 110, 182/106 P 81, 188/111 P 8, 174/108 P 83, 168/101 P 75, 175/101 P 73 152/98 P 73 153/104 P 77, 154/100 P 80, 157-102 P 80, 162/102 P 79 173/104/ P 83, 175/104 P 82, Stating a lot of the reading was taking in the row, stating is having a lot of anxiety, call with next steps, would like a call back ASAP today, would like a stronger Rx.     Patient [phone]    Please call an advise  Thank you

## 2020-08-12 NOTE — TELEPHONE ENCOUNTER
She needs to take it as BP too high.  If she doesn't trust my advise I'd be glad to refer to cardiology

## 2020-08-12 NOTE — TELEPHONE ENCOUNTER
Notified of message below. Pt repeated back and verbalized understanding    Clear bilaterally, pupils equal

## 2020-08-12 NOTE — TELEPHONE ENCOUNTER
Pt just woke, spoke with Dr. Garcia's nurse 10 min ago and trying to return call, working on getting BP regulated. Given pills yesterday and it didn't do anything for her. Pt was told to take another pill today but pt now feeling weak. /102 with HR of 108, pt c/o of generalized weakness. Denies dizziness, chest pain, SOB, unilateral weakness or other deficits. S/w Cathi Gilmore LPN of Dr. Perry's office. She will call pt now.     Reason for Disposition   Systolic BP >= 160 OR Diastolic >= 100, and any cardiac or neurologic symptoms (e.g., chest pain, difficulty breathing, unsteady gait, blurred vision)    Additional Information   Negative: Sounds like a life-threatening emergency to the triager   Negative: Pregnant > 20 weeks and new hand or face swelling   Negative: Pregnant > 20 weeks and BP > 140/90    Protocols used: HIGH BLOOD PRESSURE-A-OH

## 2020-08-12 NOTE — TELEPHONE ENCOUNTER
----- Message from Caitlyn Mei sent at 8/12/2020 11:38 AM CDT -----  Contact: [phone]  Patient is returning a phone call.  Who left a message for the patient: ?  Does patient know what this is regarding:  ?  Comments:

## 2020-08-13 NOTE — TELEPHONE ENCOUNTER
1.  Increase Irbesartan to 300 mg daily ( 4 x 75 mg tabs). She may take an additional 150 mg no, as she too 150 mg earlier today.    2.  It is very important to control anxiety - so take Zoloft daily.    3.  MOnitor BP and call if readings remain elevated.

## 2020-08-13 NOTE — TELEPHONE ENCOUNTER
----- Message from Paula Bryan sent at 8/13/2020  2:48 PM CDT -----  Regarding: advice  Contact: Patient [phone]  or cell [phone]  Medications is not working. Patient increased medication but it makes her nauseous and she cannot eat.  Pressure has not decreased.170/111

## 2020-08-13 NOTE — TELEPHONE ENCOUNTER
----- Message from Sana Bruner sent at 8/13/2020  9:47 AM CDT -----  Contact: Patient [phone]  Received a referral for Cardiology and wants to know if they are the ones to order a sleep study or does that come from you.    Please call and advise.    Thank You

## 2020-08-13 NOTE — TELEPHONE ENCOUNTER
Pt states that the medication was changed however it has no effect on her bp. Pt states that her bp is 170/105. Pt doubled the dose this morning however it has cut her appetite. Pt states that she suffers with anxiety as well, so she thinks this raises her bp 210/120 p 104

## 2020-08-14 ENCOUNTER — PATIENT OUTREACH (OUTPATIENT)
Dept: ADMINISTRATIVE | Facility: OTHER | Age: 75
End: 2020-08-14

## 2020-08-14 ENCOUNTER — TELEPHONE (OUTPATIENT)
Dept: INTERNAL MEDICINE | Facility: CLINIC | Age: 75
End: 2020-08-14

## 2020-08-14 NOTE — PROGRESS NOTES
Requested updates within Care Everywhere.  Patient's chart was reviewed for overdue SHANTANU topics.  Immunizations reconciled.    Orders placed:n/a  Tasked appts:n/a  Labs Linked:n/a

## 2020-08-14 NOTE — TELEPHONE ENCOUNTER
spoke with pt, she says she is feeling great today. Her BP has come down and she was not dizzy at all today.  BP this am 124/87. Pt is very pleased

## 2020-08-18 ENCOUNTER — TELEPHONE (OUTPATIENT)
Dept: INTERNAL MEDICINE | Facility: CLINIC | Age: 75
End: 2020-08-18

## 2020-08-18 RX ORDER — IRBESARTAN 150 MG/1
150 TABLET ORAL NIGHTLY
Qty: 90 TABLET | Refills: 3 | OUTPATIENT
Start: 2020-08-18 | End: 2021-08-18

## 2020-08-18 RX ORDER — IRBESARTAN 300 MG/1
300 TABLET ORAL NIGHTLY
Qty: 90 TABLET | Refills: 3 | Status: SHIPPED | OUTPATIENT
Start: 2020-08-18 | End: 2021-05-13

## 2020-08-18 NOTE — TELEPHONE ENCOUNTER
I did.    I don' t think insurance will cover 150 bid so I sent in 300 mg.  She may cut in half and take BID if she doesn't want to take whole tab once a day.

## 2020-08-18 NOTE — TELEPHONE ENCOUNTER
----- Message from Sana Bruner sent at 8/18/2020  7:40 AM CDT -----  Contact: Patient [phone]  Patient states that she was seen on 08/11/2020 and was told to do a 4 week follow up. Next available is 09/28/2020 and patient wants a sooner appt.    Please call and advise.    Thank You

## 2020-08-18 NOTE — TELEPHONE ENCOUNTER
Pt has been scheduled with Shandra, and she is requesting that you make sure you please send in her new prescription.

## 2020-08-18 NOTE — TELEPHONE ENCOUNTER
----- Message from Sana Bruner sent at 8/18/2020  7:37 AM CDT -----  Contact: Patient [phone]  Patient states that you told her to take Rx irbesartan (AVAPRO) 150 MG tablet 2 in the morning and 2 in the evening. Please update the Rx as the patient will be out tomorrow.    [facility] [id number] [facility] [address]    Please call and advise.    Thank You

## 2020-08-18 NOTE — TELEPHONE ENCOUNTER
No new care gaps identified.  Powered by [employer]. Reference number: [id number]. 8/18/2020 10:06:48 AM   ROMARIOT

## 2020-08-20 ENCOUNTER — TELEPHONE (OUTPATIENT)
Dept: INTERNAL MEDICINE | Facility: CLINIC | Age: 75
End: 2020-08-20

## 2020-09-08 ENCOUNTER — OFFICE VISIT (OUTPATIENT)
Dept: INTERNAL MEDICINE | Facility: CLINIC | Age: 75
End: 2020-09-08
Payer: MEDICARE

## 2020-09-08 VITALS
HEART RATE: 61 BPM | HEIGHT: 65 IN | WEIGHT: 205.5 LBS | DIASTOLIC BLOOD PRESSURE: 79 MMHG | BODY MASS INDEX: 34.24 KG/M2 | SYSTOLIC BLOOD PRESSURE: 133 MMHG

## 2020-09-08 DIAGNOSIS — I10 HYPERTENSION, ESSENTIAL: ICD-10-CM

## 2020-09-08 DIAGNOSIS — F32.A DEPRESSION, UNSPECIFIED DEPRESSION TYPE: ICD-10-CM

## 2020-09-08 DIAGNOSIS — I70.0 AORTIC ATHEROSCLEROSIS: ICD-10-CM

## 2020-09-08 DIAGNOSIS — R73.03 PREDIABETES: ICD-10-CM

## 2020-09-08 DIAGNOSIS — E78.5 HYPERLIPIDEMIA, UNSPECIFIED HYPERLIPIDEMIA TYPE: Primary | ICD-10-CM

## 2020-09-08 DIAGNOSIS — E55.9 VITAMIN D DEFICIENCY: ICD-10-CM

## 2020-09-08 DIAGNOSIS — F41.9 ANXIETY: ICD-10-CM

## 2020-09-08 PROCEDURE — 99214 PR OFFICE/OUTPT VISIT, EST, LEVL IV, 30-39 MIN: ICD-10-PCS | Mod: S$PBB,,, | Performed by: PHYSICIAN ASSISTANT

## 2020-09-08 PROCEDURE — 99214 OFFICE O/P EST MOD 30 MIN: CPT | Mod: PBBFAC | Performed by: PHYSICIAN ASSISTANT

## 2020-09-08 PROCEDURE — 99999 PR PBB SHADOW E&M-EST. PATIENT-LVL IV: CPT | Mod: PBBFAC,,, | Performed by: PHYSICIAN ASSISTANT

## 2020-09-08 PROCEDURE — 99999 PR PBB SHADOW E&M-EST. PATIENT-LVL IV: ICD-10-PCS | Mod: PBBFAC,,, | Performed by: PHYSICIAN ASSISTANT

## 2020-09-08 PROCEDURE — 99214 OFFICE O/P EST MOD 30 MIN: CPT | Mod: S$PBB,,, | Performed by: PHYSICIAN ASSISTANT

## 2020-09-08 RX ORDER — SERTRALINE HYDROCHLORIDE 25 MG/1
25 TABLET, FILM COATED ORAL DAILY
Qty: 30 TABLET | Refills: 11 | OUTPATIENT
Start: 2020-09-08 | End: 2020-09-18

## 2020-09-08 NOTE — PROGRESS NOTES
Subjective:       Patient ID: Caprice Gutierrez is a 74 y.o. female.        Chief Complaint: Follow-up    Caprice Gutierrez is an established patient of Nayely Garcia MD here today for follow up visit.     HTN -   8/10/2020 - coreg stopped, started on irbesartan  Home /79, 125/79, 103/72, 126/86, 126/83, 133/85, 135/87, 141/88, 130/84, 122/91, 121/76, 131/85, 132/82, 128/81, 145/87, 126/75  Perseverating on BP and checking constantly, fuels her anxiety, BP of late has had a very stable pattern since starting irbesartan  Home cuff checked today and accurate    10/29/19  Last visit decided to continue olmesartan 20 mg daily, carvedilol 25 mg BID  Added hctz 12.5 mg secondary to ankle swelling  Here today and she has stopped olmesartan as it was causing leg cramps, reports that upon stopping the olmesartan the leg cramps have resolved  For the past 2 weeks she has been taking carvedilol 25 mg BID and hctz 12.5 mg with no ill effects and acceptable BP at home, in office BP is much better compared to last visit although still not quite at goal     10/10/19  Previously on olmesartan hct 40-25 mg daily but had two ED trips for generalized weakness that she attributed to dehydration from hctz, saw Dr. Garcia in f/u 8/2019 and olmesartan 40-25 mg stopped and instead started on just olmesartan 20 mg daily, previously on valsartan that caused dizziness, continued on carvedilol 25 mg BID, since stopping hctz 25 mg some fluid retention in ankles that bothers her     Anxiety - gets very anxious when she comes in and in general, takes xanax 1 1/2 tablet qday-BID, still has not started zoloft but plans to start, lost the rx and needs a refill     Vitamin d def - taking vitamin d 50,000 U weekly, vitamin d level 43 11/2018     Lipids - NOT taking pravastatin 40 mg daily, declines starting  Lab Results       Component                Value               Date                       CHOL                         194                  09/03/2019                 TRIG                            61                  09/03/2019                 HDL                             67                  09/03/2019                 LDLCALC                  114.8               09/03/2019               Prediabetes -  Lab Results       Component                Value               Date                       HGBA1C                   5.8 (H)             06/12/2020                 HGBA1C                   5.9 (H)             09/03/2019                 HGBA1C                   5.7 (H)             11/14/2018              Aortic atherosclerosis - declines statin for now, taking aspirin 81 mg         Review of Systems   Constitutional: Negative for chills, diaphoresis, fatigue and fever.   HENT: Negative for congestion and sore throat.    Eyes: Negative for visual disturbance.   Respiratory: Negative for cough, chest tightness and shortness of breath.    Cardiovascular: Negative for chest pain, palpitations and leg swelling.   Gastrointestinal: Negative for abdominal pain, blood in stool, constipation, diarrhea, nausea and vomiting.   Genitourinary: Negative for dysuria, frequency, hematuria and urgency.   Musculoskeletal: Negative for arthralgias and back pain.   Skin: Negative for rash.   Neurological: Negative for dizziness, syncope, weakness and headaches.   Psychiatric/Behavioral: Negative for dysphoric mood and sleep disturbance. The patient is nervous/anxious.        Objective:      Physical Exam  Vitals signs and nursing note reviewed.   Constitutional:       Appearance: Normal appearance. She is well-developed.   HENT:      Head: Normocephalic.      Right Ear: External ear normal.      Left Ear: External ear normal.   Eyes:      Pupils: Pupils are equal, round, and reactive to light.   Cardiovascular:      Rate and Rhythm: Normal rate and regular rhythm.      Heart sounds: Normal heart sounds. No murmur. No friction rub. No gallop.    Pulmonary:       Effort: Pulmonary effort is normal. No respiratory distress.      Breath sounds: Normal breath sounds.   Abdominal:      Palpations: Abdomen is soft.      Tenderness: There is no abdominal tenderness.   Skin:     General: Skin is warm and dry.   Neurological:      Mental Status: She is alert.         Assessment:       1. Hyperlipidemia, unspecified hyperlipidemia type    2. Anxiety    3. Depression, unspecified depression type    4. Aortic atherosclerosis    5. Vitamin D deficiency    6. Prediabetes    7. Hypertension, essential        Plan:       Caprice was seen today for follow-up.    Diagnoses and all orders for this visit:    Hyperlipidemia, unspecified hyperlipidemia type - declines statin    Anxiety  -     sertraline (ZOLOFT) 25 MG tablet; Take 1 tablet (25 mg total) by mouth once daily. (Patient not taking: Reported on 9/8/2020)    Depression, unspecified depression type  -     sertraline (ZOLOFT) 25 MG tablet; Take 1 tablet (25 mg total) by mouth once daily. (Patient not taking: Reported on 9/8/2020)    Aortic atherosclerosis - taking aspirin, declines statin    Vitamin D deficiency - continue supplement    Prediabetes    Hypertension, essential - stable and controlled in review of home readings although in office reading is high today, she is very anxious    Start zoloft!    Home log of BP reviewed and appears well controlled, home cuff checked and accurate, given the anxiety she experiences with checking, advised to only check 1/week or stop completely given stable pattern of readings on irbesartan, which thankfully she is tolerating    F/u 6 weeks to see how she is doing on zoloft    Pt has been given instructions populated from [employer] database and has verbalized understanding of the after visit summary and information contained wherein.    Follow up with a primary care provider. May go to ER for acute shortness of breath, lightheadedness, fever, or any other emergent complaints or changes in  "condition.    "This note will be shared with the patient"    Future Appointments   Date Time Provider Department Center   9/17/2020 11:00 AM Eamon Azul MD [facility] O[facility]AL [facility]   9/28/2020 11:00 AM Nayely Garcia MD [facility] IM [facility]   10/20/2020 10:30 AM Shandra Day PA-C [facility]                 "

## 2020-09-08 NOTE — PATIENT INSTRUCTIONS
Stop checking BP daily as it is causing you too much anxiety - check once/weekly.    Start taking your zoloft!  This will help with your anxiety.

## 2020-09-14 ENCOUNTER — PATIENT OUTREACH (OUTPATIENT)
Dept: ADMINISTRATIVE | Facility: HOSPITAL | Age: 75
End: 2020-09-14

## 2020-09-15 ENCOUNTER — TELEPHONE (OUTPATIENT)
Dept: INTERNAL MEDICINE | Facility: CLINIC | Age: 75
End: 2020-09-15

## 2020-09-15 DIAGNOSIS — F41.9 ANXIETY: Primary | ICD-10-CM

## 2020-09-15 NOTE — TELEPHONE ENCOUNTER
----- Message from Val Hinds sent at 9/15/2020  7:40 AM CDT -----  Contact: TERESITA SIMPSON [[id number]]  @ [phone]  Patient want to know if there is someone who she can see her at [facility] for her Anxiety. She's getting worst.

## 2020-09-15 NOTE — TELEPHONE ENCOUNTER
I will refer her to our .  Is she taking zoloft?  If tolerating 25 mg well, we can increase to 50 mg.    Let me know if she doesn't hear form them soon.

## 2020-09-17 ENCOUNTER — PATIENT OUTREACH (OUTPATIENT)
Dept: ADMINISTRATIVE | Facility: OTHER | Age: 75
End: 2020-09-17

## 2020-09-17 NOTE — PROGRESS NOTES
Requested updates within Care Everywhere.  Patient's chart was reviewed for overdue SHANTANU topics.  Media reviewed for outside mammogram.  Immunizations reconciled.    Orders placed:n/a  Tasked appts:n/a  Labs Linked:n/a

## 2020-09-18 ENCOUNTER — TELEPHONE (OUTPATIENT)
Dept: BEHAVIORAL HEALTH | Facility: CLINIC | Age: 75
End: 2020-09-18

## 2020-09-18 ENCOUNTER — HOSPITAL ENCOUNTER (EMERGENCY)
Facility: HOSPITAL | Age: 75
Discharge: HOME | End: 2020-09-18
Attending: FAMILY MEDICINE
Payer: MEDICARE

## 2020-09-18 ENCOUNTER — TELEPHONE (OUTPATIENT)
Dept: INTERNAL MEDICINE | Facility: CLINIC | Age: 75
End: 2020-09-18

## 2020-09-18 VITALS
WEIGHT: 205 LBS | BODY MASS INDEX: 34.16 KG/M2 | RESPIRATION RATE: 18 BRPM | TEMPERATURE: 98 F | HEART RATE: 85 BPM | DIASTOLIC BLOOD PRESSURE: 84 MMHG | OXYGEN SATURATION: 99 % | SYSTOLIC BLOOD PRESSURE: 153 MMHG | HEIGHT: 65 IN

## 2020-09-18 DIAGNOSIS — G47.9 SLEEP DISORDER: ICD-10-CM

## 2020-09-18 DIAGNOSIS — F41.9 ANXIETY: Primary | ICD-10-CM

## 2020-09-18 PROCEDURE — 99284 EMERGENCY DEPT VISIT MOD MDM: CPT | Mod: ER

## 2020-09-18 RX ORDER — FLUOXETINE 10 MG/1
10 TABLET ORAL DAILY
Qty: 14 TABLET | Refills: 0 | Status: SHIPPED | OUTPATIENT
Start: 2020-09-18 | End: 2020-09-22 | Stop reason: SDUPTHER

## 2020-09-18 RX ORDER — TEMAZEPAM 7.5 MG/1
15 CAPSULE ORAL NIGHTLY PRN
Qty: 14 CAPSULE | Refills: 0 | Status: SHIPPED | OUTPATIENT
Start: 2020-09-18 | End: 2020-09-22 | Stop reason: SDUPTHER

## 2020-09-18 NOTE — DISCHARGE INSTRUCTIONS
Return to the ED for worsening symptoms, thoughts or hurting yourself or others or if worse in any way.

## 2020-09-19 NOTE — ED PROVIDER NOTES
Encounter Date: 9/18/2020       History     Chief Complaint   Patient presents with    Anxiety     Pt states she is having trouble sleeping, daughter reports anxiety, depression and loss of appetite. Symptoms started X 2 weeks. Denies HI/SI or hallucinations.      Patient is a 75-year-old female with history of anxiety, insomnia, diabetes, hypertension, anemia and hyperlipidemia.  She is presenting with complaint of increased anxiety over the past month.  She has had increased difficulty with staying asleep and often wakes in the night and paces and cries because she is so anxious.  She is not having any suicidal or homicidal ideation.  She does have good family support.  Her daughter is here with her today.  She has seen his PCP for and was prescribed Zoloft.  She tried taking it 1 day and felt like her symptoms were worse and she was crying more so she discontinued it.  She then tried again about a week later and had the same results.  She has been taking 0.5 mg Xanax p.r.n. but it does not help her was staying asleep.  She is not having any obvious stressors at home.         Review of patient's allergies indicates:   Allergen Reactions    Ace inhibitors Rash    Lisinopril Rash     Past Medical History:   Diagnosis Date    Abnormal Pap smear 1985, 2000    [facility]    Allergy     Anemia     Degenerative arthritis of shoulder region     Diabetes mellitus type II     GERD (gastroesophageal reflux disease)     Hyperlipidemia     Hypertension     Keloid cicatrix     Obesity      Past Surgical History:   Procedure Laterality Date    APPENDECTOMY  1980    CERVICAL BIOPSY  W/ LOOP ELECTRODE EXCISION  1985    CHOLECYSTECTOMY  1980    [facility]  2000    COLONOSCOPY N/A 7/12/2016    Procedure: COLONOSCOPY;  Surgeon: Chito Day MD;  Location: [address];  Service: Endoscopy;  Laterality: N/A;    DILATION AND CURETTAGE OF UTERUS  1983    HERNIA REPAIR      LIPOMA RESECTION      TUBAL LIGATION   1985    UMBILICAL HERNIA REPAIR      Wedge resection liver -hemangioma       Family History   Problem Relation Age of Onset    Diabetes Brother     Alcohol abuse Brother     Cirrhosis Brother     Colon cancer Sister 69    Hypertension Sister     Cancer Sister         colon    Diabetes Sister     Arthritis Sister     Cancer Mother         brain    Diabetes Brother     Cancer Brother         lung    Hypertension Maternal Aunt     Stroke Maternal Aunt     Melanoma Neg Hx      Social History     Tobacco Use    Smoking status: Never Smoker    Smokeless tobacco: Never Used   Substance Use Topics    Alcohol use: No    Drug use: No     Review of Systems   Constitutional: Negative for activity change, appetite change, chills, fatigue and fever.   HENT: Negative for congestion, ear pain, rhinorrhea, sore throat and voice change.    Respiratory: Negative for cough, shortness of breath and wheezing.    Cardiovascular: Negative for chest pain.   Musculoskeletal: Negative for neck pain and neck stiffness.   Skin: Negative for rash.   Psychiatric/Behavioral: Positive for sleep disturbance. Negative for confusion, hallucinations, self-injury and suicidal ideas. The patient is nervous/anxious.    All other systems reviewed and are negative.      Physical Exam     Initial Vitals [09/18/20 1253]   BP Pulse Resp Temp SpO2   (!) 172/103 107 (!) 21 98.4 °F (36.9 °C) 100 %      MAP       --         Physical Exam    Nursing note and vitals reviewed.  Constitutional: She appears well-developed and well-nourished. She appears distressed.   HENT:   Head: Normocephalic.   Nose: Nose normal.   Mouth/Throat: Oropharynx is clear and moist.   Eyes: Conjunctivae and EOM are normal. Pupils are equal, round, and reactive to light.   Neck: Normal range of motion. Neck supple.   Cardiovascular: Normal rate, regular rhythm, normal heart sounds and intact distal pulses.   Pulmonary/Chest: Breath sounds normal. No respiratory distress.    Abdominal: Soft. Bowel sounds are normal. There is no abdominal tenderness.   Musculoskeletal: No edema.   Lymphadenopathy:     She has no cervical adenopathy.   Neurological: She is alert and oriented to person, place, and time.   Skin: Skin is warm and dry. No rash noted.   Psychiatric: She has a normal mood and affect. Her behavior is normal. Judgment and thought content normal.   She is mostly having anxiety at night and difficulty with waking often and not being able to go back to sleep.  She is tearful mostly at night.  She does have good family support and people she can call at night when she is having these symptoms.  She is showing very good judgment.  Slightly anxious now but normal thought content.         ED Course   Procedures  Labs Reviewed - No data to display       Imaging Results    None          Medical Decision Making:   I had a lengthy discussion with Mrs. Gutierrez and her granddaughter about her symptoms and various medications and their side effects.  She is afraid to try Zoloft again because she did not like the way that it made her feel.  She does not feel that Xanax is adequately controlling her symptoms except for only about 1-2 hours.  We discussed Prozac and that it can be started at much lower doses where she may not have as much side effects that she was feeling with the Zoloft.  She understands that these medications need to be closely monitored by her PCP.  I did agree to give her a 2 week starter dose of Prozac at 10 mg. I will send a message to her PCP so that she can have close follow-up.  She will call her PCP as well.  She will return to the emergency department for any worsening symptoms, thoughts of hurting herself or others or if worse in any way.  I did also give her a prescription for 7.5 mg Restoril to try at night.  She understands she is not to take Xanax and Restoril at the same time.                             Clinical Impression:       ICD-10-CM ICD-9-CM   1.  Anxiety  F41.9 300.00   2. Sleep disorder  G47.9 780.50                      Disposition:   Disposition: Discharged     ED Disposition Condition    Discharge Stable        ED Prescriptions     Medication Sig Dispense Start Date End Date Auth. Provider    FLUoxetine 10 MG Tab Take 1 tablet (10 mg total) by mouth once daily. 14 tablet 9/18/2020 9/18/2021 RYAN Bowens    temazepam (RESTORIL) 7.5 MG Cap Take 2 capsules (15 mg total) by mouth nightly as needed (insomnia). Do not take with xanax 14 capsule 9/18/2020 10/18/2020 RYAN Bowens        Follow-up Information     Follow up With Specialties Details Why Contact Info    Nayely Garcia MD Internal Medicine In 1 week  [address]  [place] [address]  [phone]                                         RYAN Bowens  09/19/20 7563

## 2020-09-22 ENCOUNTER — OFFICE VISIT (OUTPATIENT)
Dept: INTERNAL MEDICINE | Facility: CLINIC | Age: 75
End: 2020-09-22
Payer: MEDICARE

## 2020-09-22 ENCOUNTER — TELEPHONE (OUTPATIENT)
Dept: INTERNAL MEDICINE | Facility: CLINIC | Age: 75
End: 2020-09-22

## 2020-09-22 VITALS
DIASTOLIC BLOOD PRESSURE: 91 MMHG | WEIGHT: 197.56 LBS | SYSTOLIC BLOOD PRESSURE: 150 MMHG | BODY MASS INDEX: 32.91 KG/M2 | HEIGHT: 65 IN | HEART RATE: 90 BPM | OXYGEN SATURATION: 98 %

## 2020-09-22 DIAGNOSIS — F32.A DEPRESSION, UNSPECIFIED DEPRESSION TYPE: ICD-10-CM

## 2020-09-22 DIAGNOSIS — F41.1 GAD (GENERALIZED ANXIETY DISORDER): Primary | ICD-10-CM

## 2020-09-22 DIAGNOSIS — G47.9 SLEEP DISORDER: ICD-10-CM

## 2020-09-22 PROCEDURE — 99213 OFFICE O/P EST LOW 20 MIN: CPT | Mod: PBBFAC | Performed by: INTERNAL MEDICINE

## 2020-09-22 PROCEDURE — 99999 PR PBB SHADOW E&M-EST. PATIENT-LVL III: ICD-10-PCS | Mod: PBBFAC,,, | Performed by: INTERNAL MEDICINE

## 2020-09-22 PROCEDURE — 99214 PR OFFICE/OUTPT VISIT, EST, LEVL IV, 30-39 MIN: ICD-10-PCS | Mod: S$PBB,,, | Performed by: INTERNAL MEDICINE

## 2020-09-22 PROCEDURE — 99214 OFFICE O/P EST MOD 30 MIN: CPT | Mod: S$PBB,,, | Performed by: INTERNAL MEDICINE

## 2020-09-22 PROCEDURE — 99999 PR PBB SHADOW E&M-EST. PATIENT-LVL III: CPT | Mod: PBBFAC,,, | Performed by: INTERNAL MEDICINE

## 2020-09-22 RX ORDER — FLUOXETINE 10 MG/1
10 TABLET ORAL DAILY
Qty: 30 TABLET | Refills: 5 | Status: SHIPPED | OUTPATIENT
Start: 2020-09-22 | End: 2020-10-08

## 2020-09-22 RX ORDER — TEMAZEPAM 7.5 MG/1
CAPSULE ORAL
Qty: 30 CAPSULE | Refills: 2 | Status: SHIPPED | OUTPATIENT
Start: 2020-09-22 | End: 2020-10-23 | Stop reason: ALTCHOICE

## 2020-09-22 NOTE — TELEPHONE ENCOUNTER
pls call - I thought I refilled the same restoril dose which was prescribed before.  What dose would they like me to order?

## 2020-09-22 NOTE — PATIENT INSTRUCTIONS
Walk every day.    Restoril 1-2 caps at night for sleep.    Continue prozac 10 mg daily.  If tolerated well, will increase to 20 mg daily in another 2-3 weeks.

## 2020-09-22 NOTE — PROGRESS NOTES
Subjective:       Patient ID: Caprice Gutierrez is a 75 y.o. female.    Chief Complaint: Follow-up (ED) and Anxiety    HPI   Very anxious and depressed.  No energy.    Woke up in a panic.  Afraid to sleep, as afraid she might die.  Fearful when she awakens.  Still walking.  On her birthday anxiety worsened and she went to ED.  She does better when with her daughters, and has moved in with 1 of them. .Feels weak, so she is not walking.     She was prescribed temazepam and prozac.    She is tolerating prozac, but anxiety has not yet improved.  She typically falls asleep easily but makes herself awaken to take temazepam later in day.    Discussed with daughter - she has many unresolved family issues, feelings of guilt and regret.      She has an upcoming appt with psychiatry and would like to start therapy.    Htn has been better controlled, but is elevated today.    Review of Systems   Constitutional: Negative for fever and unexpected weight change.   HENT: Negative for nasal congestion and postnasal drip.    Eyes: Negative for pain, discharge and visual disturbance.   Respiratory: Negative for cough, chest tightness, shortness of breath and wheezing.    Cardiovascular: Negative for chest pain and leg swelling.   Gastrointestinal: Negative for abdominal pain, constipation, diarrhea and nausea.   Genitourinary: Negative for difficulty urinating, dysuria, flank pain and hematuria.   Integumentary:  Negative for rash.   Neurological: Negative for headaches.   Psychiatric/Behavioral: Positive for dysphoric mood and sleep disturbance. The patient is nervous/anxious.          Objective:      Physical Exam  Constitutional:       Appearance: Normal appearance. She is obese.   Neurological:      Mental Status: She is alert and oriented to person, place, and time.   Psychiatric:      Comments: Anxious.         Assessment:       1. VIOLETTA (generalized anxiety disorder)    2. Depression, unspecified depression type    3. Sleep  disorder        Plan:       Caprice was seen today for follow-up and anxiety.    Diagnoses and all orders for this visit:    VILOETTA (generalized anxiety disorder)  -     Ambulatory referral/consult to Primary Care Behavioral Health (Non-Opioids); Future    Depression, unspecified depression type  -     Ambulatory referral/consult to Primary Care Behavioral Health (Non-Opioids); Future    Sleep disorder  -     Ambulatory referral/consult to Primary Care Behavioral Health (Non-Opioids); Future         Long talk.  See pt instructions

## 2020-09-22 NOTE — TELEPHONE ENCOUNTER
----- Message from Omar Guzman sent at 9/22/2020  1:57 PM CDT -----  Contact: [place]- [phone] (Phone)  Pharmacy is calling to clarify or change an RX.  RX name:  temazepam (RESTORIL) 7.5 MG Cap  What do they need to clarify:  Cannot fill this dose  Additional comments: Wants to know if they can switch back to the original Rx and how it was sent.     Pt is also saying she was supposed to get Prozac but the pharmacy did not received it.

## 2020-09-23 ENCOUNTER — TELEPHONE (OUTPATIENT)
Dept: INTERNAL MEDICINE | Facility: CLINIC | Age: 75
End: 2020-09-23

## 2020-09-23 NOTE — TELEPHONE ENCOUNTER
----- Message from Omar Guzman sent at 9/23/2020 12:27 PM CDT -----  Contact: [place]- [phone] (Phone)  Contact: [place]- [phone] (Phone)  Pharmacy is calling to clarify or change an RX.  RX name:  temazepam (RESTORIL) 7.5 MG Cap  What do they need to clarify:  Cannot fill this dose  Additional comments: Wants to know if they can switch back to the original Rx and how it was sent.      Pt is also saying she was supposed to get Prozac but the pharmacy did not received it.   2nd attempt

## 2020-09-28 ENCOUNTER — TELEPHONE (OUTPATIENT)
Dept: BEHAVIORAL HEALTH | Facility: CLINIC | Age: 75
End: 2020-09-28

## 2020-09-28 ENCOUNTER — TELEPHONE (OUTPATIENT)
Dept: INTERNAL MEDICINE | Facility: CLINIC | Age: 75
End: 2020-09-28

## 2020-09-28 NOTE — TELEPHONE ENCOUNTER
----- Message from Brii Jay sent at 9/28/2020  8:41 AM CDT -----  Contact: Caprice villagran [phone] or [phone]  Type:  Needs Medical Advice    Who Called: Caprice tyshawn  Symptoms (please be specific):   How long has patient had these symptoms:   Pharmacy name and phone #:   Would the patient rather a call back or a response via [facility]? Call back  Best Call Back Number: [phone] or [phone]  Additional Information: Pt is requesting a call back from the nurse because they were supposed to have a  call her but she stated that no one has reached out to her

## 2020-09-29 ENCOUNTER — PATIENT MESSAGE (OUTPATIENT)
Dept: OTHER | Facility: OTHER | Age: 75
End: 2020-09-29

## 2020-09-29 ENCOUNTER — TELEPHONE (OUTPATIENT)
Dept: BEHAVIORAL HEALTH | Facility: CLINIC | Age: 75
End: 2020-09-29

## 2020-09-29 NOTE — PROGRESS NOTES
Behavioral Health Community Health Worker  Initial Assessment  Completed by:  Hua Dolan    Date:  9/29/2020    Patient Enrollment in Behavioral Health Program:  Patient verbalized understanding of Behavioral Health Integration services to include:  Patient understands that CHW, LCSW, PharmD and consulting Psychiatrist are members of the care team working collaboratively with his/her primary care provider: Yes  Patient understands that activation of their [facility] patient portal account is required for accessing the full scope of team services: Yes  Patient understands that some counseling sessions may occur via video: Yes  Clinic visits with the psychiatrist may be subject to a co-pay based on your insurance: Yes  Patient consents to enroll in BHI program: Yes    Assessments     Single Item Health Literacy Scale:  How often do you need to have someone help you read instructions, pamphlets or other written material from your doctor or pharmacy?: Never    Promis 10:  Promis 10 Responses  In general, would you say your health is: Very good  In general, would you say your quality of life is: Very good  In general, how would you rate your physical health?: Very good  In general, how would you rate your mental health, including your mood and your ability to think?: Fair  In general, how would you rate your satisfaction with your social activities and relationships?: Very good  In general, please rate how well you carry out your usual social activities and roles. (This includes activities at home, at work and in your community, and responsibilities as a parent, child, spouse, employee, friend, etc.): Good  To what extent are you able to carry out your everyday physical activities such as walking, climbing stairs, carrying groceries, or moving a chair? : Mostly  In the past 7 days, how often have you been bothered by emotional problems such as feeling anxious, depressed or irritable?: Often  In the past 7 days, how  would you rate your fatigue on average?: Moderate  In the past 7 days, on a scale of 0 to 10 (where 0 is no pain and 10 is the worst pain imaginable) how would you rate your pain on average?: 0  Global Physical Health: 11  Global Mental health Score: 14    Depression PHQ:  PHQ9 9/29/2020   Total Score 11         Generalized Anxiety Disorder 7-Item Scale:  GAD7 9/29/2020   1. Feeling nervous, anxious, or on edge? 2   2. Not being able to stop or control worrying? 3   3. Worrying too much about different things? 3   4. Trouble relaxing? 1   5. Being so restless that it is hard to sit still? 2   6. Becoming easily annoyed or irritable? 2   7. Feeling afraid as if something awful might happen? 2   VIOLETTA-7 Score 15       History     Social History     Socioeconomic History    Marital status:      Spouse name: Not on file    Number of children: Not on file    Years of education: Not on file    Highest education level: Not on file   Occupational History    Occupation: teacher   Social Needs    Financial resource strain: Not on file    Food insecurity     Worry: Not on file     Inability: Not on file    Transportation needs     Medical: Not on file     Non-medical: Not on file   Tobacco Use    Smoking status: Never Smoker    Smokeless tobacco: Never Used   Substance and Sexual Activity    Alcohol use: No    Drug use: No    Sexual activity: Never     Birth control/protection: Post-menopausal   Lifestyle    Physical activity     Days per week: 7 days     Minutes per session: 30 min    Stress: Rather much   Relationships    Social connections     Talks on phone: More than three times a week     Gets together: Once a week     Attends Hindu service: More than 4 times per year     Active member of club or organization: Yes     Attends meetings of clubs or organizations: More than 4 times per year     Relationship status:    Other Topics Concern    Are you pregnant or think you may be? Not Asked  "   Breast-feeding Not Asked   Social History Narrative    Walking 1 h 5 days a week.       Call Summary     Patient was referred to the BHI (Non-opioid) program by Primary Care Provider, Dr. Garcia  CHW contacted Caprice Gutierrez who reports depression and anxiety  that limits [her] activities of daily living (ADLs).   Patient scored "11" on the PHQ9 and "15" on the VIOLETTA 7. Based on these scores patient is eligible for the Behavioral health Integration (Non-opioid) Program. [facility] completed the intake and scheduled an appointment for patient with Tyrese Gallego LCSW, on Tuesday October 13,2020 at 8am.         "

## 2020-10-08 ENCOUNTER — TELEPHONE (OUTPATIENT)
Dept: INTERNAL MEDICINE | Facility: CLINIC | Age: 75
End: 2020-10-08

## 2020-10-08 RX ORDER — ESCITALOPRAM OXALATE 10 MG/1
10 TABLET ORAL DAILY
Qty: 30 TABLET | Refills: 1 | Status: SHIPPED | OUTPATIENT
Start: 2020-10-08 | End: 2020-10-16

## 2020-10-08 NOTE — TELEPHONE ENCOUNTER
Message left of machine.  Will try a different antidepressant, though I doubt prozac is making her more anxious.

## 2020-10-08 NOTE — TELEPHONE ENCOUNTER
----- Message from Saige Chen sent at 10/8/2020 10:02 AM CDT -----  Contact: self/ [phone]  Would like to get medical advice.  Symptoms (please be specific):   patient is taking Prozac and it makes her nervous and patient would like to know if she can stop taking the Prozac, please call and advise.   How long has patient had these symptoms:    Pharmacy name and phone # (copy from chart):  [facility] [address] [address] [phone] (Phone)  [phone] (Fax)  Any drug allergies (copy from chart):      Would the patient rather a call back or a response via [facility]?:    Comments:

## 2020-10-12 ENCOUNTER — TELEPHONE (OUTPATIENT)
Dept: BEHAVIORAL HEALTH | Facility: CLINIC | Age: 75
End: 2020-10-12

## 2020-10-12 ENCOUNTER — TELEPHONE (OUTPATIENT)
Dept: INTERNAL MEDICINE | Facility: CLINIC | Age: 75
End: 2020-10-12

## 2020-10-12 NOTE — TELEPHONE ENCOUNTER
----- Message from Miya Lucio sent at 10/12/2020  4:05 PM CDT -----  Contact: Pt [phone]  Patient is requesting a call about escitalopram oxalate (LEXAPRO) 10 MG tablet because it has been making her sick. Wants to know if she can go back to her old prescription. She wants to know if the escitalopram oxalate (LEXAPRO) 10 MG tablet is already in the system.    Please call and advise.    Thank You

## 2020-10-12 NOTE — TELEPHONE ENCOUNTER
Spoke with pt . Informed her Dr Garcia said she could go back to taking the Prozac she will add it back to her medication list . Verbalized understanding.

## 2020-10-12 NOTE — PROGRESS NOTES
CHW reached out to pt to remind (her) of appointment with Tyrese Gallego LCSW,  on  tomorrow no answer left vm

## 2020-10-13 ENCOUNTER — OFFICE VISIT (OUTPATIENT)
Dept: BEHAVIORAL HEALTH | Facility: CLINIC | Age: 75
End: 2020-10-13
Payer: MEDICARE

## 2020-10-13 DIAGNOSIS — F41.9 ANXIETY: ICD-10-CM

## 2020-10-13 DIAGNOSIS — F43.10 PTSD (POST-TRAUMATIC STRESS DISORDER): Primary | ICD-10-CM

## 2020-10-13 PROCEDURE — 99211 OFF/OP EST MAY X REQ PHY/QHP: CPT | Mod: PBBFAC | Performed by: SOCIAL WORKER

## 2020-10-13 PROCEDURE — 99999 PR PBB SHADOW E&M-EST. PATIENT-LVL I: CPT | Mod: PBBFAC,,, | Performed by: SOCIAL WORKER

## 2020-10-13 PROCEDURE — 90791 PR PSYCHIATRIC DIAGNOSTIC EVALUATION: ICD-10-PCS | Mod: ,,, | Performed by: SOCIAL WORKER

## 2020-10-13 PROCEDURE — 90791 PSYCH DIAGNOSTIC EVALUATION: CPT | Mod: ,,, | Performed by: SOCIAL WORKER

## 2020-10-13 PROCEDURE — 99999 PR PBB SHADOW E&M-EST. PATIENT-LVL I: ICD-10-PCS | Mod: PBBFAC,,, | Performed by: SOCIAL WORKER
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
## 2020-10-13 NOTE — Clinical Note
Hi Dr. He,     Is there anyway you could see this pt for an evaluation for individual therapy, she would benefit from trauma work. She has never worked through her hx of trauma before. She will see Ruel Rodriguez MD in Psychiatry on 10/23/2020 for medication management.     Let me know what you think....    Thank you,  Tyrese Gallego LCSW (from [facility] in Primary Care)

## 2020-10-13 NOTE — LETTER
October 13, 2020      Nayely Garcia MD  [address]  [facility] [address]           [facility] - Primary Care Behavioral Health Integration  [address]  [facility] [address]  Phone: [phone]  Fax: [phone]          Patient: Caprice Gutierrez   MR Number: [id number]   YOB: 1945   Date of Visit: 10/13/2020       Dear Dr. Nayely Garcia:    Thank you for referring Caprice Gutierrez to me for evaluation. Attached you will find relevant portions of my assessment and plan of care.    If you have questions, please do not hesitate to call me. I look forward to following Caprice Gutierrez along with you.    Sincerely,    Tyrese Gallego, [facility]    Enclosure  CC:  No Recipients    If you would like to receive this communication electronically, please contact [email or web address] or [phone] to request more information on LS[facility] Link access.    For providers and/or their staff who would like to refer a patient to [facility], please contact us through our one-stop-shop provider referral line, [facility], at [phone].    If you feel you have received this communication in error or would no longer like to receive these types of communications, please e-mail [email or web address]

## 2020-10-15 ENCOUNTER — OFFICE VISIT (OUTPATIENT)
Dept: OPHTHALMOLOGY | Facility: CLINIC | Age: 75
End: 2020-10-15
Payer: MEDICARE

## 2020-10-15 DIAGNOSIS — H33.321 RETINAL HOLE, RIGHT: Primary | ICD-10-CM

## 2020-10-15 DIAGNOSIS — H43.813 PVD (POSTERIOR VITREOUS DETACHMENT), BOTH EYES: ICD-10-CM

## 2020-10-15 PROCEDURE — 99213 OFFICE O/P EST LOW 20 MIN: CPT | Mod: PBBFAC | Performed by: OPHTHALMOLOGY

## 2020-10-15 PROCEDURE — 92201 PR OPHTHALMOSCOPY, EXT, W/RET DRAW/SCLERAL DEPR, I&R, UNI/BI: ICD-10-PCS | Mod: S$PBB,,, | Performed by: OPHTHALMOLOGY

## 2020-10-15 PROCEDURE — 92201 OPSCPY EXTND RTA DRAW UNI/BI: CPT | Mod: PBBFAC | Performed by: OPHTHALMOLOGY

## 2020-10-15 PROCEDURE — 99999 PR PBB SHADOW E&M-EST. PATIENT-LVL III: ICD-10-PCS | Mod: PBBFAC,,, | Performed by: OPHTHALMOLOGY

## 2020-10-15 PROCEDURE — 92012 PR EYE EXAM, EST PATIENT,INTERMED: ICD-10-PCS | Mod: S$PBB,,, | Performed by: OPHTHALMOLOGY

## 2020-10-15 PROCEDURE — 92201 OPSCPY EXTND RTA DRAW UNI/BI: CPT | Mod: S$PBB,,, | Performed by: OPHTHALMOLOGY

## 2020-10-15 PROCEDURE — 99999 PR PBB SHADOW E&M-EST. PATIENT-LVL III: CPT | Mod: PBBFAC,,, | Performed by: OPHTHALMOLOGY

## 2020-10-15 PROCEDURE — 92012 INTRM OPH EXAM EST PATIENT: CPT | Mod: S$PBB,,, | Performed by: OPHTHALMOLOGY

## 2020-10-15 NOTE — PROGRESS NOTES
"Subjective:       Patient ID: Caprice Gutierrez is a 75 y.o. female      Chief Complaint   Patient presents with    retinal hole right eye    PVD both eyes     History of Present Illness  HPI     DLS 07/17/2020 dr Azul    Late follow up , for DFE OU for "Old " rtinal hole OD     Pt reports floaters still there, no new ones OU   No eye pain  No flashes   Va otherwise good OU    Gtts  None     Ocular Hx   Retinal Hole OD  PVD OU       Last edited by Eamon Azul MD on 10/15/2020  2:43 PM. (History)        Imaging:    OCT WNL OU.  Documentation only    Assessment/Plan:     1. Retinal hole, right  Operculated with pigment surrounding.    Discussed option of retinopexy vs obs.  Pt elects obs    Pathology of PVD, Retinal Tear, Retinal Detachment reviewed in great detail  RD precautions discussed in detail, patient expressed understanding  RTC immediately PRN (especially ANY change flashes, floaters, vision, visual field)      2. PVD (posterior vitreous detachment), both eyes  Symptomatic OD for several months.  Observe    Follow up in about 6 months (around 4/15/2021), or if symptoms worsen or fail to improve, for Comprehensive Examination.   "

## 2020-10-16 ENCOUNTER — TELEPHONE (OUTPATIENT)
Dept: PSYCHIATRY | Facility: CLINIC | Age: 75
End: 2020-10-16

## 2020-10-16 RX ORDER — FLUOXETINE 10 MG/1
10 CAPSULE ORAL DAILY
COMMUNITY
End: 2020-10-23 | Stop reason: ALTCHOICE

## 2020-10-16 NOTE — TELEPHONE ENCOUNTER
Spoke to patient on the phone. Patient reports that she wants to hold off on therapy. She would like to see her psychiatrist first on 10/23/2020. Patient verbalized understanding to call the psychiatry clinic if still interested in scheduling an appointment for therapy.

## 2020-10-16 NOTE — TELEPHONE ENCOUNTER
Attempted to contact patient to schedule an appointment with a therapist. No answer. Left voice message for her to call the psychiatry clinic if she is still interested in therapy.

## 2020-10-16 NOTE — PROGRESS NOTES
I, Wen Hayden MD, [facility], and I agree with the assessment and plan.  Patient with severe anxiety and history of trauma.  She is seeing a psychiatrist, Dr. Rodriguez, for medication management.  She is being referred to a psychologist for long-term therapy to process her trauma.    Time: 5 minutes

## 2020-10-23 ENCOUNTER — OFFICE VISIT (OUTPATIENT)
Dept: PSYCHIATRY | Facility: CLINIC | Age: 75
End: 2020-10-23
Payer: MEDICARE

## 2020-10-23 ENCOUNTER — LAB VISIT (OUTPATIENT)
Dept: LAB | Facility: HOSPITAL | Age: 75
End: 2020-10-23
Attending: PSYCHIATRY & NEUROLOGY
Payer: MEDICARE

## 2020-10-23 VITALS
SYSTOLIC BLOOD PRESSURE: 166 MMHG | WEIGHT: 187.94 LBS | HEART RATE: 94 BPM | BODY MASS INDEX: 31.28 KG/M2 | DIASTOLIC BLOOD PRESSURE: 91 MMHG

## 2020-10-23 DIAGNOSIS — F33.2 MAJOR DEPRESSIVE DISORDER, RECURRENT, SEVERE WITHOUT PSYCHOTIC FEATURES: ICD-10-CM

## 2020-10-23 DIAGNOSIS — F33.2 MAJOR DEPRESSIVE DISORDER, RECURRENT, SEVERE WITHOUT PSYCHOTIC FEATURES: Primary | ICD-10-CM

## 2020-10-23 LAB
ALBUMIN SERPL BCP-MCNC: 3.7 G/DL (ref 3.5–5.2)
ALP SERPL-CCNC: 85 U/L (ref 55–135)
ALT SERPL W/O P-5'-P-CCNC: 15 U/L (ref 10–44)
ANION GAP SERPL CALC-SCNC: 9 MMOL/L (ref 8–16)
AST SERPL-CCNC: 18 U/L (ref 10–40)
BASOPHILS # BLD AUTO: 0.02 K/UL (ref 0–0.2)
BASOPHILS NFR BLD: 0.3 % (ref 0–1.9)
BILIRUB SERPL-MCNC: 0.5 MG/DL (ref 0.1–1)
BUN SERPL-MCNC: 5 MG/DL (ref 8–23)
CALCIUM SERPL-MCNC: 9.4 MG/DL (ref 8.7–10.5)
CHLORIDE SERPL-SCNC: 102 MMOL/L (ref 95–110)
CO2 SERPL-SCNC: 29 MMOL/L (ref 23–29)
CREAT SERPL-MCNC: 0.8 MG/DL (ref 0.5–1.4)
DIFFERENTIAL METHOD: ABNORMAL
EOSINOPHIL # BLD AUTO: 0 K/UL (ref 0–0.5)
EOSINOPHIL NFR BLD: 0.1 % (ref 0–8)
ERYTHROCYTE [DISTWIDTH] IN BLOOD BY AUTOMATED COUNT: 16.7 % (ref 11.5–14.5)
EST. GFR  (AFRICAN AMERICAN): >60 ML/MIN/1.73 M^2
EST. GFR  (NON AFRICAN AMERICAN): >60 ML/MIN/1.73 M^2
GLUCOSE SERPL-MCNC: 115 MG/DL (ref 70–110)
HCT VFR BLD AUTO: 40.2 % (ref 37–48.5)
HGB BLD-MCNC: 13.2 G/DL (ref 12–16)
IMM GRANULOCYTES # BLD AUTO: 0.02 K/UL (ref 0–0.04)
IMM GRANULOCYTES NFR BLD AUTO: 0.3 % (ref 0–0.5)
LYMPHOCYTES # BLD AUTO: 1.4 K/UL (ref 1–4.8)
LYMPHOCYTES NFR BLD: 20.1 % (ref 18–48)
MCH RBC QN AUTO: 28.6 PG (ref 27–31)
MCHC RBC AUTO-ENTMCNC: 32.8 G/DL (ref 32–36)
MCV RBC AUTO: 87 FL (ref 82–98)
MONOCYTES # BLD AUTO: 0.8 K/UL (ref 0.3–1)
MONOCYTES NFR BLD: 12.4 % (ref 4–15)
NEUTROPHILS # BLD AUTO: 4.5 K/UL (ref 1.8–7.7)
NEUTROPHILS NFR BLD: 66.8 % (ref 38–73)
NRBC BLD-RTO: 0 /100 WBC
PLATELET # BLD AUTO: 327 K/UL (ref 150–350)
PMV BLD AUTO: 10.3 FL (ref 9.2–12.9)
POTASSIUM SERPL-SCNC: 3.9 MMOL/L (ref 3.5–5.1)
PROT SERPL-MCNC: 7.2 G/DL (ref 6–8.4)
RBC # BLD AUTO: 4.62 M/UL (ref 4–5.4)
SODIUM SERPL-SCNC: 140 MMOL/L (ref 136–145)
T3 SERPL-MCNC: 84 NG/DL (ref 60–180)
T4 FREE SERPL-MCNC: 0.99 NG/DL (ref 0.71–1.51)
TSH SERPL DL<=0.005 MIU/L-ACNC: 0.79 UIU/ML (ref 0.4–4)
WBC # BLD AUTO: 6.71 K/UL (ref 3.9–12.7)

## 2020-10-23 PROCEDURE — 99999 PR PBB SHADOW E&M-EST. PATIENT-LVL III: ICD-10-PCS | Mod: PBBFAC,,, | Performed by: PSYCHIATRY & NEUROLOGY

## 2020-10-23 PROCEDURE — 84443 ASSAY THYROID STIM HORMONE: CPT

## 2020-10-23 PROCEDURE — 85025 COMPLETE CBC W/AUTO DIFF WBC: CPT

## 2020-10-23 PROCEDURE — 80053 COMPREHEN METABOLIC PANEL: CPT

## 2020-10-23 PROCEDURE — 36415 COLL VENOUS BLD VENIPUNCTURE: CPT

## 2020-10-23 PROCEDURE — 99213 OFFICE O/P EST LOW 20 MIN: CPT | Mod: PBBFAC | Performed by: PSYCHIATRY & NEUROLOGY

## 2020-10-23 PROCEDURE — 99999 PR PBB SHADOW E&M-EST. PATIENT-LVL III: CPT | Mod: PBBFAC,,, | Performed by: PSYCHIATRY & NEUROLOGY

## 2020-10-23 PROCEDURE — 84480 ASSAY TRIIODOTHYRONINE (T3): CPT

## 2020-10-23 PROCEDURE — 84439 ASSAY OF FREE THYROXINE: CPT

## 2020-10-23 PROCEDURE — 99205 OFFICE O/P NEW HI 60 MIN: CPT | Mod: S$PBB,,, | Performed by: PSYCHIATRY & NEUROLOGY

## 2020-10-23 PROCEDURE — 99205 PR OFFICE/OUTPT VISIT, NEW, LEVL V, 60-74 MIN: ICD-10-PCS | Mod: S$PBB,,, | Performed by: PSYCHIATRY & NEUROLOGY

## 2020-10-23 RX ORDER — MIRTAZAPINE 30 MG/1
30 TABLET, FILM COATED ORAL NIGHTLY
Qty: 30 TABLET | Refills: 1 | Status: SHIPPED | OUTPATIENT
Start: 2020-10-23 | End: 2020-11-05 | Stop reason: SDUPTHER

## 2020-10-23 RX ORDER — DESVENLAFAXINE SUCCINATE 50 MG/1
50 TABLET, EXTENDED RELEASE ORAL DAILY
Qty: 30 TABLET | Refills: 1 | Status: SHIPPED | OUTPATIENT
Start: 2020-10-23 | End: 2020-11-05 | Stop reason: SDUPTHER

## 2020-10-23 NOTE — Clinical Note
Dr. Garcia -- Thank you for referring this nice lady.  Note BP.  Lab pending.    Doug -- please schedule Pt for 30 min f/u in 2 weeks.

## 2020-10-26 ENCOUNTER — TELEPHONE (OUTPATIENT)
Dept: PSYCHIATRY | Facility: CLINIC | Age: 75
End: 2020-10-26

## 2020-10-26 NOTE — TELEPHONE ENCOUNTER
Attempted to contact patient to schedule a follow up appointment in the psychiatry clinic. No answer. Left voice message for patient to call the clinic back.
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
## 2020-11-05 ENCOUNTER — OFFICE VISIT (OUTPATIENT)
Dept: PSYCHIATRY | Facility: CLINIC | Age: 75
End: 2020-11-05
Payer: MEDICARE

## 2020-11-05 DIAGNOSIS — F33.41 MAJOR DEPRESSIVE DISORDER, RECURRENT EPISODE, IN PARTIAL REMISSION: Primary | ICD-10-CM

## 2020-11-05 PROCEDURE — 99212 OFFICE O/P EST SF 10 MIN: CPT | Mod: PBBFAC | Performed by: PSYCHIATRY & NEUROLOGY

## 2020-11-05 PROCEDURE — 99999 PR PBB SHADOW E&M-EST. PATIENT-LVL II: ICD-10-PCS | Mod: PBBFAC,,, | Performed by: PSYCHIATRY & NEUROLOGY

## 2020-11-05 PROCEDURE — 90833 PSYTX W PT W E/M 30 MIN: CPT | Mod: S$PBB,,, | Performed by: PSYCHIATRY & NEUROLOGY

## 2020-11-05 PROCEDURE — 90833 PR PSYCHOTHERAPY W/PATIENT W/E&M, 30 MIN (ADD ON): ICD-10-PCS | Mod: S$PBB,,, | Performed by: PSYCHIATRY & NEUROLOGY

## 2020-11-05 PROCEDURE — 99999 PR PBB SHADOW E&M-EST. PATIENT-LVL II: CPT | Mod: PBBFAC,,, | Performed by: PSYCHIATRY & NEUROLOGY

## 2020-11-05 PROCEDURE — 99213 OFFICE O/P EST LOW 20 MIN: CPT | Mod: S$PBB,,, | Performed by: PSYCHIATRY & NEUROLOGY

## 2020-11-05 PROCEDURE — 99213 PR OFFICE/OUTPT VISIT, EST, LEVL III, 20-29 MIN: ICD-10-PCS | Mod: S$PBB,,, | Performed by: PSYCHIATRY & NEUROLOGY

## 2020-11-05 RX ORDER — MIRTAZAPINE 30 MG/1
30 TABLET, FILM COATED ORAL NIGHTLY
Qty: 90 TABLET | Refills: 1 | Status: SHIPPED | OUTPATIENT
Start: 2020-11-05 | End: 2021-01-19 | Stop reason: SDUPTHER

## 2020-11-05 RX ORDER — DESVENLAFAXINE SUCCINATE 50 MG/1
50 TABLET, EXTENDED RELEASE ORAL DAILY
Qty: 90 TABLET | Refills: 1 | Status: SHIPPED | OUTPATIENT
Start: 2020-11-05 | End: 2021-01-19 | Stop reason: SDUPTHER

## 2020-11-05 NOTE — PROGRESS NOTES
Outpatient Psychiatry Follow-Up Visit (MD/NP)    11/5/2020    Clinical Status of Patient:  Outpatient (Ambulatory)    Chief Complaint:  Caprice Gutierrez is a 75 y.o. female who presents today for follow-up of depression.  Met with patient.      Interval History and Content of Current Session:  Interim Events/Subjective Report/Content of Current Session:  Pt was initially evaluated on 10/23/20 for worsening depression of 7 months' duration.  She returned today neatly dressed and groomed, wearing a mask due to COVID-19 isolation.  She reported that sleep improved the first night following med changes 2 weeks ago.  Her mood began to improve shortly afterward.  Today, she reports that she is again euthymic, with good sleep and appetite, and enthusiasm for cooking and other activities.  She is still wisely curtailing social interaction until a COVID-19 vaccine is available.    Medications were discussed.  Pt denied any adverse effects except for a single episode of restless legs one morning.  This never returned.  BP taken at home was normal.  Lab results were reviewed, including slight elevation of glucose.  Pt agreed with plan to continue current medications, monitor BP monthly, and return in 3 months.  She understands to call if new problems arise.  She was also reminded of her previous hypomania on Prozac and was asked to call if this ever happens again.    Psychotherapy:  · Target symptoms: depression  · Why chosen therapy is appropriate versus another modality: relevant to diagnosis, patient responds to this modality, evidence based practice  · Outcome monitoring methods: self-report, observation  · Therapeutic intervention type: behavior modifying psychotherapy, supportive psychotherapy  · Topics discussed/themes: building skills sets for symptom management, symptom recognition  · The patient's response to the intervention is motivated. The patient's progress toward treatment goals is good.   · Duration of  intervention: 18 minutes.    Review of Systems   · PSYCHIATRIC: Pertinant items are noted in the narrative.    Past Medical, Family and Social History: The patient's past medical, family and social history have been reviewed and updated as appropriate within the electronic medical record - see encounter notes.    Compliance: yes    Side effects: None    Risk Parameters:  Patient reports no suicidal ideation  Patient reports no homicidal ideation  Patient reports no self-injurious behavior  Patient reports no violent behavior    Exam (detailed: at least 9 elements; comprehensive: all 15 elements)   Constitutional  Vitals:  Most recent vital signs, dated less than 90 days prior to this appointment, were reviewed.   There were no vitals filed for this visit.     General:  age appropriate, well nourished, neatly groomed     Musculoskeletal  Muscle Strength/Tone:  no dyskinesia, no dystonia, no tremor   Gait & Station:  non-ataxic     Psychiatric  Speech:  no latency; no press, spontaneous   Mood & Affect:  euthymic  congruent and appropriate   Thought Process:  goal-directed, logical   Associations:  intact   Thought Content:  normal, no suicidality, no homicidality, delusions, or paranoia   Insight:  has awareness of illness   Judgement: behavior is adequate to circumstances   Orientation:  grossly intact   Memory: intact for content of interview   Language: grossly intact   Attention Span & Concentration:  able to focus   Fund of Knowledge:  intact and appropriate to age and level of education     Assessment and Diagnosis   Status/Progress: Based on the examination today, the patient's problem(s) is/are improved.  New problems have not been presented today.   Co-morbidities are not complicating management of the primary condition.  There are no active rule-out diagnoses for this patient at this time.     General Impression:  Pt returned with depression in remission and no identified adverse effects.      ICD-10-CM  ICD-9-CM   1. Major depressive disorder, recurrent episode, in partial remission  F33.41 296.35       Intervention/Counseling/Treatment Plan   · Medication Management: Continue current medications.      Return to Clinic: 3 months

## 2020-11-18 ENCOUNTER — PES CALL (OUTPATIENT)
Dept: ADMINISTRATIVE | Facility: CLINIC | Age: 75
End: 2020-11-18

## 2020-11-18 ENCOUNTER — OFFICE VISIT (OUTPATIENT)
Dept: INTERNAL MEDICINE | Facility: CLINIC | Age: 75
End: 2020-11-18
Payer: MEDICARE

## 2020-11-18 VITALS
HEART RATE: 84 BPM | SYSTOLIC BLOOD PRESSURE: 140 MMHG | WEIGHT: 197.56 LBS | BODY MASS INDEX: 32.91 KG/M2 | DIASTOLIC BLOOD PRESSURE: 88 MMHG | OXYGEN SATURATION: 96 % | HEIGHT: 65 IN

## 2020-11-18 DIAGNOSIS — I10 HYPERTENSION, ESSENTIAL: Primary | ICD-10-CM

## 2020-11-18 DIAGNOSIS — F32.5 MAJOR DEPRESSIVE DISORDER WITH SINGLE EPISODE, IN FULL REMISSION: ICD-10-CM

## 2020-11-18 PROCEDURE — 99214 OFFICE O/P EST MOD 30 MIN: CPT | Mod: PBBFAC | Performed by: INTERNAL MEDICINE

## 2020-11-18 PROCEDURE — 99213 OFFICE O/P EST LOW 20 MIN: CPT | Mod: S$PBB,,, | Performed by: INTERNAL MEDICINE

## 2020-11-18 PROCEDURE — 99213 PR OFFICE/OUTPT VISIT, EST, LEVL III, 20-29 MIN: ICD-10-PCS | Mod: S$PBB,,, | Performed by: INTERNAL MEDICINE

## 2020-11-18 PROCEDURE — 99999 PR PBB SHADOW E&M-EST. PATIENT-LVL IV: ICD-10-PCS | Mod: PBBFAC,,, | Performed by: INTERNAL MEDICINE

## 2020-11-18 PROCEDURE — 99999 PR PBB SHADOW E&M-EST. PATIENT-LVL IV: CPT | Mod: PBBFAC,,, | Performed by: INTERNAL MEDICINE

## 2020-11-18 RX ORDER — HYDROCHLOROTHIAZIDE 25 MG/1
25 TABLET ORAL DAILY
Qty: 90 TABLET | Refills: 3 | Status: SHIPPED | OUTPATIENT
Start: 2020-11-18 | End: 2021-08-19

## 2020-11-18 NOTE — PROGRESS NOTES
Subjective:       Patient ID: Caprice Gutierrez is a 75 y.o. female.    Chief Complaint: Follow-up             Depression, anxiety, hypertension.  HPI   Doing much better with pristiq and remeron.    Sleeping well.  Back engaged with family.       Depression and anxiety are much improved.  Sleeping well.    Htn controlled at home, but elevated here.  She stopped hctz in past when her depression was uncontrolled and she had many somatic symptoms.           Review of Systems   Constitutional: Negative for fever and unexpected weight change.   HENT: Negative for nasal congestion and postnasal drip.    Eyes: Negative for pain, discharge and visual disturbance.   Respiratory: Negative for cough, chest tightness, shortness of breath and wheezing.    Cardiovascular: Negative for chest pain and leg swelling.   Gastrointestinal: Negative for abdominal pain, constipation, diarrhea and nausea.   Genitourinary: Negative for difficulty urinating, dysuria and hematuria.   Integumentary:  Negative for rash.   Neurological: Negative for headaches.   Psychiatric/Behavioral: Negative for dysphoric mood and sleep disturbance. The patient is not nervous/anxious.          Objective:      Physical Exam  Constitutional:       Appearance: Normal appearance. She is obese. She is not ill-appearing or diaphoretic.   Neurological:      General: No focal deficit present.      Mental Status: She is alert and oriented to person, place, and time.   Psychiatric:         Mood and Affect: Mood normal.         Behavior: Behavior normal.         Thought Content: Thought content normal.         Judgment: Judgment normal.         166/90  Results for orders placed or performed in visit on 10/23/20   Comprehensive Metabolic Panel   Result Value Ref Range    Sodium 140 136 - 145 mmol/L    Potassium 3.9 3.5 - 5.1 mmol/L    Chloride 102 95 - 110 mmol/L    CO2 29 23 - 29 mmol/L    Glucose 115 (H) 70 - 110 mg/dL    BUN 5 (L) 8 - 23 mg/dL    Creatinine 0.8  0.5 - 1.4 mg/dL    Calcium 9.4 8.7 - 10.5 mg/dL    Total Protein 7.2 6.0 - 8.4 g/dL    Albumin 3.7 3.5 - 5.2 g/dL    Total Bilirubin 0.5 0.1 - 1.0 mg/dL    Alkaline Phosphatase 85 55 - 135 U/L    AST 18 10 - 40 U/L    ALT 15 10 - 44 U/L    Anion Gap 9 8 - 16 mmol/L    eGFR if African American >60.0 >60 mL/min/1.73 m^2    eGFR if non African American >60.0 >60 mL/min/1.73 m^2   CBC auto differential   Result Value Ref Range    WBC 6.71 3.90 - 12.70 K/uL    RBC 4.62 4.00 - 5.40 M/uL    Hemoglobin 13.2 12.0 - 16.0 g/dL    Hematocrit 40.2 37.0 - 48.5 %    MCV 87 82 - 98 fL    MCH 28.6 27.0 - 31.0 pg    MCHC 32.8 32.0 - 36.0 g/dL    RDW 16.7 (H) 11.5 - 14.5 %    Platelets 327 150 - 350 K/uL    MPV 10.3 9.2 - 12.9 fL    Immature Granulocytes 0.3 0.0 - 0.5 %    Gran # (ANC) 4.5 1.8 - 7.7 K/uL    Immature Grans (Abs) 0.02 0.00 - 0.04 K/uL    Lymph # 1.4 1.0 - 4.8 K/uL    Mono # 0.8 0.3 - 1.0 K/uL    Eos # 0.0 0.0 - 0.5 K/uL    Baso # 0.02 0.00 - 0.20 K/uL    nRBC 0 0 /100 WBC    Gran % 66.8 38.0 - 73.0 %    Lymph % 20.1 18.0 - 48.0 %    Mono % 12.4 4.0 - 15.0 %    Eosinophil % 0.1 0.0 - 8.0 %    Basophil % 0.3 0.0 - 1.9 %    Differential Method Automated    T4, Free   Result Value Ref Range    Free T4 0.99 0.71 - 1.51 ng/dL   TSH   Result Value Ref Range    TSH 0.789 0.400 - 4.000 uIU/mL   T3   Result Value Ref Range    T3, Total 84 60 - 180 ng/dL     Assessment:       1. Hypertension, essential    2. Major depressive disorder with single episode, in full remission        Plan:       Caprice was seen today for follow-up.    Diagnoses and all orders for this visit:    Hypertension, essential    Major depressive disorder with single episode, in full remission    Other orders  -     hydroCHLOROthiazide (HYDRODIURIL) 25 MG tablet; Take 1 tablet (25 mg total) by mouth once daily.         Add hctz.- half- 1 tab daily.    BP check 1 mo Shandra.    She declines Flu vax.  Schedule MG

## 2020-12-10 ENCOUNTER — HOSPITAL ENCOUNTER (OUTPATIENT)
Dept: RADIOLOGY | Facility: HOSPITAL | Age: 75
Discharge: HOME | End: 2020-12-10
Attending: INTERNAL MEDICINE
Payer: MEDICARE

## 2020-12-10 DIAGNOSIS — Z12.31 ENCOUNTER FOR SCREENING MAMMOGRAM FOR MALIGNANT NEOPLASM OF BREAST: ICD-10-CM

## 2020-12-10 PROCEDURE — 77067 SCR MAMMO BI INCL CAD: CPT | Mod: TC,PO

## 2020-12-11 ENCOUNTER — PATIENT MESSAGE (OUTPATIENT)
Dept: OTHER | Facility: OTHER | Age: 75
End: 2020-12-11

## 2020-12-17 ENCOUNTER — OFFICE VISIT (OUTPATIENT)
Dept: INTERNAL MEDICINE | Facility: CLINIC | Age: 75
End: 2020-12-17
Payer: MEDICARE

## 2020-12-17 VITALS
HEIGHT: 65 IN | OXYGEN SATURATION: 96 % | HEART RATE: 79 BPM | DIASTOLIC BLOOD PRESSURE: 77 MMHG | BODY MASS INDEX: 33.91 KG/M2 | WEIGHT: 203.5 LBS | SYSTOLIC BLOOD PRESSURE: 127 MMHG

## 2020-12-17 DIAGNOSIS — F32.A ANXIETY AND DEPRESSION: ICD-10-CM

## 2020-12-17 DIAGNOSIS — I10 HYPERTENSION, ESSENTIAL: Primary | ICD-10-CM

## 2020-12-17 DIAGNOSIS — F41.9 ANXIETY AND DEPRESSION: ICD-10-CM

## 2020-12-17 DIAGNOSIS — E78.5 HYPERLIPIDEMIA, UNSPECIFIED HYPERLIPIDEMIA TYPE: ICD-10-CM

## 2020-12-17 PROCEDURE — 99999 PR PBB SHADOW E&M-EST. PATIENT-LVL IV: ICD-10-PCS | Mod: PBBFAC,,, | Performed by: PHYSICIAN ASSISTANT

## 2020-12-17 PROCEDURE — 99214 OFFICE O/P EST MOD 30 MIN: CPT | Mod: PBBFAC | Performed by: PHYSICIAN ASSISTANT

## 2020-12-17 PROCEDURE — 99999 PR PBB SHADOW E&M-EST. PATIENT-LVL IV: CPT | Mod: PBBFAC,,, | Performed by: PHYSICIAN ASSISTANT

## 2020-12-17 PROCEDURE — 99214 OFFICE O/P EST MOD 30 MIN: CPT | Mod: S$PBB,,, | Performed by: PHYSICIAN ASSISTANT

## 2020-12-17 PROCEDURE — 99214 PR OFFICE/OUTPT VISIT, EST, LEVL IV, 30-39 MIN: ICD-10-PCS | Mod: S$PBB,,, | Performed by: PHYSICIAN ASSISTANT

## 2020-12-17 NOTE — PROGRESS NOTES
Subjective:       Patient ID: Caprice Gutierrez is a 75 y.o. female.        Chief Complaint: Blood Pressure Check    Caprice Gutierrez is an established patient of Nayely Garcia MD here today for follow up visit.    HTN - back taking hctz 25 mg daily and irbesartan 300 mg daily, realizes that prior issues with meds was likely due to her uncontrolled anxiety and depression    8/10/2020 - coreg stopped, started on irbesartan  Home /79, 125/79, 103/72, 126/86, 126/83, 133/85, 135/87, 141/88, 130/84, 122/91, 121/76, 131/85, 132/82, 128/81, 145/87, 126/75  Perseverating on BP and checking constantly, fuels her anxiety, BP of late has had a very stable pattern since starting irbesartan  Home cuff checked today and accurate  10/29/19  Last visit decided to continue olmesartan 20 mg daily, carvedilol 25 mg BID  Added hctz 12.5 mg secondary to ankle swelling  Here today and she has stopped olmesartan as it was causing leg cramps, reports that upon stopping the olmesartan the leg cramps have resolved  For the past 2 weeks she has been taking carvedilol 25 mg BID and hctz 12.5 mg with no ill effects and acceptable BP at home, in office BP is much better compared to last visit although still not quite at goal   10/10/19  Previously on olmesartan hct 40-25 mg daily but had two ED trips for generalized weakness that she attributed to dehydration from hctz, saw Dr. Garcia in f/u 8/2019 and olmesartan 40-25 mg stopped and instead started on just olmesartan 20 mg daily, previously on valsartan that caused dizziness, continued on carvedilol 25 mg BID, since stopping hctz 25 mg some fluid retention in ankles that bothers her    Anxiety/depression/insomnia - she is in a MUCH better place since seeing Dr. Rodriguez and starting pristiq and remeron, feeling much better, back to cooking and driving     Vitamin d def - taking vitamin d 50,000 U weekly, vitamin d level 43 11/2018     Lipids - taking pravastatin 40 mg  daily  Lab Results       Component                Value               Date                       CHOL                         194                 09/03/2019                 TRIG                            61                  09/03/2019                 HDL                             67                  09/03/2019                 LDLCALC                  114.8               09/03/2019               Prediabetes -  Lab Results       Component                Value               Date                       HGBA1C                   5.8 (H)             06/12/2020                 HGBA1C                   5.9 (H)             09/03/2019                 HGBA1C                   5.7 (H)             11/14/2018               Aortic atherosclerosis          Review of Systems   Constitutional: Negative for chills, diaphoresis, fatigue and fever.   HENT: Negative for congestion and sore throat.    Eyes: Negative for visual disturbance.   Respiratory: Negative for cough, chest tightness and shortness of breath.    Cardiovascular: Negative for chest pain, palpitations and leg swelling.   Gastrointestinal: Negative for abdominal pain, blood in stool, constipation, diarrhea, nausea and vomiting.   Genitourinary: Negative for dysuria, frequency, hematuria and urgency.   Musculoskeletal: Negative for arthralgias and back pain.   Skin: Negative for rash.   Neurological: Negative for dizziness, syncope, weakness and headaches.   Psychiatric/Behavioral: Negative for dysphoric mood and sleep disturbance. The patient is not nervous/anxious.        Objective:      Physical Exam  Vitals signs and nursing note reviewed.   Constitutional:       Appearance: Normal appearance. She is well-developed.   HENT:      Head: Normocephalic.      Right Ear: External ear normal.      Left Ear: External ear normal.   Eyes:      Pupils: Pupils are equal, round, and reactive to light.   Cardiovascular:      Rate and Rhythm: Normal rate and regular rhythm.       "Heart sounds: Normal heart sounds. No murmur. No friction rub. No gallop.    Pulmonary:      Effort: Pulmonary effort is normal. No respiratory distress.      Breath sounds: Normal breath sounds.   Abdominal:      Palpations: Abdomen is soft.      Tenderness: There is no abdominal tenderness.   Skin:     General: Skin is warm and dry.   Neurological:      Mental Status: She is alert.         Assessment:       1. Hypertension, essential    2. Hyperlipidemia, unspecified hyperlipidemia type    3. Anxiety and depression        Plan:       Caprice was seen today for blood pressure check.    Diagnoses and all orders for this visit:    Hypertension, essential - stable and controlled per home readings, home cuff checked and accurate    Hyperlipidemia, unspecified hyperlipidemia type - stable and controlled    Anxiety and depression - doing so much better!    Pt has been given instructions populated from [employer] database and has verbalized understanding of the after visit summary and information contained wherein.    Follow up with a primary care provider. May go to ER for acute shortness of breath, lightheadedness, fever, or any other emergent complaints or changes in condition.    "This note will be shared with the patient"    Future Appointments   Date Time Provider Department Center   1/19/2021  1:30 PM Ruel Rodriguez Jr., MD [facility]   2/3/2021  1:00 PM Ruel Rodriguez Jr., MD [facility]                 "

## 2020-12-21 ENCOUNTER — PES CALL (OUTPATIENT)
Dept: ADMINISTRATIVE | Facility: CLINIC | Age: 75
End: 2020-12-21

## 2021-01-19 ENCOUNTER — OFFICE VISIT (OUTPATIENT)
Dept: PSYCHIATRY | Facility: CLINIC | Age: 76
End: 2021-01-19
Payer: MEDICARE

## 2021-01-19 VITALS
WEIGHT: 211.06 LBS | DIASTOLIC BLOOD PRESSURE: 72 MMHG | BODY MASS INDEX: 35.13 KG/M2 | SYSTOLIC BLOOD PRESSURE: 139 MMHG | HEART RATE: 75 BPM

## 2021-01-19 DIAGNOSIS — F33.41 MAJOR DEPRESSIVE DISORDER, RECURRENT EPISODE, IN PARTIAL REMISSION: Primary | ICD-10-CM

## 2021-01-19 PROCEDURE — 99999 PR PBB SHADOW E&M-EST. PATIENT-LVL III: CPT | Mod: PBBFAC,,, | Performed by: PSYCHIATRY & NEUROLOGY

## 2021-01-19 PROCEDURE — 99213 OFFICE O/P EST LOW 20 MIN: CPT | Mod: S$PBB,,, | Performed by: PSYCHIATRY & NEUROLOGY

## 2021-01-19 PROCEDURE — 99213 OFFICE O/P EST LOW 20 MIN: CPT | Mod: PBBFAC | Performed by: PSYCHIATRY & NEUROLOGY

## 2021-01-19 PROCEDURE — 99213 PR OFFICE/OUTPT VISIT, EST, LEVL III, 20-29 MIN: ICD-10-PCS | Mod: S$PBB,,, | Performed by: PSYCHIATRY & NEUROLOGY

## 2021-01-19 PROCEDURE — 99999 PR PBB SHADOW E&M-EST. PATIENT-LVL III: ICD-10-PCS | Mod: PBBFAC,,, | Performed by: PSYCHIATRY & NEUROLOGY

## 2021-01-19 RX ORDER — DESVENLAFAXINE SUCCINATE 50 MG/1
50 TABLET, EXTENDED RELEASE ORAL DAILY
Qty: 90 TABLET | Refills: 1 | Status: SHIPPED | OUTPATIENT
Start: 2021-01-19 | End: 2021-07-13 | Stop reason: SDUPTHER

## 2021-01-19 RX ORDER — MIRTAZAPINE 30 MG/1
30 TABLET, FILM COATED ORAL NIGHTLY
Qty: 90 TABLET | Refills: 1 | Status: SHIPPED | OUTPATIENT
Start: 2021-01-19 | End: 2021-07-13 | Stop reason: SDUPTHER

## 2021-03-31 ENCOUNTER — PATIENT OUTREACH (OUTPATIENT)
Dept: ADMINISTRATIVE | Facility: OTHER | Age: 76
End: 2021-03-31

## 2021-04-05 ENCOUNTER — OFFICE VISIT (OUTPATIENT)
Dept: OPHTHALMOLOGY | Facility: CLINIC | Age: 76
End: 2021-04-05
Payer: MEDICARE

## 2021-04-05 DIAGNOSIS — H43.813 PVD (POSTERIOR VITREOUS DETACHMENT), BOTH EYES: ICD-10-CM

## 2021-04-05 DIAGNOSIS — H33.321 RETINAL HOLE, RIGHT: Primary | ICD-10-CM

## 2021-04-05 PROCEDURE — 92014 PR EYE EXAM, EST PATIENT,COMPREHESV: ICD-10-PCS | Mod: S$PBB,,, | Performed by: OPHTHALMOLOGY

## 2021-04-05 PROCEDURE — 92201 OPSCPY EXTND RTA DRAW UNI/BI: CPT | Mod: S$PBB,,, | Performed by: OPHTHALMOLOGY

## 2021-04-05 PROCEDURE — 92201 PR OPHTHALMOSCOPY, EXT, W/RET DRAW/SCLERAL DEPR, I&R, UNI/BI: ICD-10-PCS | Mod: S$PBB,,, | Performed by: OPHTHALMOLOGY

## 2021-04-05 PROCEDURE — 99999 PR PBB SHADOW E&M-EST. PATIENT-LVL III: ICD-10-PCS | Mod: PBBFAC,,, | Performed by: OPHTHALMOLOGY

## 2021-04-05 PROCEDURE — 99213 OFFICE O/P EST LOW 20 MIN: CPT | Mod: PBBFAC | Performed by: OPHTHALMOLOGY

## 2021-04-05 PROCEDURE — 92201 OPSCPY EXTND RTA DRAW UNI/BI: CPT | Mod: PBBFAC | Performed by: OPHTHALMOLOGY

## 2021-04-05 PROCEDURE — 92014 COMPRE OPH EXAM EST PT 1/>: CPT | Mod: S$PBB,,, | Performed by: OPHTHALMOLOGY

## 2021-04-05 PROCEDURE — 99999 PR PBB SHADOW E&M-EST. PATIENT-LVL III: CPT | Mod: PBBFAC,,, | Performed by: OPHTHALMOLOGY

## 2021-04-20 RX ORDER — ERGOCALCIFEROL 1.25 MG/1
CAPSULE ORAL
Qty: 4 CAPSULE | Refills: 11 | Status: SHIPPED | OUTPATIENT
Start: 2021-04-20 | End: 2022-05-27

## 2021-04-28 ENCOUNTER — PES CALL (OUTPATIENT)
Dept: ADMINISTRATIVE | Facility: CLINIC | Age: 76
End: 2021-04-28

## 2021-05-13 RX ORDER — IRBESARTAN 300 MG/1
TABLET ORAL
Qty: 90 TABLET | Refills: 1 | Status: SHIPPED | OUTPATIENT
Start: 2021-05-13 | End: 2021-11-22

## 2021-05-31 ENCOUNTER — PES CALL (OUTPATIENT)
Dept: ADMINISTRATIVE | Facility: CLINIC | Age: 76
End: 2021-05-31

## 2021-06-29 ENCOUNTER — PES CALL (OUTPATIENT)
Dept: ADMINISTRATIVE | Facility: CLINIC | Age: 76
End: 2021-06-29

## 2021-07-13 ENCOUNTER — OFFICE VISIT (OUTPATIENT)
Dept: PSYCHIATRY | Facility: CLINIC | Age: 76
End: 2021-07-13
Payer: MEDICARE

## 2021-07-13 VITALS
DIASTOLIC BLOOD PRESSURE: 96 MMHG | HEIGHT: 65 IN | WEIGHT: 221.56 LBS | BODY MASS INDEX: 36.91 KG/M2 | SYSTOLIC BLOOD PRESSURE: 148 MMHG | HEART RATE: 78 BPM

## 2021-07-13 DIAGNOSIS — F33.42 MAJOR DEPRESSIVE DISORDER, RECURRENT EPISODE, IN FULL REMISSION: Primary | ICD-10-CM

## 2021-07-13 PROCEDURE — 99999 PR PBB SHADOW E&M-EST. PATIENT-LVL III: ICD-10-PCS | Mod: PBBFAC,,, | Performed by: PSYCHIATRY & NEUROLOGY

## 2021-07-13 PROCEDURE — 99999 PR PBB SHADOW E&M-EST. PATIENT-LVL III: CPT | Mod: PBBFAC,,, | Performed by: PSYCHIATRY & NEUROLOGY

## 2021-07-13 PROCEDURE — 99213 OFFICE O/P EST LOW 20 MIN: CPT | Mod: S$PBB,,, | Performed by: PSYCHIATRY & NEUROLOGY

## 2021-07-13 PROCEDURE — 99213 OFFICE O/P EST LOW 20 MIN: CPT | Mod: PBBFAC | Performed by: PSYCHIATRY & NEUROLOGY

## 2021-07-13 PROCEDURE — 99213 PR OFFICE/OUTPT VISIT, EST, LEVL III, 20-29 MIN: ICD-10-PCS | Mod: S$PBB,,, | Performed by: PSYCHIATRY & NEUROLOGY

## 2021-07-13 RX ORDER — MIRTAZAPINE 30 MG/1
30 TABLET, FILM COATED ORAL NIGHTLY
Qty: 90 TABLET | Refills: 1 | Status: SHIPPED | OUTPATIENT
Start: 2021-07-13 | End: 2022-03-02 | Stop reason: SDUPTHER

## 2021-07-13 RX ORDER — DESVENLAFAXINE SUCCINATE 50 MG/1
50 TABLET, EXTENDED RELEASE ORAL DAILY
Qty: 90 TABLET | Refills: 1 | Status: SHIPPED | OUTPATIENT
Start: 2021-07-13 | End: 2022-03-02 | Stop reason: SDUPTHER

## 2021-08-17 ENCOUNTER — PES CALL (OUTPATIENT)
Dept: ADMINISTRATIVE | Facility: CLINIC | Age: 76
End: 2021-08-17

## 2021-09-30 ENCOUNTER — PES CALL (OUTPATIENT)
Dept: ADMINISTRATIVE | Facility: CLINIC | Age: 76
End: 2021-09-30

## 2021-10-08 NOTE — TELEPHONE ENCOUNTER
----- Message from Nayely Garcia MD sent at 6/14/2020  5:46 PM CDT -----  Cholesterol a bit high.  Diabetes fine.  Blood count normal. Liver and kidney fine.  She needs appt if still feeling poorly.     Triage Chief Complaint:   Hip Pain (right) and Leg Pain (right)    [place]:  Godfrey Canchola is a [de-identified] y.o. female that presents after she tripped and fell at home while she was using her walker. She fell directly onto her right hip. Denies hitting her head, losing consciousness or suffering other injuries. Complains of pain mostly to the proximal femur and right hip. Pain is severe, aching and worse with movement. Denies any motor or sensory deficits. Denies any headache, neck or back pain, chest or abdominal pain. Patient is not anticoagulated. ROS:  At least 10 systems reviewed and otherwise acutely negative except as in the [address] Past Medical History:   Diagnosis Date    Acid reflux     'no recent issue\"    Arthritis     \"Left Index Finger\"    CHF (congestive heart failure) [address] [address]     per old chart hx of CHF with admission 12/2016 with pulmonary edema    Chronic kidney disease     Sees Dr. Martha Mansfield have one kidney, dr Santiago Rosado told me that in 2011 I think\"    GERD (gastroesophageal reflux disease)     History of blood transfusion 2011 Or 2012    No Reaction To Blood Transfusion Received    Hypertension     ( pt states she is not on any medication for blood pressure)    Hypomagnesemia     Hypothyroidism     Shortness of breath on exertion     SVT (supraventricular tachycardia) ([facility] [address]     per old chart hx of SVT with admission 2016 tx with Adenosine and per notes in 5/2018 hx of SVT- no cardiologist    Teeth missing     Upper And Lower    Wears glasses      Past Surgical History:   Procedure Laterality Date    ABDOMEN SURGERY      DENTAL SURGERY      Teeth Extracted In Past    ENDOSCOPY, COLON, DIAGNOSTIC  2011 Or 2012    EYE SURGERY  ? when    clyde cataract ext    FEMUR FRACTURE SURGERY Left 5/22/2021    FEMUR IM NAIL REGINALDO INSERTION performed by Negro Mancilla MD at [address] [address]    Abdominal Hernia Repair     OTHER SURGICAL HISTORY  05/27/2018    exp lap . converted to exp laporotomy with ventral hernia repair with mesh    OTHER SURGICAL HISTORY  99/10/0510    umbilical scar excision    VENTRAL HERNIA REPAIR  09/16/2016    Robotic laparoscopic     Family History   Problem Relation Age of Onset    Cancer Father         Lung Cancer    Arthritis Mother     Cancer Brother         Skin Cancer    High Cholesterol Brother      Social History     Socioeconomic History    Marital status:      Spouse name: Not on file    Number of children: Not on file    Years of education: Not on file    Highest education level: Not on file   Occupational History    Not on file   Tobacco Use    Smoking status: Never Smoker    Smokeless tobacco: Never Used   Vaping Use    Vaping Use: Never used   Substance and Sexual Activity    Alcohol use: No    Drug use: No    Sexual activity: Never   Other Topics Concern    Not on file   Social History Narrative    Not on file     Social Determinants of Health     Financial Resource Strain:     Difficulty of Paying Living Expenses:    Food Insecurity:     Worried About Running Out of Food in the Last Year:     [address] in the Last Year:    Transportation Needs:     Lack of Transportation (Medical):      Lack of Transportation (Non-Medical):    Physical Activity:     Days of Exercise per Week:     Minutes of Exercise per Session:    Stress:     Feeling of Stress :    Social Connections:     Frequency of Communication with Friends and Family:     Frequency of Social Gatherings with Friends and Family:     Attends Buddhism Services:     Active Member of Clubs or Organizations:     Attends Club or Organization Meetings:     Marital Status:    Intimate Partner Violence:     Fear of Current or Ex-Partner:     Emotionally Abused:     Physically Abused:     Sexually Abused:      Current Facility-Administered Medications   Medication Dose Route Frequency Provider Last Rate Last Admin    0.9 % sodium chloride infusion   IntraVENous Continuous Ana Bernardo MD        fentaNYL (SUBLIMAZE) injection 50 mcg  50 mcg IntraVENous Once Ana Bernardo MD         Current Outpatient Medications   Medication Sig Dispense Refill    cephALEXin (KEFLEX) 500 MG capsule Take 1 capsule by mouth 4 times daily for 7 days 28 capsule 0    doxycycline hyclate (VIBRA-TABS) 100 MG tablet Take 1 tablet by mouth 2 times daily for 10 days Take with food. 20 tablet 0    allopurinol (ZYLOPRIM) 100 MG tablet Take 0.5 tablets by mouth daily 90 tablet 1    nystatin (MYCOSTATIN) 087579 UNIT/GM powder Apply 3 times daily x 10 to 14 days 45 g 0    metoprolol tartrate (LOPRESSOR) 25 MG tablet Take 1 tablet by mouth 2 times daily 180 tablet 1    magnesium oxide (MAG-OX) 400 (241.3 Mg) MG TABS tablet TAKE ONE TABLET BY MOUTH TWICE A  tablet 1    levothyroxine (SYNTHROID) 50 MCG tablet Take 1 tablet by mouth Daily 90 tablet 1    furosemide (LASIX) 20 MG tablet Take 1 tablet by mouth 2 times daily Take 20 mg by mouth 2 times daily 180 tablet 1     No Known Allergies    Nursing Notes Reviewed    Physical Exam:  ED Triage Vitals [10/08/21 0248]   Enc Vitals Group      BP 94/83      Pulse 81      Resp 19      Temp 97.8 °F (36.6 °C)      Temp Source Oral      SpO2 92 %      Weight 230 lb (104.3 kg)      Height 5' 1\" (1.549 m)      Head Circumference       Peak Flow       Pain Score       Pain Loc       Pain Edu? Excl. in [address]? General appearance:  No acute distress. Head: Normocephalic, atraumatic  Face: No bony deformity, midface stable  Skin:  Warm. Dry. No acute wounds  Eye:  Extraocular movements intact. Pupils equal and reactive  Ears, nose, mouth and throat:  Oral mucosa moist  Neck:  Trachea midline. Extremity:  RLE: Externally rotated and shortened extremity with limited range of motion secondary to pain at the hip. Palpable DP pulse.   Sensory distribution of sural/saphenous/tibial/peroneal nerves intact  Patient has circumferential erythema, slight edema and lichenification of the distal legs bilaterally. Back: No midline CTLS tenderness to palpation or step-offs  Heart:  Regular rate and rhythm  Respiratory:  Lungs clear to auscultation bilaterally. Respirations nonlabored. Chest: No tenderness to palpation. No ecchymosis or deformity. Abdominal:  Soft. Nontender. Non distended.      Neurological:  Alert and oriented times 4.  5 out of 5 motor strength and sensation intact to light touch in all extremities    I have reviewed and interpreted all of the currently available lab results from this visit (if applicable):  Results for orders placed or performed during the hospital encounter of 10/08/21   CBC Auto Differential   Result Value Ref Range    WBC 5.8 4.0 - 10.5 K/CU MM    RBC 3.90 (L) 4.2 - 5.4 M/CU MM    Hemoglobin 12.0 (L) 12.5 - 16.0 GM/DL    Hematocrit 38.2 37 - 47 %    MCV 97.9 78 - 100 FL    MCH 30.8 27 - 31 PG    MCHC 31.4 (L) 32.0 - 36.0 %    RDW 17.0 (H) 11.7 - 14.9 %    Platelets 802 212 - 097 K/CU MM    MPV 9.8 7.5 - 11.1 FL   Comprehensive Metabolic Panel w/ Reflex to MG   Result Value Ref Range    Sodium 136 135 - 145 MMOL/L    Potassium 4.8 3.5 - 5.1 MMOL/L    Chloride 104 99 - 110 mMol/L    CO2 19 (L) 21 - 32 MMOL/L    BUN 46 (H) 6 - 23 MG/DL    CREATININE 5.3 (H) 0.6 - 1.1 MG/DL    Glucose 113 (H) 70 - 99 MG/DL    Calcium 9.0 8.3 - 10.6 MG/DL    Albumin 3.0 (L) 3.4 - 5.0 GM/DL    Total Protein 7.7 6.4 - 8.2 GM/DL    Total Bilirubin 0.6 0.0 - 1.0 MG/DL    ALT 9 (L) 10 - 40 U/L    AST 23 15 - 37 IU/L    Alkaline Phosphatase 108 40 - 128 IU/L    GFR Non- 8 (L) >60 mL/min/1.73m2    GFR  9 (L) >60 mL/min/1.73m2    Anion Gap 13 4 - 16      Radiographs (if obtained):  [] The following radiograph was interpreted by myself in the absence of a radiologist:  [x] Radiologist's Report Reviewed:    EKG (if obtained): (All EKG's are interpreted by myself in the absence of a cardiologist)    MDM:  Plan of care is discussed thoroughly with the patient and family if present. If performed, all imaging and lab work also discussed with patient. All relevant prior results and chart reviewed if available. Patient presents as above. Vital signs are normal.  Appears to have isolated right hip injury. She is neurovascularly intact. No evidence of intracranial or cervical trauma at this time. She is given Norco for pain control. She does have what appears to be some cellulitis of her lower extremities as well. States that she has been on oral antibiotics. She recently finished a course of Bactrim and was started on doxycycline and Keflex yesterday. Patient's imaging shows a femoral neck fracture. Labs significant for acute on chronic renal insufficiency. I spoke with Dr. Daniel Pisano of orthopedic surgery who will come and see the patient this morning. Patient was admitted to the hospitalist for further management. She is agreeable with this plan of care. Clinical Impression:  1. Closed displaced fracture of right femoral neck ([address]    2.  TOBY (acute kidney injury) ([address]      (Please note that portions of this note may have been completed with a voice recognition program. Efforts were made to edit the dictations but occasionally words are mis-transcribed.)    MD Demarco Betts MD  10/08/21 0825

## 2021-12-01 ENCOUNTER — TELEPHONE (OUTPATIENT)
Dept: INTERNAL MEDICINE | Facility: CLINIC | Age: 76
End: 2021-12-01

## 2021-12-01 DIAGNOSIS — Z12.31 ENCOUNTER FOR SCREENING MAMMOGRAM FOR BREAST CANCER: Primary | ICD-10-CM

## 2021-12-16 ENCOUNTER — OFFICE VISIT (OUTPATIENT)
Dept: INTERNAL MEDICINE | Facility: CLINIC | Age: 76
End: 2021-12-16
Payer: MEDICARE

## 2021-12-16 VITALS
OXYGEN SATURATION: 98 % | HEART RATE: 87 BPM | WEIGHT: 209 LBS | BODY MASS INDEX: 34.82 KG/M2 | DIASTOLIC BLOOD PRESSURE: 88 MMHG | HEIGHT: 65 IN | SYSTOLIC BLOOD PRESSURE: 144 MMHG

## 2021-12-16 DIAGNOSIS — I10 HYPERTENSION, ESSENTIAL: ICD-10-CM

## 2021-12-16 DIAGNOSIS — E55.9 VITAMIN D DEFICIENCY: ICD-10-CM

## 2021-12-16 DIAGNOSIS — E28.39 ESTROGEN DEFICIENCY: ICD-10-CM

## 2021-12-16 DIAGNOSIS — Z86.010 HISTORY OF ADENOMATOUS POLYP OF COLON: Primary | ICD-10-CM

## 2021-12-16 DIAGNOSIS — E78.5 HYPERLIPIDEMIA, UNSPECIFIED HYPERLIPIDEMIA TYPE: ICD-10-CM

## 2021-12-16 DIAGNOSIS — Z12.31 ENCOUNTER FOR SCREENING MAMMOGRAM FOR MALIGNANT NEOPLASM OF BREAST: ICD-10-CM

## 2021-12-16 DIAGNOSIS — R73.03 PREDIABETES: ICD-10-CM

## 2021-12-16 DIAGNOSIS — R22.9 LOCALIZED SKIN MASS, LUMP, OR SWELLING: ICD-10-CM

## 2021-12-16 PROCEDURE — 99214 PR OFFICE/OUTPT VISIT, EST, LEVL IV, 30-39 MIN: ICD-10-PCS | Mod: S$PBB,,, | Performed by: INTERNAL MEDICINE

## 2021-12-16 PROCEDURE — 99999 PR PBB SHADOW E&M-EST. PATIENT-LVL V: ICD-10-PCS | Mod: PBBFAC,,, | Performed by: INTERNAL MEDICINE

## 2021-12-16 PROCEDURE — 99214 OFFICE O/P EST MOD 30 MIN: CPT | Mod: S$PBB,,, | Performed by: INTERNAL MEDICINE

## 2021-12-16 PROCEDURE — 99215 OFFICE O/P EST HI 40 MIN: CPT | Mod: PBBFAC | Performed by: INTERNAL MEDICINE

## 2021-12-16 PROCEDURE — 99999 PR PBB SHADOW E&M-EST. PATIENT-LVL V: CPT | Mod: PBBFAC,,, | Performed by: INTERNAL MEDICINE

## 2021-12-20 ENCOUNTER — LAB VISIT (OUTPATIENT)
Dept: LAB | Facility: HOSPITAL | Age: 76
End: 2021-12-20
Attending: INTERNAL MEDICINE
Payer: MEDICARE

## 2021-12-20 DIAGNOSIS — E78.5 HYPERLIPIDEMIA, UNSPECIFIED HYPERLIPIDEMIA TYPE: ICD-10-CM

## 2021-12-20 DIAGNOSIS — E55.9 VITAMIN D DEFICIENCY: ICD-10-CM

## 2021-12-20 DIAGNOSIS — I10 HYPERTENSION, ESSENTIAL: ICD-10-CM

## 2021-12-20 DIAGNOSIS — R73.03 PREDIABETES: ICD-10-CM

## 2021-12-20 LAB
ALBUMIN SERPL BCP-MCNC: 3.9 G/DL (ref 3.5–5.2)
ALP SERPL-CCNC: 86 U/L (ref 38–126)
ALT SERPL W/O P-5'-P-CCNC: 24 U/L (ref 10–44)
ANION GAP SERPL CALC-SCNC: 6 MMOL/L (ref 8–16)
AST SERPL-CCNC: 32 U/L (ref 15–46)
BILIRUB SERPL-MCNC: 0.5 MG/DL (ref 0.1–1)
CALCIUM SERPL-MCNC: 8.8 MG/DL (ref 8.7–10.5)
CHLORIDE SERPL-SCNC: 99 MMOL/L (ref 95–110)
CHOLEST SERPL-MCNC: 195 MG/DL (ref 120–199)
CHOLEST/HDLC SERPL: 2.8 {RATIO} (ref 2–5)
CO2 SERPL-SCNC: 34 MMOL/L (ref 23–29)
CREAT SERPL-MCNC: 0.89 MG/DL (ref 0.5–1.4)
ERYTHROCYTE [DISTWIDTH] IN BLOOD BY AUTOMATED COUNT: 15.4 % (ref 11.5–14.5)
EST. GFR  (AFRICAN AMERICAN): >60 ML/MIN/1.73 M^2
EST. GFR  (NON AFRICAN AMERICAN): >60 ML/MIN/1.73 M^2
ESTIMATED AVG GLUCOSE: 126 MG/DL (ref 68–131)
GLUCOSE SERPL-MCNC: 101 MG/DL (ref 70–110)
HBA1C MFR BLD: 6 % (ref 4–5.6)
HCT VFR BLD AUTO: 34.5 % (ref 37–48.5)
HDLC SERPL-MCNC: 70 MG/DL (ref 40–75)
HDLC SERPL: 35.9 % (ref 20–50)
HGB BLD-MCNC: 11.4 G/DL (ref 12–16)
LDLC SERPL CALC-MCNC: 110.2 MG/DL (ref 63–159)
MCH RBC QN AUTO: 28.4 PG (ref 27–31)
MCHC RBC AUTO-ENTMCNC: 33 G/DL (ref 32–36)
MCV RBC AUTO: 86 FL (ref 82–98)
NONHDLC SERPL-MCNC: 125 MG/DL
PLATELET # BLD AUTO: 332 K/UL (ref 150–450)
PMV BLD AUTO: 9.5 FL (ref 9.2–12.9)
POTASSIUM SERPL-SCNC: 3.6 MMOL/L (ref 3.5–5.1)
PROT SERPL-MCNC: 7.4 G/DL (ref 6–8.4)
RBC # BLD AUTO: 4.01 M/UL (ref 4–5.4)
SODIUM SERPL-SCNC: 139 MMOL/L (ref 136–145)
TRIGL SERPL-MCNC: 74 MG/DL (ref 30–150)
UUN UR-MCNC: 13 MG/DL (ref 7–17)
WBC # BLD AUTO: 5.74 K/UL (ref 3.9–12.7)

## 2021-12-20 PROCEDURE — 80061 LIPID PANEL: CPT | Performed by: INTERNAL MEDICINE

## 2021-12-20 PROCEDURE — 80053 COMPREHEN METABOLIC PANEL: CPT | Mod: PO | Performed by: INTERNAL MEDICINE

## 2021-12-20 PROCEDURE — 85027 COMPLETE CBC AUTOMATED: CPT | Mod: PO | Performed by: INTERNAL MEDICINE

## 2021-12-20 PROCEDURE — 36415 COLL VENOUS BLD VENIPUNCTURE: CPT | Mod: PO | Performed by: INTERNAL MEDICINE

## 2021-12-20 PROCEDURE — 82306 VITAMIN D 25 HYDROXY: CPT | Mod: PO | Performed by: INTERNAL MEDICINE

## 2021-12-20 PROCEDURE — 83036 HEMOGLOBIN GLYCOSYLATED A1C: CPT | Performed by: INTERNAL MEDICINE

## 2021-12-21 LAB — 25(OH)D3+25(OH)D2 SERPL-MCNC: 38 NG/ML (ref 30–96)

## 2021-12-26 DIAGNOSIS — D64.9 ANEMIA, UNSPECIFIED TYPE: Primary | ICD-10-CM

## 2021-12-27 ENCOUNTER — TELEPHONE (OUTPATIENT)
Dept: INTERNAL MEDICINE | Facility: CLINIC | Age: 76
End: 2021-12-27

## 2022-01-13 ENCOUNTER — PES CALL (OUTPATIENT)
Dept: ADMINISTRATIVE | Facility: CLINIC | Age: 77
End: 2022-01-13
Payer: MEDICARE

## 2022-01-28 ENCOUNTER — HOSPITAL ENCOUNTER (OUTPATIENT)
Dept: RADIOLOGY | Facility: HOSPITAL | Age: 77
Discharge: HOME | End: 2022-01-28
Attending: INTERNAL MEDICINE
Payer: MEDICARE

## 2022-01-28 DIAGNOSIS — E28.39 ESTROGEN DEFICIENCY: ICD-10-CM

## 2022-01-28 DIAGNOSIS — Z12.31 ENCOUNTER FOR SCREENING MAMMOGRAM FOR MALIGNANT NEOPLASM OF BREAST: ICD-10-CM

## 2022-01-28 PROCEDURE — 77063 BREAST TOMOSYNTHESIS BI: CPT | Mod: TC,PO

## 2022-01-28 PROCEDURE — 77067 SCR MAMMO BI INCL CAD: CPT | Mod: TC,PO

## 2022-01-28 PROCEDURE — 77080 DXA BONE DENSITY AXIAL: CPT | Mod: TC,PO

## 2022-02-15 ENCOUNTER — PES CALL (OUTPATIENT)
Dept: ADMINISTRATIVE | Facility: CLINIC | Age: 77
End: 2022-02-15
Payer: MEDICARE

## 2022-02-21 ENCOUNTER — OFFICE VISIT (OUTPATIENT)
Dept: DERMATOLOGY | Facility: CLINIC | Age: 77
End: 2022-02-21
Payer: MEDICARE

## 2022-02-21 DIAGNOSIS — L72.0 EIC (EPIDERMAL INCLUSION CYST): ICD-10-CM

## 2022-02-21 PROCEDURE — 99999 PR PBB SHADOW E&M-EST. PATIENT-LVL III: CPT | Mod: PBBFAC,,, | Performed by: DERMATOLOGY

## 2022-02-21 PROCEDURE — 99202 PR OFFICE/OUTPT VISIT, NEW, LEVL II, 15-29 MIN: ICD-10-PCS | Mod: S$GLB,,, | Performed by: DERMATOLOGY

## 2022-02-21 PROCEDURE — 1101F PR PT FALLS ASSESS DOC 0-1 FALLS W/OUT INJ PAST YR: ICD-10-PCS | Mod: CPTII,S$GLB,, | Performed by: DERMATOLOGY

## 2022-02-21 PROCEDURE — 1101F PT FALLS ASSESS-DOCD LE1/YR: CPT | Mod: CPTII,S$GLB,, | Performed by: DERMATOLOGY

## 2022-02-21 PROCEDURE — 99999 PR PBB SHADOW E&M-EST. PATIENT-LVL III: ICD-10-PCS | Mod: PBBFAC,,, | Performed by: DERMATOLOGY

## 2022-02-21 PROCEDURE — 1159F MED LIST DOCD IN RCRD: CPT | Mod: CPTII,S$GLB,, | Performed by: DERMATOLOGY

## 2022-02-21 PROCEDURE — 99202 OFFICE O/P NEW SF 15 MIN: CPT | Mod: S$GLB,,, | Performed by: DERMATOLOGY

## 2022-02-21 PROCEDURE — 3288F PR FALLS RISK ASSESSMENT DOCUMENTED: ICD-10-PCS | Mod: CPTII,S$GLB,, | Performed by: DERMATOLOGY

## 2022-02-21 PROCEDURE — 1160F PR REVIEW ALL MEDS BY PRESCRIBER/CLIN PHARMACIST DOCUMENTED: ICD-10-PCS | Mod: CPTII,S$GLB,, | Performed by: DERMATOLOGY

## 2022-02-21 PROCEDURE — 3288F FALL RISK ASSESSMENT DOCD: CPT | Mod: CPTII,S$GLB,, | Performed by: DERMATOLOGY

## 2022-02-21 PROCEDURE — 1159F PR MEDICATION LIST DOCUMENTED IN MEDICAL RECORD: ICD-10-PCS | Mod: CPTII,S$GLB,, | Performed by: DERMATOLOGY

## 2022-02-21 PROCEDURE — 1126F AMNT PAIN NOTED NONE PRSNT: CPT | Mod: CPTII,S$GLB,, | Performed by: DERMATOLOGY

## 2022-02-21 PROCEDURE — 1160F RVW MEDS BY RX/DR IN RCRD: CPT | Mod: CPTII,S$GLB,, | Performed by: DERMATOLOGY

## 2022-02-21 PROCEDURE — 1126F PR PAIN SEVERITY QUANTIFIED, NO PAIN PRESENT: ICD-10-PCS | Mod: CPTII,S$GLB,, | Performed by: DERMATOLOGY

## 2022-02-21 NOTE — PROGRESS NOTES
Subjective:       Patient ID:  Caprice Gutierrez is a 76 y.o. female who presents for   Chief Complaint   Patient presents with    Lesion     Under chin     No h/o nmsc    Patient with new complaint of lesion(s)  Location: under chin  Duration: 2 years  Symptoms: growing  Relieving factors/Previous treatments: none  Tried to pop it out      Review of Systems   Skin: Negative for daily sunscreen use and activity-related sunscreen use.   Hematologic/Lymphatic: Bruises/bleeds easily (bruise).        Objective:    Physical Exam   Constitutional: She appears well-developed and well-nourished. No distress.   Neurological: She is alert and oriented to person, place, and time. She is not disoriented.   Psychiatric: She has a normal mood and affect.   Skin:   Areas Examined (abnormalities noted in diagram):   Head / Face Inspection Performed              Diagram Legend     Erythematous scaling macule/papule c/w actinic keratosis       Vascular papule c/w angioma      Pigmented verrucoid papule/plaque c/w seborrheic keratosis      Yellow umbilicated papule c/w sebaceous hyperplasia      Irregularly shaped tan macule c/w lentigo     1-2 mm smooth white papules consistent with Milia      Movable subcutaneous cyst with punctum c/w epidermal inclusion cyst      Subcutaneous movable cyst c/w pilar cyst      Firm pink to brown papule c/w dermatofibroma      Pedunculated fleshy papule(s) c/w skin tag(s)      Evenly pigmented macule c/w junctional nevus     Mildly variegated pigmented, slightly irregular-bordered macule c/w mildly atypical nevus      Flesh colored to evenly pigmented papule c/w intradermal nevus       Pink pearly papule/plaque c/w basal cell carcinoma      Erythematous hyperkeratotic cursted plaque c/w SCC      Surgical scar with no sign of skin cancer recurrence      Open and closed comedones      Inflammatory papules and pustules      Verrucoid papule consistent consistent with wart     Erythematous  eczematous patches and plaques     Dystrophic onycholytic nail with subungual debris c/w onychomycosis     Umbilicated papule    Erythematous-base heme-crusted tan verrucoid plaque consistent with inflamed seborrheic keratosis     Erythematous Silvery Scaling Plaque c/w Psoriasis     See annotation          Assessment / Plan:        EIC (epidermal inclusion cyst) - under chin  -     Ambulatory referral/consult to Dermatology  Reassurance given to patient. No treatment is necessary.   Discussed options, risks, benefits and alternatives for treatment. All questions answered. Pt wishes to defer treatment. Would refer to dr del toro                 No follow-ups on file.

## 2022-03-02 ENCOUNTER — OFFICE VISIT (OUTPATIENT)
Dept: PSYCHIATRY | Facility: CLINIC | Age: 77
End: 2022-03-02
Payer: MEDICARE

## 2022-03-02 VITALS
DIASTOLIC BLOOD PRESSURE: 83 MMHG | WEIGHT: 217.94 LBS | SYSTOLIC BLOOD PRESSURE: 153 MMHG | BODY MASS INDEX: 36.26 KG/M2 | HEART RATE: 80 BPM

## 2022-03-02 DIAGNOSIS — F33.42 MAJOR DEPRESSIVE DISORDER, RECURRENT EPISODE, IN FULL REMISSION: Primary | ICD-10-CM

## 2022-03-02 PROCEDURE — 1159F MED LIST DOCD IN RCRD: CPT | Mod: CPTII,S$GLB,, | Performed by: PSYCHIATRY & NEUROLOGY

## 2022-03-02 PROCEDURE — 90833 PSYTX W PT W E/M 30 MIN: CPT | Mod: S$GLB,,, | Performed by: PSYCHIATRY & NEUROLOGY

## 2022-03-02 PROCEDURE — 99999 PR PBB SHADOW E&M-EST. PATIENT-LVL III: CPT | Mod: PBBFAC,,, | Performed by: PSYCHIATRY & NEUROLOGY

## 2022-03-02 PROCEDURE — 3079F DIAST BP 80-89 MM HG: CPT | Mod: CPTII,S$GLB,, | Performed by: PSYCHIATRY & NEUROLOGY

## 2022-03-02 PROCEDURE — 99999 PR PBB SHADOW E&M-EST. PATIENT-LVL III: ICD-10-PCS | Mod: PBBFAC,,, | Performed by: PSYCHIATRY & NEUROLOGY

## 2022-03-02 PROCEDURE — 1159F PR MEDICATION LIST DOCUMENTED IN MEDICAL RECORD: ICD-10-PCS | Mod: CPTII,S$GLB,, | Performed by: PSYCHIATRY & NEUROLOGY

## 2022-03-02 PROCEDURE — 3079F PR MOST RECENT DIASTOLIC BLOOD PRESSURE 80-89 MM HG: ICD-10-PCS | Mod: CPTII,S$GLB,, | Performed by: PSYCHIATRY & NEUROLOGY

## 2022-03-02 PROCEDURE — 1160F PR REVIEW ALL MEDS BY PRESCRIBER/CLIN PHARMACIST DOCUMENTED: ICD-10-PCS | Mod: CPTII,S$GLB,, | Performed by: PSYCHIATRY & NEUROLOGY

## 2022-03-02 PROCEDURE — 1160F RVW MEDS BY RX/DR IN RCRD: CPT | Mod: CPTII,S$GLB,, | Performed by: PSYCHIATRY & NEUROLOGY

## 2022-03-02 PROCEDURE — 99213 OFFICE O/P EST LOW 20 MIN: CPT | Mod: S$GLB,,, | Performed by: PSYCHIATRY & NEUROLOGY

## 2022-03-02 PROCEDURE — 90833 PR PSYCHOTHERAPY W/PATIENT W/E&M, 30 MIN (ADD ON): ICD-10-PCS | Mod: S$GLB,,, | Performed by: PSYCHIATRY & NEUROLOGY

## 2022-03-02 PROCEDURE — 3077F SYST BP >= 140 MM HG: CPT | Mod: CPTII,S$GLB,, | Performed by: PSYCHIATRY & NEUROLOGY

## 2022-03-02 PROCEDURE — 3077F PR MOST RECENT SYSTOLIC BLOOD PRESSURE >= 140 MM HG: ICD-10-PCS | Mod: CPTII,S$GLB,, | Performed by: PSYCHIATRY & NEUROLOGY

## 2022-03-02 PROCEDURE — 99213 PR OFFICE/OUTPT VISIT, EST, LEVL III, 20-29 MIN: ICD-10-PCS | Mod: S$GLB,,, | Performed by: PSYCHIATRY & NEUROLOGY

## 2022-03-02 RX ORDER — DESVENLAFAXINE SUCCINATE 50 MG/1
50 TABLET, EXTENDED RELEASE ORAL DAILY
Qty: 90 TABLET | Refills: 3 | Status: SHIPPED | OUTPATIENT
Start: 2022-03-02 | End: 2023-02-28 | Stop reason: SDUPTHER

## 2022-03-02 RX ORDER — CYANOCOBALAMIN/FOLIC AC/VIT B6 2-2.5-25MG
1 TABLET ORAL DAILY
Qty: 90 TABLET | Refills: 3 | Status: SHIPPED | OUTPATIENT
Start: 2022-03-02 | End: 2023-02-28 | Stop reason: SDUPTHER

## 2022-03-02 RX ORDER — MIRTAZAPINE 30 MG/1
30 TABLET, FILM COATED ORAL NIGHTLY
Qty: 90 TABLET | Refills: 3 | Status: SHIPPED | OUTPATIENT
Start: 2022-03-02 | End: 2022-08-01

## 2022-03-06 NOTE — PROGRESS NOTES
Outpatient Psychiatry Follow-Up Visit (MD/NP)    3/2/2022    Clinical Status of Patient:  Outpatient (Ambulatory)    Chief Complaint:  Caprice Gutierrez is a 76 y.o. female who presents today for follow-up of depression.  Met with patient.      Interval History and Content of Current Session:  Interim Events/Subjective Report/Content of Current Session:  Pt returned casually dressed and neatly groomed, wearing a mask due to residual COVID-19 regulations.  She reported that she is still in remission from her second lifetime episode of major depression, which was treated her in October, 2020.  She talked about an active life and an optimistic future, although she did lose a favorite brother this year.      Medications were reviewed.  Pt would like to try a decrease of medications.  Statistical odds on relapse were discussed.  Sleep is good.  It was suggested that she might decrease Remeron by half in October.  If depression returns, she should increase the dose again and call here.  She should also be on alert for a possible seasonal pattern, with relapse more typical in the fall.  She will return in 1 year or call as needed.  Blood pressure was discussed.  It is better, but not ideal.  Weight is fairly stable.    Psychotherapy:  · Target symptoms: depression  · Why chosen therapy is appropriate versus another modality: relevant to diagnosis, patient responds to this modality, evidence based practice  · Outcome monitoring methods: self-report, observation  · Therapeutic intervention type: behavior modifying psychotherapy, supportive psychotherapy  · Topics discussed/themes: building skills sets for symptom management, symptom recognition  · The patient's response to the intervention is motivated. The patient's progress toward treatment goals is excellent.   · Duration of intervention: 25 minutes.    Review of Systems   · PSYCHIATRIC: Pertinant items are noted in the narrative.  · CARDIOVASCULAR: HTN    Past  Medical, Family and Social History: The patient's past medical, family and social history have been reviewed and updated as appropriate within the electronic medical record - see encounter notes.    Compliance: yes    Side effects: weight gain    Risk Parameters:  Patient reports no suicidal ideation  Patient reports no homicidal ideation  Patient reports no self-injurious behavior  Patient reports no violent behavior    Exam (detailed: at least 9 elements; comprehensive: all 15 elements)   Constitutional  Vitals:  Most recent vital signs, dated less than 90 days prior to this appointment, were reviewed.   Vitals:    03/02/22 0935   BP: (!) 153/83   Pulse: 80   Weight: 98.9 kg (217 lb 14.8 oz)        General:  age appropriate, well nourished, neatly groomed     Musculoskeletal  Muscle Strength/Tone:  no dyskinesia, no dystonia, no tremor   Gait & Station:  non-ataxic     Psychiatric  Speech:  no latency; no press, spontaneous   Mood & Affect:  euthymic  congruent and appropriate   Thought Process:  goal-directed, logical   Associations:  intact   Thought Content:  normal, no suicidality, no homicidality, delusions, or paranoia   Insight:  has awareness of illness   Judgement: behavior is adequate to circumstances   Orientation:  grossly intact   Memory: intact for content of interview   Language: grossly intact   Attention Span & Concentration:  able to focus   Fund of Knowledge:  intact and appropriate to age and level of education     Assessment and Diagnosis   Status/Progress: Based on the examination today, the patient's problem(s) is/are well controlled.  New problems have not been presented today.   Co-morbidities are not complicating management of the primary condition.  There are no active rule-out diagnoses for this patient at this time.     General Impression:  Pt's depression remains in full remission.      ICD-10-CM ICD-9-CM   1. Major depressive disorder, recurrent episode, in full remission  F33.42  296.36       Intervention/Counseling/Treatment Plan   · Medication Management: Continue current medications.   · Diet and BP control were discussed.      Return to Clinic: 1 year

## 2022-03-14 NOTE — TELEPHONE ENCOUNTER
No new care gaps identified.  Powered by [employer]. Reference number: [id number].   3/14/2022 3:22:56 PM CDT

## 2022-03-15 ENCOUNTER — PES CALL (OUTPATIENT)
Dept: ADMINISTRATIVE | Facility: CLINIC | Age: 77
End: 2022-03-15
Payer: MEDICARE

## 2022-03-15 RX ORDER — HYDROCHLOROTHIAZIDE 25 MG/1
TABLET ORAL
Qty: 90 TABLET | Refills: 3 | Status: SHIPPED | OUTPATIENT
Start: 2022-03-15 | End: 2023-03-30

## 2022-03-15 NOTE — TELEPHONE ENCOUNTER

## 2022-05-27 RX ORDER — ERGOCALCIFEROL 1.25 MG/1
CAPSULE ORAL
Qty: 4 CAPSULE | Refills: 11 | Status: SHIPPED | OUTPATIENT
Start: 2022-05-27 | End: 2023-01-11

## 2022-05-27 NOTE — TELEPHONE ENCOUNTER
Refill Routing Note   Medication(s) are not appropriate for processing by [facility] for the following reason(s):      - Outside of protocol    [facility] action(s):  Route          Medication reconciliation completed: No     Appointments  past 12m or future 3m with PCP    Date Provider   Last Visit   12/16/2021 Nayely Garcia MD   Next Visit   Visit date not found Nayely Garcia MD   ED visits in past 90 days: 0        Note composed:2:40 PM 05/27/2022

## 2022-07-12 ENCOUNTER — PES CALL (OUTPATIENT)
Dept: ADMINISTRATIVE | Facility: CLINIC | Age: 77
End: 2022-07-12
Payer: MEDICARE

## 2022-07-26 DIAGNOSIS — Z12.11 SPECIAL SCREENING FOR MALIGNANT NEOPLASMS, COLON: Primary | ICD-10-CM

## 2022-07-26 RX ORDER — POLYETHYLENE GLYCOL 3350, SODIUM SULFATE ANHYDROUS, SODIUM BICARBONATE, SODIUM CHLORIDE, POTASSIUM CHLORIDE 236; 22.74; 6.74; 5.86; 2.97 G/4L; G/4L; G/4L; G/4L; G/4L
4 POWDER, FOR SOLUTION ORAL ONCE
Qty: 4000 ML | Refills: 0 | Status: SHIPPED | OUTPATIENT
Start: 2022-07-26 | End: 2022-07-26

## 2022-07-27 ENCOUNTER — TELEPHONE (OUTPATIENT)
Dept: INTERNAL MEDICINE | Facility: CLINIC | Age: 77
End: 2022-07-27
Payer: MEDICARE

## 2022-07-27 NOTE — TELEPHONE ENCOUNTER
----- Message from Chivo Lovelace sent at 7/27/2022  8:20 AM CDT -----  Contact: [phone]  Pt would like a call back about pt feels like she has bad rectum discomfort. Pt only wants to see her PCP that does not have any appt till Nov.

## 2022-08-01 ENCOUNTER — OFFICE VISIT (OUTPATIENT)
Dept: INTERNAL MEDICINE | Facility: CLINIC | Age: 77
End: 2022-08-01
Payer: MEDICARE

## 2022-08-01 VITALS
DIASTOLIC BLOOD PRESSURE: 88 MMHG | BODY MASS INDEX: 34.89 KG/M2 | WEIGHT: 209.44 LBS | SYSTOLIC BLOOD PRESSURE: 136 MMHG | HEIGHT: 65 IN | OXYGEN SATURATION: 99 % | HEART RATE: 86 BPM

## 2022-08-01 DIAGNOSIS — E08.9 DIABETES MELLITUS DUE TO UNDERLYING CONDITION WITHOUT COMPLICATION, WITHOUT LONG-TERM CURRENT USE OF INSULIN: ICD-10-CM

## 2022-08-01 DIAGNOSIS — I70.0 AORTIC ATHEROSCLEROSIS: ICD-10-CM

## 2022-08-01 DIAGNOSIS — K64.4 EXTERNAL HEMORRHOIDS: ICD-10-CM

## 2022-08-01 DIAGNOSIS — D64.9 ANEMIA, UNSPECIFIED TYPE: ICD-10-CM

## 2022-08-01 DIAGNOSIS — I10 HYPERTENSION, ESSENTIAL: Primary | ICD-10-CM

## 2022-08-01 DIAGNOSIS — R73.03 PREDIABETES: ICD-10-CM

## 2022-08-01 DIAGNOSIS — E78.5 HYPERLIPIDEMIA, UNSPECIFIED HYPERLIPIDEMIA TYPE: ICD-10-CM

## 2022-08-01 PROCEDURE — 3079F PR MOST RECENT DIASTOLIC BLOOD PRESSURE 80-89 MM HG: ICD-10-PCS | Mod: CPTII,S$GLB,, | Performed by: INTERNAL MEDICINE

## 2022-08-01 PROCEDURE — 1160F RVW MEDS BY RX/DR IN RCRD: CPT | Mod: CPTII,S$GLB,, | Performed by: INTERNAL MEDICINE

## 2022-08-01 PROCEDURE — 1159F MED LIST DOCD IN RCRD: CPT | Mod: CPTII,S$GLB,, | Performed by: INTERNAL MEDICINE

## 2022-08-01 PROCEDURE — 1126F PR PAIN SEVERITY QUANTIFIED, NO PAIN PRESENT: ICD-10-PCS | Mod: CPTII,S$GLB,, | Performed by: INTERNAL MEDICINE

## 2022-08-01 PROCEDURE — 99214 OFFICE O/P EST MOD 30 MIN: CPT | Mod: S$GLB,,, | Performed by: INTERNAL MEDICINE

## 2022-08-01 PROCEDURE — 1101F PT FALLS ASSESS-DOCD LE1/YR: CPT | Mod: CPTII,S$GLB,, | Performed by: INTERNAL MEDICINE

## 2022-08-01 PROCEDURE — 3079F DIAST BP 80-89 MM HG: CPT | Mod: CPTII,S$GLB,, | Performed by: INTERNAL MEDICINE

## 2022-08-01 PROCEDURE — 1159F PR MEDICATION LIST DOCUMENTED IN MEDICAL RECORD: ICD-10-PCS | Mod: CPTII,S$GLB,, | Performed by: INTERNAL MEDICINE

## 2022-08-01 PROCEDURE — 1101F PR PT FALLS ASSESS DOC 0-1 FALLS W/OUT INJ PAST YR: ICD-10-PCS | Mod: CPTII,S$GLB,, | Performed by: INTERNAL MEDICINE

## 2022-08-01 PROCEDURE — 1126F AMNT PAIN NOTED NONE PRSNT: CPT | Mod: CPTII,S$GLB,, | Performed by: INTERNAL MEDICINE

## 2022-08-01 PROCEDURE — 99214 PR OFFICE/OUTPT VISIT, EST, LEVL IV, 30-39 MIN: ICD-10-PCS | Mod: S$GLB,,, | Performed by: INTERNAL MEDICINE

## 2022-08-01 PROCEDURE — 3075F PR MOST RECENT SYSTOLIC BLOOD PRESS GE 130-139MM HG: ICD-10-PCS | Mod: CPTII,S$GLB,, | Performed by: INTERNAL MEDICINE

## 2022-08-01 PROCEDURE — 3075F SYST BP GE 130 - 139MM HG: CPT | Mod: CPTII,S$GLB,, | Performed by: INTERNAL MEDICINE

## 2022-08-01 PROCEDURE — 3288F PR FALLS RISK ASSESSMENT DOCUMENTED: ICD-10-PCS | Mod: CPTII,S$GLB,, | Performed by: INTERNAL MEDICINE

## 2022-08-01 PROCEDURE — 99999 PR PBB SHADOW E&M-EST. PATIENT-LVL V: ICD-10-PCS | Mod: PBBFAC,,, | Performed by: INTERNAL MEDICINE

## 2022-08-01 PROCEDURE — 99999 PR PBB SHADOW E&M-EST. PATIENT-LVL V: CPT | Mod: PBBFAC,,, | Performed by: INTERNAL MEDICINE

## 2022-08-01 PROCEDURE — 3288F FALL RISK ASSESSMENT DOCD: CPT | Mod: CPTII,S$GLB,, | Performed by: INTERNAL MEDICINE

## 2022-08-01 PROCEDURE — 1160F PR REVIEW ALL MEDS BY PRESCRIBER/CLIN PHARMACIST DOCUMENTED: ICD-10-PCS | Mod: CPTII,S$GLB,, | Performed by: INTERNAL MEDICINE

## 2022-08-01 NOTE — PROGRESS NOTES
Subjective:       Patient ID: Caprice Gutierrez is a 76 y.o. female.    Chief Complaint: discomfort in rectum    HPI   feels a protrusion in anus.  She is very anxious.    Last colonoscopy  2016- adenoma and hyperplastic polyp removed.     Scope scheduled in August, but may need to r's.    Prediabetes, hyperlipidemia, htn.    BP always lower and normal,  at home.    Review of Systems   Constitutional: Negative for fever and unexpected weight change.   HENT: Negative for nasal congestion and postnasal drip.    Eyes: Negative for pain, discharge and visual disturbance.   Respiratory: Negative for cough, chest tightness, shortness of breath and wheezing.    Cardiovascular: Negative for chest pain and leg swelling.   Gastrointestinal: Negative for abdominal pain, constipation, diarrhea and nausea.   Genitourinary: Negative for difficulty urinating, dysuria and hematuria.   Integumentary:  Negative for rash.   Neurological: Negative for headaches.   Psychiatric/Behavioral: Negative for dysphoric mood and sleep disturbance. The patient is not nervous/anxious.          Objective:      Physical Exam  Constitutional:       Appearance: She is well-developed.   Eyes:      General: No scleral icterus.  Neck:      Thyroid: No thyromegaly.      Vascular: No JVD.   Cardiovascular:      Rate and Rhythm: Normal rate and regular rhythm.      Heart sounds: Normal heart sounds.   Pulmonary:      Effort: Pulmonary effort is normal. No respiratory distress.      Breath sounds: Normal breath sounds. No wheezing or rales.   Abdominal:      Palpations: Abdomen is soft. There is no mass.      Tenderness: There is no abdominal tenderness.   Neurological:      Mental Status: She is alert and oriented to person, place, and time.   Psychiatric:         Behavior: Behavior normal.       144/96  After exam 136/88    Assessment:       Problem List Items Addressed This Visit     Prediabetes    Relevant Orders    Hemoglobin A1C    Hypertension,  essential - Primary    Hyperlipidemia    Relevant Orders    Comprehensive Metabolic Panel    Lipid Panel      Other Visit Diagnoses     Anemia, unspecified type        Relevant Orders    CBC Without Differential    Diabetes mellitus due to underlying condition without complication, without long-term current use of insulin         BMI 34.0-34.9,adult        External hemorrhoids              Plan:       Caprice was seen today for discomfort in rectum.    Diagnoses and all orders for this visit:    Hypertension, essential    Anemia, unspecified type  -     CBC Without Differential; Future    Hyperlipidemia, unspecified hyperlipidemia type  -     Comprehensive Metabolic Panel; Future  -     Lipid Panel; Future    Prediabetes  -     Hemoglobin A1C; Future  -     Cancel: Ambulatory referral/consult to Diabetes Education; Future    Diabetes mellitus due to underlying condition without complication, without long-term current use of insulin   -     Cancel: Ambulatory referral/consult to Diabetes Education; Future    BMI 34.0-34.9,adult    External hemorrhoids         Schedule Colonoscopy.    On 2nd thought,daughter will teach her how to use glucometer.

## 2022-08-02 ENCOUNTER — LAB VISIT (OUTPATIENT)
Dept: LAB | Facility: HOSPITAL | Age: 77
End: 2022-08-02
Attending: INTERNAL MEDICINE
Payer: MEDICARE

## 2022-08-02 DIAGNOSIS — D64.9 ANEMIA, UNSPECIFIED TYPE: ICD-10-CM

## 2022-08-02 DIAGNOSIS — R73.03 PREDIABETES: ICD-10-CM

## 2022-08-02 DIAGNOSIS — E78.5 HYPERLIPIDEMIA, UNSPECIFIED HYPERLIPIDEMIA TYPE: ICD-10-CM

## 2022-08-02 LAB
ALBUMIN SERPL BCP-MCNC: 4.3 G/DL (ref 3.5–5.2)
ALP SERPL-CCNC: 85 U/L (ref 38–126)
ALT SERPL W/O P-5'-P-CCNC: 16 U/L (ref 10–44)
ANION GAP SERPL CALC-SCNC: 5 MMOL/L (ref 8–16)
AST SERPL-CCNC: 28 U/L (ref 15–46)
BILIRUB SERPL-MCNC: 0.8 MG/DL (ref 0.1–1)
CALCIUM SERPL-MCNC: 9.1 MG/DL (ref 8.7–10.5)
CHLORIDE SERPL-SCNC: 99 MMOL/L (ref 95–110)
CHOLEST SERPL-MCNC: 213 MG/DL (ref 120–199)
CHOLEST/HDLC SERPL: 3 {RATIO} (ref 2–5)
CO2 SERPL-SCNC: 31 MMOL/L (ref 23–29)
CREAT SERPL-MCNC: 1.06 MG/DL (ref 0.5–1.4)
ERYTHROCYTE [DISTWIDTH] IN BLOOD BY AUTOMATED COUNT: 15.2 % (ref 11.5–14.5)
EST. GFR  (NO RACE VARIABLE): 54.4 ML/MIN/1.73 M^2
ESTIMATED AVG GLUCOSE: 143 MG/DL (ref 68–131)
FERRITIN SERPL-MCNC: 121 NG/ML (ref 20–300)
GLUCOSE SERPL-MCNC: 99 MG/DL (ref 70–110)
HBA1C MFR BLD: 6.6 % (ref 4–5.6)
HCT VFR BLD AUTO: 35.9 % (ref 37–48.5)
HDLC SERPL-MCNC: 72 MG/DL (ref 40–75)
HDLC SERPL: 33.8 % (ref 20–50)
HGB BLD-MCNC: 12.1 G/DL (ref 12–16)
IRON SERPL-MCNC: 97 UG/DL (ref 30–160)
LDLC SERPL CALC-MCNC: 129.2 MG/DL (ref 63–159)
MCH RBC QN AUTO: 28.7 PG (ref 27–31)
MCHC RBC AUTO-ENTMCNC: 33.7 G/DL (ref 32–36)
MCV RBC AUTO: 85 FL (ref 82–98)
NONHDLC SERPL-MCNC: 141 MG/DL
PLATELET # BLD AUTO: 334 K/UL (ref 150–450)
PMV BLD AUTO: 9.5 FL (ref 9.2–12.9)
POTASSIUM SERPL-SCNC: 3.9 MMOL/L (ref 3.5–5.1)
PROT SERPL-MCNC: 7.7 G/DL (ref 6–8.4)
RBC # BLD AUTO: 4.22 M/UL (ref 4–5.4)
SATURATED IRON: 29 % (ref 20–50)
SODIUM SERPL-SCNC: 135 MMOL/L (ref 136–145)
TOTAL IRON BINDING CAPACITY: 332 UG/DL (ref 250–450)
TRANSFERRIN SERPL-MCNC: 224 MG/DL (ref 200–375)
TRIGL SERPL-MCNC: 59 MG/DL (ref 30–150)
UUN UR-MCNC: 15 MG/DL (ref 7–17)
WBC # BLD AUTO: 6.23 K/UL (ref 3.9–12.7)

## 2022-08-02 PROCEDURE — 36415 COLL VENOUS BLD VENIPUNCTURE: CPT | Mod: PO | Performed by: INTERNAL MEDICINE

## 2022-08-02 PROCEDURE — 82728 ASSAY OF FERRITIN: CPT | Performed by: INTERNAL MEDICINE

## 2022-08-02 PROCEDURE — 80053 COMPREHEN METABOLIC PANEL: CPT | Mod: PO | Performed by: INTERNAL MEDICINE

## 2022-08-02 PROCEDURE — 80061 LIPID PANEL: CPT | Performed by: INTERNAL MEDICINE

## 2022-08-02 PROCEDURE — 84466 ASSAY OF TRANSFERRIN: CPT | Mod: PO | Performed by: INTERNAL MEDICINE

## 2022-08-02 PROCEDURE — 85027 COMPLETE CBC AUTOMATED: CPT | Mod: PO | Performed by: INTERNAL MEDICINE

## 2022-08-02 PROCEDURE — 83036 HEMOGLOBIN GLYCOSYLATED A1C: CPT | Performed by: INTERNAL MEDICINE

## 2022-08-09 ENCOUNTER — TELEPHONE (OUTPATIENT)
Dept: INTERNAL MEDICINE | Facility: CLINIC | Age: 77
End: 2022-08-09
Payer: MEDICARE

## 2022-08-09 NOTE — TELEPHONE ENCOUNTER
----- Message from Makenzie Cage sent at 8/9/2022 11:47 AM CDT -----  Contact: [phone]  Pt is calling for the nurse she states she came last week she states she told the nurse of the 2 medications for depression and she states the prescription of Remron and she is wanting this back on her list of medications please give return call

## 2022-08-09 NOTE — TELEPHONE ENCOUNTER
Returned patient call states she is not fully off of remeron she is being taken off medication and was told by Dr. Rodriguez he will remove it completely at her next visit.   Statement Selected

## 2022-08-31 ENCOUNTER — ANESTHESIA (OUTPATIENT)
Dept: ENDOSCOPY | Facility: HOSPITAL | Age: 77
End: 2022-08-31
Payer: MEDICARE

## 2022-08-31 ENCOUNTER — ANESTHESIA EVENT (OUTPATIENT)
Dept: ENDOSCOPY | Facility: HOSPITAL | Age: 77
End: 2022-08-31
Payer: MEDICARE

## 2022-08-31 ENCOUNTER — HOSPITAL ENCOUNTER (OUTPATIENT)
Facility: HOSPITAL | Age: 77
Discharge: HOME | End: 2022-08-31
Attending: INTERNAL MEDICINE | Admitting: INTERNAL MEDICINE
Payer: MEDICARE

## 2022-08-31 VITALS
BODY MASS INDEX: 34.49 KG/M2 | SYSTOLIC BLOOD PRESSURE: 166 MMHG | HEART RATE: 66 BPM | WEIGHT: 207 LBS | RESPIRATION RATE: 16 BRPM | HEIGHT: 65 IN | OXYGEN SATURATION: 98 % | TEMPERATURE: 98 F | DIASTOLIC BLOOD PRESSURE: 72 MMHG

## 2022-08-31 DIAGNOSIS — Z86.010 HISTORY OF COLON POLYPS: ICD-10-CM

## 2022-08-31 PROCEDURE — 45385 COLONOSCOPY W/LESION REMOVAL: CPT | Mod: PT,,, | Performed by: INTERNAL MEDICINE

## 2022-08-31 PROCEDURE — 88305 TISSUE EXAM BY PATHOLOGIST: ICD-10-PCS | Mod: 26,,, | Performed by: PATHOLOGY

## 2022-08-31 PROCEDURE — 63600175 PHARM REV CODE 636 W HCPCS: Performed by: NURSE ANESTHETIST, CERTIFIED REGISTERED

## 2022-08-31 PROCEDURE — 88305 TISSUE EXAM BY PATHOLOGIST: CPT | Mod: 59 | Performed by: PATHOLOGY

## 2022-08-31 PROCEDURE — 45385 PR COLONOSCOPY,REMV LESN,SNARE: ICD-10-PCS | Mod: PT,,, | Performed by: INTERNAL MEDICINE

## 2022-08-31 PROCEDURE — 27201089 HC SNARE, DISP (ANY): Performed by: INTERNAL MEDICINE

## 2022-08-31 PROCEDURE — 88305 TISSUE EXAM BY PATHOLOGIST: CPT | Mod: 26,,, | Performed by: PATHOLOGY

## 2022-08-31 PROCEDURE — 37000008 HC ANESTHESIA 1ST 15 MINUTES: Performed by: INTERNAL MEDICINE

## 2022-08-31 PROCEDURE — 25000003 PHARM REV CODE 250: Performed by: INTERNAL MEDICINE

## 2022-08-31 PROCEDURE — 37000009 HC ANESTHESIA EA ADD 15 MINS: Performed by: INTERNAL MEDICINE

## 2022-08-31 PROCEDURE — E9220 PRA ENDO ANESTHESIA: ICD-10-PCS | Mod: 33,,, | Performed by: NURSE ANESTHETIST, CERTIFIED REGISTERED

## 2022-08-31 PROCEDURE — 45385 COLONOSCOPY W/LESION REMOVAL: CPT | Mod: PT | Performed by: INTERNAL MEDICINE

## 2022-08-31 PROCEDURE — E9220 PRA ENDO ANESTHESIA: HCPCS | Mod: 33,,, | Performed by: NURSE ANESTHETIST, CERTIFIED REGISTERED

## 2022-08-31 RX ORDER — PROPOFOL 10 MG/ML
INJECTION, EMULSION INTRAVENOUS CONTINUOUS PRN
Status: DISCONTINUED | OUTPATIENT
Start: 2022-08-31 | End: 2022-08-31

## 2022-08-31 RX ORDER — SODIUM CHLORIDE 9 MG/ML
INJECTION, SOLUTION INTRAVENOUS CONTINUOUS
Status: DISCONTINUED | OUTPATIENT
Start: 2022-08-31 | End: 2022-08-31 | Stop reason: HOSPADM

## 2022-08-31 RX ORDER — PROPOFOL 10 MG/ML
INJECTION, EMULSION INTRAVENOUS
Status: DISCONTINUED | OUTPATIENT
Start: 2022-08-31 | End: 2022-08-31

## 2022-08-31 RX ORDER — LIDOCAINE HCL/PF 100 MG/5ML
SYRINGE (ML) INTRAVENOUS
Status: DISCONTINUED | OUTPATIENT
Start: 2022-08-31 | End: 2022-08-31

## 2022-08-31 RX ADMIN — PROPOFOL 150 MCG/KG/MIN: 10 INJECTION, EMULSION INTRAVENOUS at 09:08

## 2022-08-31 RX ADMIN — PROPOFOL 30 MG: 10 INJECTION, EMULSION INTRAVENOUS at 09:08

## 2022-08-31 RX ADMIN — Medication 50 MG: at 09:08

## 2022-08-31 RX ADMIN — SODIUM CHLORIDE: 0.9 INJECTION, SOLUTION INTRAVENOUS at 09:08

## 2022-08-31 RX ADMIN — PROPOFOL 80 MG: 10 INJECTION, EMULSION INTRAVENOUS at 09:08

## 2022-08-31 NOTE — PROVATION PATIENT INSTRUCTIONS
Discharge Summary/Instructions after an Endoscopic Procedure  Patient Name: Caprice Gutierrez  Patient MRN: [id number]  Patient YOB: 1945 Wednesday, August 31, 2022  Lee More MD  Dear patient,  As a result of recent federal legislation (The Federal Cures Act), you may   receive lab or pathology results from your procedure in your [facility]   account before your physician is able to contact you. Your physician or   their representative will relay the results to you with their   recommendations at their soonest availability.  Thank you,  RESTRICTIONS:  During your procedure today, you received medications for sedation.  These   medications may affect your judgment, balance and coordination.  Therefore,   for 24 hours, you have the following restrictions:   - DO NOT drive a car, operate machinery, make legal/financial decisions,   sign important papers or drink alcohol.    ACTIVITY:  Today: no heavy lifting, straining or running due to procedural   sedation/anesthesia.  The following day: return to full activity including work.  DIET:  Eat and drink normally unless instructed otherwise.     TREATMENT FOR COMMON SIDE EFFECTS:  - Mild abdominal pain, nausea, belching, bloating or excessive gas:  rest,   eat lightly and use a heating pad.  - Sore Throat: treat with throat lozenges and/or gargle with warm salt   water.  - Because air was used during the procedure, expelling large amounts of air   from your rectum or belching is normal.  - If a bowel prep was taken, you may not have a bowel movement for 1-3 days.    This is normal.  SYMPTOMS TO WATCH FOR AND REPORT TO YOUR PHYSICIAN:  1. Abdominal pain or bloating, other than gas cramps.  2. Chest pain.  3. Back pain.  4. Signs of infection such as: chills or fever occurring within 24 hours   after the procedure.  5. Rectal bleeding, which would show as bright red, maroon, or black stools.   (A tablespoon of blood from the rectum is not serious,  especially if   hemorrhoids are present.)  6. Vomiting.  7. Weakness or dizziness.  GO DIRECTLY TO THE NEAREST EMERGENCY ROOM IF YOU HAVE ANY OF THE FOLLOWING:      Difficulty breathing              Chills and/or fever over 101 F   Persistent vomiting and/or vomiting blood   Severe abdominal pain   Severe chest pain   Black, tarry stools   Bleeding- more than one tablespoon   Any other symptom or condition that you feel may need urgent attention  Your doctor recommends these additional instructions:  If any biopsies were taken, your doctors clinic will contact you in 1 to 2   weeks with any results.  - Discharge patient to home.   - Resume previous diet today.   - Continue present medications.   - Await pathology results.   - Repeat colonoscopy in 3 years for surveillance.   - Return to referring physician as previously scheduled.   - Patient has a contact number available for emergencies.  The signs and   symptoms of potential delayed complications were discussed with the   patient.  Return to normal activities tomorrow.  Written discharge   instructions were provided to the patient.  For questions, problems or results please call your physician - Lee More MD at Work:  [phone].  [facility], EMERGENCY ROOM PHONE NUMBER: [phone]  IF A COMPLICATION OR EMERGENCY SITUATION ARISES AND YOU ARE UNABLE TO REACH   YOUR PHYSICIAN - GO DIRECTLY TO THE EMERGENCY ROOM.  Lee More MD  8/31/2022 10:03:26 AM  This report has been verified and signed electronically.  Dear patient,  As a result of recent federal legislation (The Federal Cures Act), you may   receive lab or pathology results from your procedure in your [facility]   account before your physician is able to contact you. Your physician or   their representative will relay the results to you with their   recommendations at their soonest availability.  Thank you,  PROVATION

## 2022-08-31 NOTE — H&P
Short Stay Endoscopy History and Physical    PCP - Nayely Garcia MD    Procedure - Colonoscopy  ASA - 2  Mallampati - per anesthesia  History of Anesthesia problems - no  Family history Anesthesia problems -  no     HPI:  This is a 76 y.o. female here for evaluation of :     Average Risk Screening: No  High risk screening: yes  History of polyps: yes  Anemia: No  Blood in stools: No  Diarrhea: No  Abdominal Pain: No    Review of Systems:  CONSTITUTIONAL: Denies weight change,  fatigue, fevers, chills, night sweats.  CARDIOVASCULAR: Denies chest pain, shortness of breath, orthopnea and edema.  RESPIRATORY: Denies cough, hemoptysis, dyspnea, and wheezing.  GI: See HPI.    Medical History:  Past Medical History:   Diagnosis Date    Abnormal Pap smear 1985, 2000    LEEP, [facility]    Allergy     Anemia     Anxiety     Degenerative arthritis of shoulder region     Depression     Diabetes mellitus type II     GERD (gastroesophageal reflux disease)     Headache     Hemangioma of liver     Hx of psychiatric care     Hyperlipidemia     Hypertension     Keloid cicatrix     Rachael     Antidepressant-induced.    Obesity     Psychiatric problem     Therapy        Surgical History:   Past Surgical History:   Procedure Laterality Date    APPENDECTOMY  1980    CERVICAL BIOPSY  W/ LOOP ELECTRODE EXCISION  1985    CHOLECYSTECTOMY  1980    [facility]  2000    COLONOSCOPY N/A 7/12/2016    Procedure: COLONOSCOPY;  Surgeon: Chito Day MD;  Location: [facility] ([address] [place] [address]);  Service: Endoscopy;  Laterality: N/A;    DILATION AND CURETTAGE OF UTERUS  1983    HERNIA REPAIR      LIPOMA RESECTION      TUBAL LIGATION  1985    UMBILICAL HERNIA REPAIR      Wedge resection liver -hemangioma         Family History:   Family History   Problem Relation Age of Onset    Diabetes Brother     Alcohol abuse Brother     Cirrhosis Brother     Colon cancer Sister 69    Hypertension Sister     Cancer Sister         colon    Diabetes Sister     Arthritis Sister      Cancer Mother         brain    Diabetes Brother     Cancer Brother         lung    Dementia Father     Hypertension Maternal Aunt     Stroke Maternal Aunt     Alcohol abuse Maternal Uncle     Suicide Cousin     Melanoma Neg Hx     Bipolar disorder Neg Hx     Drug abuse Neg Hx     Schizophrenia Neg Hx        Social History:   Social History     Tobacco Use    Smoking status: Never    Smokeless tobacco: Never   Substance Use Topics    Alcohol use: No    Drug use: No       Allergies: Reviewed.    Medications:  No current facility-administered medications on file prior to encounter.     Current Outpatient Medications on File Prior to Encounter   Medication Sig Dispense Refill    aspirin (ECOTRIN) 81 MG EC tablet Take 81 mg by mouth once daily.      desvenlafaxine succinate (PRISTIQ) 50 MG Tb24 Take 1 tablet (50 mg total) by mouth once daily. 90 tablet 3    ergocalciferol (VITAMIN D2) 50,000 unit Cap TAKE ONE CAPSULE BY MOUTH EVERY 7 DAYS 4 capsule 11    esomeprazole (NEXIUM) 20 MG capsule Take 20 mg by mouth before breakfast.      folic acid-vit B6-vit B12 2.5-25-2 mg (NIVA-FOL) 2.5-25-2 mg Tab Take 1 tablet by mouth once daily. 90 tablet 3    hydroCHLOROthiazide (HYDRODIURIL) 25 MG tablet TAKE ONE TABLET BY MOUTH EVERY DAY 90 tablet 3    irbesartan (AVAPRO) 300 MG tablet TAKE ONE TABLET BY MOUTH DAILY IN THE EVENING 90 tablet 1    ondansetron (ZOFRAN) 8 MG tablet Take 1 tablet (8 mg total) by mouth every 12 (twelve) hours as needed for Nausea. (Patient not taking: Reported on 8/1/2022) 20 tablet 0    [DISCONTINUED] sertraline (ZOLOFT) 25 MG tablet Take 1 tablet (25 mg total) by mouth once daily. 30 tablet 11       Physical Exam:  Vital Signs:   Vitals:    08/31/22 0910   BP: (!) 205/93   Pulse: 82   Resp: 18   Temp: 97.5 °F (36.4 °C)     General Appearance: Well appearing in no acute distress  ENT: OP clear  Chest: CTA B  CV: RRR, no m/r/g  Abd: s/nt/nd/nabs  Ext: no edema    Labs:  Reviewed    IMPRESSION:    Hx of  polyps    Plan:  I have explained the risks and benefits of colonoscopy to the patient including but not limited to bleeding, perforation, infection, and death. The patient wishes to proceed with colonoscopy.

## 2022-08-31 NOTE — TRANSFER OF CARE
"Anesthesia Transfer of Care Note    Patient: Capirce Gutierrez    Procedure(s) Performed: Procedure(s) (LRB):  COLONOSCOPY (N/A)    Patient location: GI    Anesthesia Type: general    Transport from OR: Transported from OR on room air with adequate spontaneous ventilation    Post pain: adequate analgesia    Post assessment: no apparent anesthetic complications    Post vital signs: stable    Level of consciousness: awake    Nausea/Vomiting: no nausea/vomiting    Complications: none    Transfer of care protocol was followed      Last vitals:   Visit Vitals  /64   Pulse 73   Temp 36   Resp 19   Ht 5' 5" (1.651 m)   Wt 93.9 kg (207 lb)   SpO2 98%   Breastfeeding No   BMI 34.45 kg/m²     "

## 2022-08-31 NOTE — ANESTHESIA PREPROCEDURE EVALUATION
08/31/2022  Caprice Gutierrez is a 76 y.o., female.  Past Medical History:   Diagnosis Date    Abnormal Pap smear 1985, 2000    LEEP, [facility]    Allergy     Anemia     Anxiety     Degenerative arthritis of shoulder region     Depression     Diabetes mellitus type II     GERD (gastroesophageal reflux disease)     Headache     Hemangioma of liver     Hx of psychiatric care     Hyperlipidemia     Hypertension     Keloid cicatrix     Rachael     Antidepressant-induced.    Obesity     Psychiatric problem     Therapy      Patient Active Problem List   Diagnosis    GERD (gastroesophageal reflux disease)    Vitamin D deficiency    Hyperlipidemia    Hypertension, essential    Family history of colon cancer    Vertigo    Prediabetes    Aortic atherosclerosis    Class 1 obesity due to excess calories with serious comorbidity in adult     Pre-op Assessment    I have reviewed the NPO Status.      Review of Systems  Hematology/Oncology:         -- Anemia:   Cardiovascular:   Hypertension (Elevated today. Pt states baseline is WNL. Pt mildly anxious)    Hepatic/GI:   GERD, well controlled Liver Disease,    Musculoskeletal:   Arthritis     Neurological:   Headaches    Endocrine:   Diabetes  Obesity / BMI > 30  Psych:   Psychiatric History depression          Physical Exam    Airway:  Mallampati: II   Mouth Opening: Normal  TM Distance: Normal  Neck ROM: Normal ROM        Anesthesia Plan  Type of Anesthesia, risks & benefits discussed:    Anesthesia Type: Gen Natural Airway  Intra-op Monitoring Plan: Standard ASA Monitors  Induction:  IV  Informed Consent: Informed consent signed with the Patient and all parties understand the risks and agree with anesthesia plan.  All questions answered.   ASA Score: 2    Ready For Surgery From Anesthesia Perspective.     .

## 2022-08-31 NOTE — ANESTHESIA POSTPROCEDURE EVALUATION
Anesthesia Post Evaluation    Patient: Caprice Gutierrez    Procedure(s) Performed: Procedure(s) (LRB):  COLONOSCOPY (N/A)    Final Anesthesia Type: general      Patient location during evaluation: PACU  Patient participation: Yes- Able to Participate  Level of consciousness: awake and alert  Post-procedure vital signs: reviewed and stable  Pain management: adequate  Airway patency: patent    PONV status at discharge: No PONV  Anesthetic complications: no      Cardiovascular status: blood pressure returned to baseline  Respiratory status: spontaneous ventilation and room air  Hydration status: euvolemic  Follow-up not needed.          Vitals Value Taken Time   /72 08/31/22 1040   Temp 36.8 °C (98.2 °F) 08/31/22 1006   Pulse 66 08/31/22 1040   Resp 16 08/31/22 1040   SpO2 98 % 08/31/22 1040         Event Time   Out of Recovery 10:41:49         Pain/Robin Score: Robin Score: 8 (8/31/2022 10:06 AM)

## 2022-09-05 ENCOUNTER — TELEPHONE (OUTPATIENT)
Dept: INTERNAL MEDICINE | Facility: CLINIC | Age: 77
End: 2022-09-05
Payer: MEDICARE

## 2022-09-05 NOTE — TELEPHONE ENCOUNTER
Pls review unread MO message with her    Ms Gutierrez - labs all acceptable.  Cholesterol ideally would be lower.  LEt me know if you have changed your mind and want to start a statin.  A1c is the highest it has ever been - work on  diet, wt loss.  Iron and blood count are normal.

## 2022-09-07 LAB
FINAL PATHOLOGIC DIAGNOSIS: NORMAL
GROSS: NORMAL
Lab: NORMAL

## 2022-10-25 ENCOUNTER — TELEPHONE (OUTPATIENT)
Dept: INTERNAL MEDICINE | Facility: CLINIC | Age: 77
End: 2022-10-25
Payer: MEDICARE

## 2022-10-25 NOTE — TELEPHONE ENCOUNTER
----- Message from Makenzie Cage sent at 10/25/2022  9:52 AM CDT -----  Contact: [phone]  Pt is calling to schedule her follow up for December please give return call   She states the week of December 12th around 10 am

## 2022-12-13 ENCOUNTER — OFFICE VISIT (OUTPATIENT)
Dept: INTERNAL MEDICINE | Facility: CLINIC | Age: 77
End: 2022-12-13
Payer: MEDICARE

## 2022-12-13 VITALS
DIASTOLIC BLOOD PRESSURE: 84 MMHG | WEIGHT: 208.75 LBS | OXYGEN SATURATION: 100 % | HEART RATE: 64 BPM | SYSTOLIC BLOOD PRESSURE: 130 MMHG | HEIGHT: 65 IN | BODY MASS INDEX: 34.78 KG/M2

## 2022-12-13 DIAGNOSIS — E78.5 HYPERLIPIDEMIA, UNSPECIFIED HYPERLIPIDEMIA TYPE: ICD-10-CM

## 2022-12-13 DIAGNOSIS — E66.9 DIABETES MELLITUS TYPE 2 IN OBESE: ICD-10-CM

## 2022-12-13 DIAGNOSIS — E11.69 DIABETES MELLITUS TYPE 2 IN OBESE: ICD-10-CM

## 2022-12-13 DIAGNOSIS — F32.5 MAJOR DEPRESSIVE DISORDER IN FULL REMISSION, UNSPECIFIED WHETHER RECURRENT: ICD-10-CM

## 2022-12-13 DIAGNOSIS — I10 HYPERTENSION, ESSENTIAL: Primary | ICD-10-CM

## 2022-12-13 PROCEDURE — 1126F AMNT PAIN NOTED NONE PRSNT: CPT | Mod: CPTII,S$GLB,, | Performed by: INTERNAL MEDICINE

## 2022-12-13 PROCEDURE — 99999 PR PBB SHADOW E&M-EST. PATIENT-LVL III: ICD-10-PCS | Mod: PBBFAC,,, | Performed by: INTERNAL MEDICINE

## 2022-12-13 PROCEDURE — 3075F SYST BP GE 130 - 139MM HG: CPT | Mod: CPTII,S$GLB,, | Performed by: INTERNAL MEDICINE

## 2022-12-13 PROCEDURE — 99214 OFFICE O/P EST MOD 30 MIN: CPT | Mod: S$GLB,,, | Performed by: INTERNAL MEDICINE

## 2022-12-13 PROCEDURE — 3079F PR MOST RECENT DIASTOLIC BLOOD PRESSURE 80-89 MM HG: ICD-10-PCS | Mod: CPTII,S$GLB,, | Performed by: INTERNAL MEDICINE

## 2022-12-13 PROCEDURE — 1159F PR MEDICATION LIST DOCUMENTED IN MEDICAL RECORD: ICD-10-PCS | Mod: CPTII,S$GLB,, | Performed by: INTERNAL MEDICINE

## 2022-12-13 PROCEDURE — 99999 PR PBB SHADOW E&M-EST. PATIENT-LVL III: CPT | Mod: PBBFAC,,, | Performed by: INTERNAL MEDICINE

## 2022-12-13 PROCEDURE — 3288F PR FALLS RISK ASSESSMENT DOCUMENTED: ICD-10-PCS | Mod: CPTII,S$GLB,, | Performed by: INTERNAL MEDICINE

## 2022-12-13 PROCEDURE — 3079F DIAST BP 80-89 MM HG: CPT | Mod: CPTII,S$GLB,, | Performed by: INTERNAL MEDICINE

## 2022-12-13 PROCEDURE — 99214 PR OFFICE/OUTPT VISIT, EST, LEVL IV, 30-39 MIN: ICD-10-PCS | Mod: S$GLB,,, | Performed by: INTERNAL MEDICINE

## 2022-12-13 PROCEDURE — 1159F MED LIST DOCD IN RCRD: CPT | Mod: CPTII,S$GLB,, | Performed by: INTERNAL MEDICINE

## 2022-12-13 PROCEDURE — 1101F PR PT FALLS ASSESS DOC 0-1 FALLS W/OUT INJ PAST YR: ICD-10-PCS | Mod: CPTII,S$GLB,, | Performed by: INTERNAL MEDICINE

## 2022-12-13 PROCEDURE — 1101F PT FALLS ASSESS-DOCD LE1/YR: CPT | Mod: CPTII,S$GLB,, | Performed by: INTERNAL MEDICINE

## 2022-12-13 PROCEDURE — 3075F PR MOST RECENT SYSTOLIC BLOOD PRESS GE 130-139MM HG: ICD-10-PCS | Mod: CPTII,S$GLB,, | Performed by: INTERNAL MEDICINE

## 2022-12-13 PROCEDURE — 1126F PR PAIN SEVERITY QUANTIFIED, NO PAIN PRESENT: ICD-10-PCS | Mod: CPTII,S$GLB,, | Performed by: INTERNAL MEDICINE

## 2022-12-13 PROCEDURE — 3288F FALL RISK ASSESSMENT DOCD: CPT | Mod: CPTII,S$GLB,, | Performed by: INTERNAL MEDICINE

## 2022-12-13 NOTE — PROGRESS NOTES
Subjective:       Patient ID: Caprice Gutierrez is a 77 y.o. female.    Chief Complaint: Follow-up    HPI  Comes in earlier that expected.    Dm with hyperlipidemia, htn.  Sugars running about 100 at home.    BP always lower and normal,  at home.    Colonosocpy - several polyps removed, serrated.  Repeat suggested 3 years..    Depression is well controlled.  Review of Systems   Constitutional:  Negative for fever and unexpected weight change.   HENT:  Negative for nasal congestion and postnasal drip.    Eyes:  Negative for pain, discharge and visual disturbance.   Respiratory:  Negative for cough, chest tightness, shortness of breath and wheezing.    Cardiovascular:  Negative for chest pain and leg swelling.   Gastrointestinal:  Negative for abdominal pain, constipation, diarrhea and nausea.   Genitourinary:  Negative for difficulty urinating, dysuria and hematuria.   Integumentary:  Negative for rash.   Neurological:  Negative for headaches.   Psychiatric/Behavioral:  Negative for dysphoric mood and sleep disturbance. The patient is not nervous/anxious.        Objective:      Physical Exam  Constitutional:       Appearance: She is obese. She is not ill-appearing or diaphoretic.   Neurological:      General: No focal deficit present.      Mental Status: She is alert and oriented to person, place, and time.   Psychiatric:         Mood and Affect: Mood normal.         Behavior: Behavior normal.         Thought Content: Thought content normal.       130/84  Lab Results   Component Value Date    WBC 6.23 08/02/2022    HGB 12.1 08/02/2022    HCT 35.9 (L) 08/02/2022     08/02/2022    CHOL 213 (H) 08/02/2022    TRIG 59 08/02/2022    HDL 72 08/02/2022    ALT 16 08/02/2022    AST 28 08/02/2022     (L) 08/02/2022    K 3.9 08/02/2022    CL 99 08/02/2022    CREATININE 1.06 08/02/2022    BUN 15 08/02/2022    CO2 31 (H) 08/02/2022    TSH 0.789 10/23/2020    GLUF 108 08/20/2004    HGBA1C 6.6 (H) 08/02/2022       Assessment:       Problem List Items Addressed This Visit       Major depressive disorder in full remission    RESOLVED: Hypertension, essential - Primary    Hyperlipidemia    Relevant Orders    Lipid Panel    Diabetes mellitus type 2 in obese    Relevant Orders    Comprehensive Metabolic Panel    CBC Auto Differential    Microalbumin/Creatinine Ratio, Urine    Hemoglobin A1C       Plan:       Caprice was seen today for follow-up.    Diagnoses and all orders for this visit:    Hypertension, essential    Diabetes mellitus type 2 in obese  -     Comprehensive Metabolic Panel; Future  -     CBC Auto Differential; Future  -     Microalbumin/Creatinine Ratio, Urine; Future  -     Hemoglobin A1C; Future    Hyperlipidemia, unspecified hyperlipidemia type  -     Lipid Panel; Future    Major depressive disorder in full remission, unspecified whether recurrent         She declines all vaccines  Routine eye exam.  Urine when she returns, as just urinated.  PE aug with labs.  Cont the good work.  She declines statin.

## 2023-01-20 ENCOUNTER — TELEPHONE (OUTPATIENT)
Dept: INTERNAL MEDICINE | Facility: CLINIC | Age: 78
End: 2023-01-20
Payer: MEDICARE

## 2023-01-20 DIAGNOSIS — Z12.31 ENCOUNTER FOR SCREENING MAMMOGRAM FOR BREAST CANCER: Primary | ICD-10-CM

## 2023-01-20 NOTE — TELEPHONE ENCOUNTER
----- Message from Val Hinds sent at 1/20/2023  1:50 PM CST -----  Contact: ETRESITA SIMPSON [[id number]]@ [phone]  Caller is requesting to schedule their annual screening mammogram appointment. Order is not listed in Epic.  Please enter order and contact patient to schedule. Mammo Order to [facility]  Would the patient like a call back, or a response through their [facility] portal?:   Call

## 2023-02-07 ENCOUNTER — HOSPITAL ENCOUNTER (OUTPATIENT)
Dept: RADIOLOGY | Facility: HOSPITAL | Age: 78
Discharge: HOME | End: 2023-02-07
Attending: INTERNAL MEDICINE
Payer: MEDICARE

## 2023-02-07 DIAGNOSIS — Z12.31 ENCOUNTER FOR SCREENING MAMMOGRAM FOR BREAST CANCER: ICD-10-CM

## 2023-02-07 PROCEDURE — 77067 PR MAMMO, CAD, SCREENING, BILAT: ICD-10-PCS | Mod: 26,,, | Performed by: RADIOLOGY

## 2023-02-07 PROCEDURE — 77063 BREAST TOMOSYNTHESIS BI: CPT | Mod: 26,,, | Performed by: RADIOLOGY

## 2023-02-07 PROCEDURE — 77063 MAMMO DIGITAL SCREENING BILAT WITH TOMO: ICD-10-PCS | Mod: 26,,, | Performed by: RADIOLOGY

## 2023-02-07 PROCEDURE — 77067 SCR MAMMO BI INCL CAD: CPT | Mod: TC,PO

## 2023-02-07 PROCEDURE — 77067 SCR MAMMO BI INCL CAD: CPT | Mod: 26,,, | Performed by: RADIOLOGY

## 2023-02-28 ENCOUNTER — OFFICE VISIT (OUTPATIENT)
Dept: PSYCHIATRY | Facility: CLINIC | Age: 78
End: 2023-02-28
Payer: MEDICARE

## 2023-02-28 VITALS
HEART RATE: 69 BPM | SYSTOLIC BLOOD PRESSURE: 154 MMHG | WEIGHT: 206.25 LBS | DIASTOLIC BLOOD PRESSURE: 74 MMHG | BODY MASS INDEX: 34.32 KG/M2

## 2023-02-28 DIAGNOSIS — F33.42 MAJOR DEPRESSIVE DISORDER, RECURRENT EPISODE, IN FULL REMISSION: Primary | ICD-10-CM

## 2023-02-28 PROCEDURE — 1159F MED LIST DOCD IN RCRD: CPT | Mod: CPTII,S$GLB,, | Performed by: PSYCHIATRY & NEUROLOGY

## 2023-02-28 PROCEDURE — 99999 PR PBB SHADOW E&M-EST. PATIENT-LVL III: ICD-10-PCS | Mod: PBBFAC,,, | Performed by: PSYCHIATRY & NEUROLOGY

## 2023-02-28 PROCEDURE — 3077F PR MOST RECENT SYSTOLIC BLOOD PRESSURE >= 140 MM HG: ICD-10-PCS | Mod: CPTII,S$GLB,, | Performed by: PSYCHIATRY & NEUROLOGY

## 2023-02-28 PROCEDURE — 1159F PR MEDICATION LIST DOCUMENTED IN MEDICAL RECORD: ICD-10-PCS | Mod: CPTII,S$GLB,, | Performed by: PSYCHIATRY & NEUROLOGY

## 2023-02-28 PROCEDURE — 1160F RVW MEDS BY RX/DR IN RCRD: CPT | Mod: CPTII,S$GLB,, | Performed by: PSYCHIATRY & NEUROLOGY

## 2023-02-28 PROCEDURE — 90833 PSYTX W PT W E/M 30 MIN: CPT | Mod: S$GLB,,, | Performed by: PSYCHIATRY & NEUROLOGY

## 2023-02-28 PROCEDURE — 90833 PR PSYCHOTHERAPY W/PATIENT W/E&M, 30 MIN (ADD ON): ICD-10-PCS | Mod: S$GLB,,, | Performed by: PSYCHIATRY & NEUROLOGY

## 2023-02-28 PROCEDURE — 1160F PR REVIEW ALL MEDS BY PRESCRIBER/CLIN PHARMACIST DOCUMENTED: ICD-10-PCS | Mod: CPTII,S$GLB,, | Performed by: PSYCHIATRY & NEUROLOGY

## 2023-02-28 PROCEDURE — 99213 PR OFFICE/OUTPT VISIT, EST, LEVL III, 20-29 MIN: ICD-10-PCS | Mod: S$GLB,,, | Performed by: PSYCHIATRY & NEUROLOGY

## 2023-02-28 PROCEDURE — 3077F SYST BP >= 140 MM HG: CPT | Mod: CPTII,S$GLB,, | Performed by: PSYCHIATRY & NEUROLOGY

## 2023-02-28 PROCEDURE — 3078F DIAST BP <80 MM HG: CPT | Mod: CPTII,S$GLB,, | Performed by: PSYCHIATRY & NEUROLOGY

## 2023-02-28 PROCEDURE — 3078F PR MOST RECENT DIASTOLIC BLOOD PRESSURE < 80 MM HG: ICD-10-PCS | Mod: CPTII,S$GLB,, | Performed by: PSYCHIATRY & NEUROLOGY

## 2023-02-28 PROCEDURE — 99999 PR PBB SHADOW E&M-EST. PATIENT-LVL III: CPT | Mod: PBBFAC,,, | Performed by: PSYCHIATRY & NEUROLOGY

## 2023-02-28 PROCEDURE — 99213 OFFICE O/P EST LOW 20 MIN: CPT | Mod: S$GLB,,, | Performed by: PSYCHIATRY & NEUROLOGY

## 2023-02-28 RX ORDER — CYANOCOBALAMIN/FOLIC AC/VIT B6 2-2.5-25MG
1 TABLET ORAL DAILY
Qty: 90 TABLET | Refills: 3 | Status: SHIPPED | OUTPATIENT
Start: 2023-02-28 | End: 2023-09-05 | Stop reason: SDUPTHER

## 2023-02-28 RX ORDER — DESVENLAFAXINE SUCCINATE 50 MG/1
50 TABLET, EXTENDED RELEASE ORAL DAILY
Qty: 90 TABLET | Refills: 2 | Status: SHIPPED | OUTPATIENT
Start: 2023-02-28 | End: 2023-09-05 | Stop reason: SDUPTHER

## 2023-03-05 NOTE — PROGRESS NOTES
Outpatient Psychiatry Follow-Up Visit (MD/NP)    2/28/2023    Clinical Status of Patient:  Outpatient (Ambulatory)    Chief Complaint:  Caprice Gutierrez is a 77 y.o. female who presents today for follow-up of depression.  Met with patient.      Interval History and Content of Current Session:  Interim Events/Subjective Report/Content of Current Session:  Pt returned casually dressed and neatly groomed, with smiling affect.  She reported that she is still in remission from her second lifetime episode of major depression in October, 2020.  She remains active and motivated and handles unexpected stressors well.          Medications and most recent lab results were reviewed.  Although she has handled discontinuation of Remeron well, the risk of relapse was discussed as still being high based on her history.  She will remain on current psychotropics unchanged.  She was asked to return in 6 months for a last visit with me before my snf or call as needed.  Blood pressure was discussed as needing better control.  She was asked to talk with her PCP about this if repeat readings are still high.    Psychotherapy:  Target symptoms: depression  Why chosen therapy is appropriate versus another modality: relevant to diagnosis, patient responds to this modality, evidence based practice  Outcome monitoring methods: self-report, observation  Therapeutic intervention type: behavior modifying psychotherapy, supportive psychotherapy  Topics discussed/themes: building skills sets for symptom management, symptom recognition  The patient's response to the intervention is motivated. The patient's progress toward treatment goals is excellent.   Duration of intervention: 27 minutes.    Review of Systems   PSYCHIATRIC: Pertinant items are noted in the narrative.  CARDIOVASCULAR: HTN    Past Medical, Family and Social History: The patient's past medical, family and social history have been reviewed and updated as appropriate within  the electronic medical record - see encounter notes.    Compliance: yes    Side effects: weight gain    Risk Parameters:  Patient reports no suicidal ideation  Patient reports no homicidal ideation  Patient reports no self-injurious behavior  Patient reports no violent behavior    Exam (detailed: at least 9 elements; comprehensive: all 15 elements)   Constitutional  Vitals:  Most recent vital signs, dated less than 90 days prior to this appointment, were reviewed.   Vitals:    02/28/23 0956   BP: (!) 154/74   Pulse: 69   Weight: 93.6 kg (206 lb 3.9 oz)        General:  age appropriate, well nourished, neatly groomed     Musculoskeletal  Muscle Strength/Tone:  no dyskinesia, no dystonia, no tremor   Gait & Station:  non-ataxic     Psychiatric  Speech:  no latency; no press, spontaneous   Mood & Affect:  euthymic  congruent and appropriate   Thought Process:  goal-directed, logical   Associations:  intact   Thought Content:  normal, no suicidality, no homicidality, delusions, or paranoia   Insight:  has awareness of illness   Judgement: behavior is adequate to circumstances   Orientation:  grossly intact   Memory: intact for content of interview   Language: grossly intact   Attention Span & Concentration:  able to focus   Fund of Knowledge:  intact and appropriate to age and level of education     Assessment and Diagnosis   Status/Progress: Based on the examination today, the patient's problem(s) is/are well controlled.  New problems have not been presented today.   Co-morbidities are not complicating management of the primary condition.  There are no active rule-out diagnoses for this patient at this time.     General Impression:  Pt's remains in full remission from depression.      ICD-10-CM ICD-9-CM   1. Major depressive disorder, recurrent episode, in full remission  F33.42 296.36       Intervention/Counseling/Treatment Plan   Medication Management: Continue current medications.   BP control was discussed  again.      Return to Clinic: 6 months

## 2023-05-10 ENCOUNTER — HOSPITAL ENCOUNTER (OUTPATIENT)
Facility: HOSPITAL | Age: 78
Discharge: HOME | End: 2023-05-11
Attending: EMERGENCY MEDICINE | Admitting: INTERNAL MEDICINE
Payer: MEDICARE

## 2023-05-10 ENCOUNTER — TELEPHONE (OUTPATIENT)
Dept: INTERNAL MEDICINE | Facility: CLINIC | Age: 78
End: 2023-05-10
Payer: MEDICARE

## 2023-05-10 DIAGNOSIS — R42 DIZZY: Primary | ICD-10-CM

## 2023-05-10 DIAGNOSIS — G45.9 TRANSIENT ISCHEMIC ATTACK (TIA): ICD-10-CM

## 2023-05-10 DIAGNOSIS — I63.9 STROKE: ICD-10-CM

## 2023-05-10 LAB
ALBUMIN SERPL BCP-MCNC: 4.1 G/DL (ref 3.5–5.2)
ALP SERPL-CCNC: 80 U/L (ref 38–126)
ALT SERPL W/O P-5'-P-CCNC: 18 U/L (ref 10–44)
ANION GAP SERPL CALC-SCNC: 5 MMOL/L (ref 8–16)
APTT PPP: 23.8 SEC (ref 21–32)
AST SERPL-CCNC: 31 U/L (ref 15–46)
BASOPHILS # BLD AUTO: 0.04 K/UL (ref 0–0.2)
BASOPHILS NFR BLD: 0.5 % (ref 0–1.9)
BILIRUB SERPL-MCNC: 0.6 MG/DL (ref 0.1–1)
CALCIUM SERPL-MCNC: 8.9 MG/DL (ref 8.7–10.5)
CHLORIDE SERPL-SCNC: 103 MMOL/L (ref 95–110)
CHOLEST SERPL-MCNC: 217 MG/DL (ref 120–199)
CHOLEST/HDLC SERPL: 3.1 {RATIO} (ref 2–5)
CO2 SERPL-SCNC: 33 MMOL/L (ref 23–29)
CREAT SERPL-MCNC: 0.91 MG/DL (ref 0.5–1.4)
DIFFERENTIAL METHOD: ABNORMAL
EOSINOPHIL # BLD AUTO: 0.1 K/UL (ref 0–0.5)
EOSINOPHIL NFR BLD: 1.2 % (ref 0–8)
ERYTHROCYTE [DISTWIDTH] IN BLOOD BY AUTOMATED COUNT: 15.8 % (ref 11.5–14.5)
EST. GFR  (NO RACE VARIABLE): >60 ML/MIN/1.73 M^2
GLUCOSE SERPL-MCNC: 95 MG/DL (ref 70–110)
HCT VFR BLD AUTO: 35.6 % (ref 37–48.5)
HDLC SERPL-MCNC: 71 MG/DL (ref 40–75)
HDLC SERPL: 32.7 % (ref 20–50)
HGB BLD-MCNC: 11.9 G/DL (ref 12–16)
IMM GRANULOCYTES # BLD AUTO: 0.01 K/UL (ref 0–0.04)
IMM GRANULOCYTES NFR BLD AUTO: 0.1 % (ref 0–0.5)
INR PPP: 1 (ref 0.8–1.2)
LDLC SERPL CALC-MCNC: 130.2 MG/DL (ref 63–159)
LYMPHOCYTES # BLD AUTO: 2.5 K/UL (ref 1–4.8)
LYMPHOCYTES NFR BLD: 31.7 % (ref 18–48)
MCH RBC QN AUTO: 28.4 PG (ref 27–31)
MCHC RBC AUTO-ENTMCNC: 33.4 G/DL (ref 32–36)
MCV RBC AUTO: 85 FL (ref 82–98)
MONOCYTES # BLD AUTO: 1.1 K/UL (ref 0.3–1)
MONOCYTES NFR BLD: 13.8 % (ref 4–15)
NEUTROPHILS # BLD AUTO: 4.1 K/UL (ref 1.8–7.7)
NEUTROPHILS NFR BLD: 52.7 % (ref 38–73)
NONHDLC SERPL-MCNC: 146 MG/DL
NRBC BLD-RTO: 0 /100 WBC
PLATELET # BLD AUTO: 344 K/UL (ref 150–450)
PMV BLD AUTO: 9.5 FL (ref 9.2–12.9)
POTASSIUM SERPL-SCNC: 4 MMOL/L (ref 3.5–5.1)
PROT SERPL-MCNC: 7.8 G/DL (ref 6–8.4)
PROTHROMBIN TIME: 10.6 SEC (ref 9–12.5)
RBC # BLD AUTO: 4.19 M/UL (ref 4–5.4)
SODIUM SERPL-SCNC: 141 MMOL/L (ref 136–145)
TRIGL SERPL-MCNC: 79 MG/DL (ref 30–150)
TSH SERPL DL<=0.005 MIU/L-ACNC: 1.47 UIU/ML (ref 0.4–4)
UUN UR-MCNC: 16 MG/DL (ref 7–17)
WBC # BLD AUTO: 7.74 K/UL (ref 3.9–12.7)

## 2023-05-10 PROCEDURE — G0378 HOSPITAL OBSERVATION PER HR: HCPCS

## 2023-05-10 PROCEDURE — 99900035 HC TECH TIME PER 15 MIN (STAT): Mod: ER

## 2023-05-10 PROCEDURE — 85730 THROMBOPLASTIN TIME PARTIAL: CPT | Mod: ER | Performed by: PHYSICIAN ASSISTANT

## 2023-05-10 PROCEDURE — 80053 COMPREHEN METABOLIC PANEL: CPT | Mod: ER | Performed by: PHYSICIAN ASSISTANT

## 2023-05-10 PROCEDURE — 80061 LIPID PANEL: CPT | Performed by: PHYSICIAN ASSISTANT

## 2023-05-10 PROCEDURE — 93005 ELECTROCARDIOGRAM TRACING: CPT | Mod: ER

## 2023-05-10 PROCEDURE — 94760 N-INVAS EAR/PLS OXIMETRY 1: CPT | Mod: ER

## 2023-05-10 PROCEDURE — 93010 EKG 12-LEAD: ICD-10-PCS | Mod: ,,, | Performed by: INTERNAL MEDICINE

## 2023-05-10 PROCEDURE — 84443 ASSAY THYROID STIM HORMONE: CPT | Mod: ER | Performed by: PHYSICIAN ASSISTANT

## 2023-05-10 PROCEDURE — 93010 ELECTROCARDIOGRAM REPORT: CPT | Mod: ,,, | Performed by: INTERNAL MEDICINE

## 2023-05-10 PROCEDURE — 85610 PROTHROMBIN TIME: CPT | Mod: ER | Performed by: PHYSICIAN ASSISTANT

## 2023-05-10 PROCEDURE — 85025 COMPLETE CBC W/AUTO DIFF WBC: CPT | Mod: ER | Performed by: PHYSICIAN ASSISTANT

## 2023-05-10 PROCEDURE — 99285 EMERGENCY DEPT VISIT HI MDM: CPT | Mod: 25,ER

## 2023-05-10 RX ORDER — AZITHROMYCIN 250 MG/1
TABLET, FILM COATED ORAL
COMMUNITY
Start: 2023-01-17 | End: 2023-05-10

## 2023-05-10 RX ORDER — PROMETHAZINE HYDROCHLORIDE 6.25 MG/5ML
SYRUP ORAL
COMMUNITY
Start: 2023-01-16 | End: 2023-05-10

## 2023-05-10 RX ORDER — DEXTROSE 40 %
15 GEL (GRAM) ORAL
Status: DISCONTINUED | OUTPATIENT
Start: 2023-05-11 | End: 2023-05-11 | Stop reason: HOSPADM

## 2023-05-10 RX ORDER — SODIUM CHLORIDE 0.9 % (FLUSH) 0.9 %
10 SYRINGE (ML) INJECTION
Status: DISCONTINUED | OUTPATIENT
Start: 2023-05-10 | End: 2023-05-11 | Stop reason: HOSPADM

## 2023-05-10 RX ORDER — ATORVASTATIN CALCIUM 40 MG/1
40 TABLET, FILM COATED ORAL DAILY
Status: DISCONTINUED | OUTPATIENT
Start: 2023-05-11 | End: 2023-05-11 | Stop reason: HOSPADM

## 2023-05-10 RX ORDER — ENOXAPARIN SODIUM 100 MG/ML
40 INJECTION SUBCUTANEOUS EVERY 24 HOURS
Status: DISCONTINUED | OUTPATIENT
Start: 2023-05-11 | End: 2023-05-11 | Stop reason: HOSPADM

## 2023-05-10 RX ORDER — PANTOPRAZOLE SODIUM 40 MG/1
40 TABLET, DELAYED RELEASE ORAL DAILY
Status: DISCONTINUED | OUTPATIENT
Start: 2023-05-11 | End: 2023-05-11 | Stop reason: HOSPADM

## 2023-05-10 RX ORDER — POTASSIUM CHLORIDE 20 MEQ/1
40 TABLET, EXTENDED RELEASE ORAL
Status: DISCONTINUED | OUTPATIENT
Start: 2023-05-10 | End: 2023-05-10

## 2023-05-10 RX ORDER — ASPIRIN 81 MG/1
81 TABLET ORAL DAILY
Status: DISCONTINUED | OUTPATIENT
Start: 2023-05-11 | End: 2023-05-11 | Stop reason: HOSPADM

## 2023-05-10 RX ORDER — INSULIN ASPART 100 [IU]/ML
0-5 INJECTION, SOLUTION INTRAVENOUS; SUBCUTANEOUS
Status: DISCONTINUED | OUTPATIENT
Start: 2023-05-11 | End: 2023-05-11 | Stop reason: HOSPADM

## 2023-05-10 RX ORDER — LOSARTAN POTASSIUM 50 MG/1
100 TABLET ORAL DAILY
Status: DISCONTINUED | OUTPATIENT
Start: 2023-05-11 | End: 2023-05-11 | Stop reason: HOSPADM

## 2023-05-10 RX ORDER — GLUCAGON 1 MG
1 KIT INJECTION
Status: DISCONTINUED | OUTPATIENT
Start: 2023-05-11 | End: 2023-05-11 | Stop reason: HOSPADM

## 2023-05-10 RX ORDER — DEXTROSE 40 %
30 GEL (GRAM) ORAL
Status: DISCONTINUED | OUTPATIENT
Start: 2023-05-11 | End: 2023-05-11 | Stop reason: HOSPADM

## 2023-05-10 RX ORDER — AMOXICILLIN 500 MG/1
500 CAPSULE ORAL 3 TIMES DAILY
COMMUNITY
Start: 2023-01-16 | End: 2023-05-10

## 2023-05-10 RX ORDER — MIRTAZAPINE 30 MG/1
30 TABLET, FILM COATED ORAL NIGHTLY PRN
COMMUNITY
Start: 2022-12-08 | End: 2023-09-05 | Stop reason: SDUPTHER

## 2023-05-10 RX ORDER — HYDROCHLOROTHIAZIDE 25 MG/1
25 TABLET ORAL DAILY
Status: DISCONTINUED | OUTPATIENT
Start: 2023-05-11 | End: 2023-05-11 | Stop reason: HOSPADM

## 2023-05-10 NOTE — TELEPHONE ENCOUNTER
----- Message from Samantha Au MA sent at 5/10/2023 11:30 AM CDT -----  Regarding: FW: 2nd message  Contact: [phone]  Hey she returned a call back.  ----- Message -----  From: Cathryn Kasper  Sent: 5/10/2023  11:27 AM CDT  To: Radha REESE Staff  Subject: 2nd message                                      Patient called, stated that something happen to her yesterday and want to talk with pcp or nurse about. She left a message earlier and is concern because has not receive a call back (did not want to give more information about it).  Please advise. Thank you

## 2023-05-10 NOTE — ED PROVIDER NOTES
Encounter Date: 5/10/2023       History     Chief Complaint   Patient presents with    Dizziness     Pt woke up yesterday had an episode of dizziness, nausea, and weakness. States subsided after eating peppermints and resting. Woke up today feeling weak and dizzy again. \   Saw Dr. Rae in [place] was told to come to ER to check for TIA. No slurred speech. No facial droop. Equal hand grasps no drift     Patient is a 77-year-old female with history of hypertension, diabetes controlled with diet, GERD, and depression who presents to the emergency department after an episode of dizziness yesterday.  Patient reports she woke up feeling great.  She reports she did her normal exercising routine.  She reports she ate a good breakfast.  She reports while sitting down watching TV, she developed severe dizziness.  She felt as if the room was spinning.  She went to stand and as she walked, she fell to the ground.  She denies hitting her head.  She reports she was not able to speak for several minutes.  She reports it quickly passed and she then went to her bedroom.  She states she laid down for several hours and ate some peppermint.  She reports she woke up this morning feeling fine, but she was concerned and went to her PCP.  She reports she was instructed by her PCP to come to the emergency department.  She denies any symptoms currently.    The history is provided by the patient.   Review of patient's allergies indicates:   Allergen Reactions    Ace inhibitors Rash    Lisinopril Rash     Past Medical History:   Diagnosis Date    Abnormal Pap smear 1985, 2000    LEEP, [facility]    Allergy     Anemia     Anxiety     Degenerative arthritis of shoulder region     Depression     Diabetes mellitus type II     GERD (gastroesophageal reflux disease)     Headache     Hemangioma of liver     Hx of psychiatric care     Hyperlipidemia     Hypertension     Keloid cicatrix     Rachael     Antidepressant-induced.    Obesity     Psychiatric  problem     Therapy      Past Surgical History:   Procedure Laterality Date    APPENDECTOMY  1980    CERVICAL BIOPSY  W/ LOOP ELECTRODE EXCISION  1985    CHOLECYSTECTOMY  1980    [facility]  2000    COLONOSCOPY N/A 7/12/2016    Procedure: COLONOSCOPY;  Surgeon: Chito Day MD;  [facility]  Service: Endoscopy;  Laterality: N/A;    COLONOSCOPY N/A 8/31/2022    Procedure: COLONOSCOPY;  Surgeon: Lee More MD;  Location: [facility] ENDO (4TH FLR);  Service: Endoscopy;  Laterality: N/A;  fully vaccinated  instructions via mail  clear liquids 4 hr prior-AM Prep-LW    DILATION AND CURETTAGE OF UTERUS  1983    HERNIA REPAIR      LIPOMA RESECTION      TUBAL LIGATION  1985    UMBILICAL HERNIA REPAIR      Wedge resection liver -hemangioma       Family History   Problem Relation Age of Onset    Diabetes Brother     Alcohol abuse Brother     Cirrhosis Brother     Colon cancer Sister 69    Hypertension Sister     Cancer Sister         colon    Diabetes Sister     Arthritis Sister     Cancer Mother         brain    Diabetes Brother     Cancer Brother         lung    Dementia Father     Hypertension Maternal Aunt     Stroke Maternal Aunt     Alcohol abuse Maternal Uncle     Suicide Cousin     Melanoma Neg Hx     Bipolar disorder Neg Hx     Drug abuse Neg Hx     Schizophrenia Neg Hx      Social History     Tobacco Use    Smoking status: Never    Smokeless tobacco: Never   Substance Use Topics    Alcohol use: No    Drug use: No     Review of Systems   Constitutional:  Negative for activity change, appetite change, chills, fatigue and fever.   HENT:  Negative for congestion, ear discharge, ear pain, postnasal drip, rhinorrhea, sore throat and trouble swallowing.    Respiratory:  Negative for cough and shortness of breath.    Cardiovascular:  Negative for chest pain.   Gastrointestinal:  Negative for abdominal pain, blood in stool, constipation, diarrhea, nausea and vomiting.   Genitourinary:  Negative for dysuria,  flank pain and hematuria.   Musculoskeletal:  Negative for back pain, neck pain and neck stiffness.   Skin:  Negative for rash and wound.   Neurological:  Positive for dizziness, speech difficulty and weakness.     Physical Exam     Initial Vitals [05/10/23 1237]   BP Pulse Resp Temp SpO2   (!) 158/90 74 19 99 °F (37.2 °C) 99 %      MAP       --         Physical Exam    Nursing note and vitals reviewed.  Constitutional: She appears well-developed and well-nourished. She is not diaphoretic.  Non-toxic appearance. No distress.   HENT:   Head: Normocephalic.   Right Ear: Hearing and external ear normal.   Left Ear: Hearing and external ear normal.   Nose: Nose normal.   Mouth/Throat: Uvula is midline, oropharynx is clear and moist and mucous membranes are normal. No trismus in the jaw. No uvula swelling. No oropharyngeal exudate.   Eyes: Conjunctivae and EOM are normal. Pupils are equal, round, and reactive to light.   Cardiovascular:  Normal rate and regular rhythm.           Pulmonary/Chest: Breath sounds normal.   Abdominal: Abdomen is soft. Bowel sounds are normal. There is no abdominal tenderness.     Neurological: She is alert and oriented to person, place, and time. She has normal strength. No cranial nerve deficit or sensory deficit. She displays a negative Romberg sign. Coordination and gait normal. GCS score is 15. GCS eye subscore is 4. GCS verbal subscore is 5. GCS motor subscore is 6.   Skin: Skin is warm and dry. Capillary refill takes less than 2 seconds.   Psychiatric: She has a normal mood and affect.       ED Course   Procedures  Labs Reviewed   CBC W/ AUTO DIFFERENTIAL - Abnormal; Notable for the following components:       Result Value    Hemoglobin 11.9 (*)     Hematocrit 35.6 (*)     RDW 15.8 (*)     Mono # 1.1 (*)     All other components within normal limits   COMPREHENSIVE METABOLIC PANEL - Abnormal; Notable for the following components:    CO2 33 (*)     Anion Gap 5 (*)     All other  components within normal limits   PROTIME-INR   TSH   APTT   LIPID PANEL          Imaging Results              CT Head Without Contrast (Final result)  Result time 05/10/23 13:53:39      Final result by Chito Enciso MD (05/10/23 13:53:39)                   Impression:      No acute intracranial hemorrhage, mass or mass effect.  Chronic microvascular ischemic changes.  Findings were discovered at 13:52 on 05/10/2023 and reported to Dr. Homer Mora on 05/10/2023 at 13:53.    All CT scans at this facility use dose modulation, iterative reconstruction, and/or weight based dosing when appropriate to reduce radiation dose to as low as reasonable achievable.      Electronically signed by: Chito Enciso MD  Date:    05/10/2023  Time:    13:53               Narrative:    EXAMINATION:  CT HEAD WITHOUT CONTRAST    CLINICAL HISTORY:  Deficit    TECHNIQUE:  Low dose axial CT images obtained throughout the head without intravenous contrast. Sagittal and coronal reconstructions were performed.    COMPARISON:  08/06/2013    FINDINGS:  Intracranial compartment:    The brain parenchyma demonstrates areas of decreased attenuation with mild to moderate periventricular white matter consistent with chronic microvascular ischemic changes..  No parenchymal mass, hemorrhage, edema or major vascular distribution infarct.  Vascular calcifications are noted.    Mild prominence of the sulci and ventricles are consistent with age-related involutional changes.    No extra-axial blood or fluid collections.    Skull/extracranial contents (limited evaluation): Remote fracture medial wall left orbit.  No fracture. Mastoid air cells and paranasal sinuses are essentially clear.                                       Medications - No data to display  Medical Decision Making:   Initial Assessment:   Urgent evaluation of a 77-year-old female who presents to the emergency department after an episode of dizziness.  Patient is asymptomatic currently.   She is well-appearing.  No neurological deficits appreciated on exam.  With this story and presentation I a.m. concerned for a TIA.  Workup will be done here, but likely patient will be transferred for further workup.  ED Management:  2:33 PM  No concerning findings with labs are head CT.  I have discussed case with [facility] at [facility].  Patient will be transferred for further workup there.  Patient is in agreement.  I have discussed case with supervising physician.    2:46 PM  Pt is now refusing transfer to [facility] and reports she will only go to [facility].  I am on phone with referral center who reports that there are several patients in front of patient to be transferred to [facility] and currently they are on full adult diversion.  I explained that patient is very upset about this.  She is reaching out to another teammember to advice.    2:58 PM  Pt has spoken with her children and has agreed to go to [facility] again.  Other:   I have discussed this case with another health care provider.           ED Course as of 05/10/23 1434   Wed May 10, 2023   1345 EKG: Rate 64.  Normal sinus rhythm.  T-wave changes in V2 V3 and V4.  No STEMI.   MS.  T-wave changes were present on EKG 6/11/20 [RN]      ED Course User Index  [RN] Homer Mora Jr., MD                 Clinical Impression:   Final diagnoses:  [I63.9] Stroke  [R42] Dizzy (Primary)        ED Disposition Condition    Observation Stable                Tess Magana PA-C  05/10/23 1434       Tess Magana PA-C  05/10/23 1447       Tess Magana PA-C  05/10/23 1456

## 2023-05-10 NOTE — TELEPHONE ENCOUNTER
Called and spoke to pt. Pt states she had an episode yesterday after breakfast. States she was walking back after breakfast, the room started to spin and she states she felt very weak. States she tried to walk but couldn't lowered herself to the ground. States she checked her glucose and it was 175 but she did just have OJ and an apple fritter. Pt states after layng down for a short time, she started to feel better. Pt states she still feels nauseated and weeks today. Went to see her local Dr, BP was 150/100, glucose 97. States her Dr told her he thought she was having TIA. Pt denies any current HA, difficulty walking and talking, however still c/o generalized weakness. Explained to pt it would be best to go to ED for an evaluation, pt agreed and states she will have someone drive her.

## 2023-05-10 NOTE — PHARMACY MED REC
"  Admission Medication History     The home medication history was taken by Desi Collado CPhT.    Medication history obtained from, Patient Verified    You may go to "Admission" then "Reconcile Home Medications" tabs to review and/or act upon these items.     The home medication list has been updated by the Pharmacy department.   Please read ALL comments highlighted in yellow.   Please address this information as you see fit.    Feel free to contact us if you have any questions or require assistance.      The medications listed below were removed from the home medication list.  Please reorder if appropriate:  Patient reports no longer taking the following medication(s):  Amoxicillin 500 mg  Azithromycin 250 mg  Zofran 8 mg  Promethazine 6.25 mg/5ml syrup        Desi Collado CPhT.  Ext [phone]             .        "

## 2023-05-10 NOTE — TELEPHONE ENCOUNTER
----- Message from Sana Bruner sent at 5/10/2023  7:34 AM CDT -----  Contact: pt [phone]  Patient states that she experienced dizziness and weakness after drinking orange juice and eating her breakfast. Glucose spiked to 175.    Please call and advise.    Thank You

## 2023-05-11 VITALS
SYSTOLIC BLOOD PRESSURE: 150 MMHG | WEIGHT: 203 LBS | RESPIRATION RATE: 20 BRPM | TEMPERATURE: 97 F | HEART RATE: 70 BPM | DIASTOLIC BLOOD PRESSURE: 81 MMHG | BODY MASS INDEX: 33.82 KG/M2 | HEIGHT: 65 IN | OXYGEN SATURATION: 98 %

## 2023-05-11 LAB
ALBUMIN SERPL BCP-MCNC: 3.3 G/DL (ref 3.5–5.2)
ALP SERPL-CCNC: 69 U/L (ref 55–135)
ALT SERPL W/O P-5'-P-CCNC: 11 U/L (ref 10–44)
ANION GAP SERPL CALC-SCNC: 9 MMOL/L (ref 8–16)
AORTIC ROOT ANNULUS: 2.8 CM
APTT PPP: 25.3 SEC (ref 21–32)
AST SERPL-CCNC: 18 U/L (ref 10–40)
AV INDEX (PROSTH): 0.58
AV MEAN GRADIENT: 6 MMHG
AV PEAK GRADIENT: 10 MMHG
AV VALVE AREA: 2.08 CM2
AV VELOCITY RATIO: 0.61
BASOPHILS # BLD AUTO: 0.03 K/UL (ref 0–0.2)
BASOPHILS NFR BLD: 0.5 % (ref 0–1.9)
BILIRUB SERPL-MCNC: 0.4 MG/DL (ref 0.1–1)
BSA FOR ECHO PROCEDURE: 2.05 M2
BUN SERPL-MCNC: 12 MG/DL (ref 8–23)
CALCIUM SERPL-MCNC: 9.1 MG/DL (ref 8.7–10.5)
CHLORIDE SERPL-SCNC: 104 MMOL/L (ref 95–110)
CO2 SERPL-SCNC: 26 MMOL/L (ref 23–29)
CREAT SERPL-MCNC: 0.9 MG/DL (ref 0.5–1.4)
CV ECHO LV RWT: 0.43 CM
DIFFERENTIAL METHOD: ABNORMAL
DOP CALC AO PEAK VEL: 1.58 M/S
DOP CALC AO VTI: 33.6 CM
DOP CALC LVOT AREA: 3.6 CM2
DOP CALC LVOT DIAMETER: 2.13 CM
DOP CALC LVOT PEAK VEL: 0.96 M/S
DOP CALC LVOT STROKE VOLUME: 69.8 CM3
DOP CALC MV VTI: 17.6 CM
DOP CALCLVOT PEAK VEL VTI: 19.6 CM
E WAVE DECELERATION TIME: 205.15 MSEC
E/A RATIO: 0.5
E/E' RATIO: 10.6 M/S
ECHO LV POSTERIOR WALL: 0.98 CM (ref 0.6–1.1)
EJECTION FRACTION: 55 %
EOSINOPHIL # BLD AUTO: 0.1 K/UL (ref 0–0.5)
EOSINOPHIL NFR BLD: 2.4 % (ref 0–8)
ERYTHROCYTE [DISTWIDTH] IN BLOOD BY AUTOMATED COUNT: 16 % (ref 11.5–14.5)
EST. GFR  (NO RACE VARIABLE): >60 ML/MIN/1.73 M^2
ESTIMATED AVG GLUCOSE: 120 MG/DL (ref 68–131)
FRACTIONAL SHORTENING: 52 % (ref 28–44)
GLUCOSE SERPL-MCNC: 101 MG/DL (ref 70–110)
HBA1C MFR BLD: 5.8 % (ref 4–5.6)
HCT VFR BLD AUTO: 35.9 % (ref 37–48.5)
HGB BLD-MCNC: 11.9 G/DL (ref 12–16)
IMM GRANULOCYTES # BLD AUTO: 0.02 K/UL (ref 0–0.04)
IMM GRANULOCYTES NFR BLD AUTO: 0.3 % (ref 0–0.5)
INR PPP: 1 (ref 0.8–1.2)
INTERVENTRICULAR SEPTUM: 0.89 CM (ref 0.6–1.1)
IVC DIAMETER: 1.3 CM
LA MAJOR: 5.06 CM
LA MINOR: 4.89 CM
LA WIDTH: 4 CM
LEFT ATRIUM SIZE: 3.42 CM
LEFT ATRIUM VOLUME INDEX MOD: 24.1 ML/M2
LEFT ATRIUM VOLUME INDEX: 29.1 ML/M2
LEFT ATRIUM VOLUME MOD: 48.01 CM3
LEFT ATRIUM VOLUME: 57.83 CM3
LEFT INTERNAL DIMENSION IN SYSTOLE: 2.2 CM (ref 2.1–4)
LEFT VENTRICLE DIASTOLIC VOLUME INDEX: 65.69 ML/M2
LEFT VENTRICLE DIASTOLIC VOLUME: 130.72 ML
LEFT VENTRICLE MASS INDEX: 73 G/M2
LEFT VENTRICLE SYSTOLIC VOLUME INDEX: 27.8 ML/M2
LEFT VENTRICLE SYSTOLIC VOLUME: 55.34 ML
LEFT VENTRICULAR INTERNAL DIMENSION IN DIASTOLE: 4.6 CM (ref 3.5–6)
LEFT VENTRICULAR MASS: 144.96 G
LV LATERAL E/E' RATIO: 7.57 M/S
LV SEPTAL E/E' RATIO: 17.67 M/S
LVOT MG: 1.64 MMHG
LVOT MV: 0.59 CM/S
LYMPHOCYTES # BLD AUTO: 1.7 K/UL (ref 1–4.8)
LYMPHOCYTES NFR BLD: 28.5 % (ref 18–48)
MAGNESIUM SERPL-MCNC: 2.2 MG/DL (ref 1.6–2.6)
MCH RBC QN AUTO: 28.1 PG (ref 27–31)
MCHC RBC AUTO-ENTMCNC: 33.1 G/DL (ref 32–36)
MCV RBC AUTO: 85 FL (ref 82–98)
MONOCYTES # BLD AUTO: 0.7 K/UL (ref 0.3–1)
MONOCYTES NFR BLD: 12.2 % (ref 4–15)
MV A" WAVE DURATION": 137.01 MSEC
MV MEAN GRADIENT: 1 MMHG
MV PEAK A VEL: 1.07 M/S
MV PEAK E VEL: 0.53 M/S
MV PEAK GRADIENT: 5 MMHG
MV STENOSIS PRESSURE HALF TIME: 59.49 MS
MV VALVE AREA BY CONTINUITY EQUATION: 3.97 CM2
MV VALVE AREA P 1/2 METHOD: 3.7 CM2
NEUTROPHILS # BLD AUTO: 3.3 K/UL (ref 1.8–7.7)
NEUTROPHILS NFR BLD: 56.1 % (ref 38–73)
NRBC BLD-RTO: 0 /100 WBC
OHS LV EJECTION FRACTION SIMPSONS BIPLANE MOD: 5 %
PLATELET # BLD AUTO: 334 K/UL (ref 150–450)
PMV BLD AUTO: 9.6 FL (ref 9.2–12.9)
POTASSIUM SERPL-SCNC: 3.7 MMOL/L (ref 3.5–5.1)
PROT SERPL-MCNC: 7 G/DL (ref 6–8.4)
PROTHROMBIN TIME: 11 SEC (ref 9–12.5)
PULM VEIN S/D RATIO: 1.18
PV MV: 0.83 M/S
PV PEAK D VEL: 0.51 M/S
PV PEAK S VEL: 0.6 M/S
PV PEAK VELOCITY: 1.02 CM/S
RA MAJOR: 4.43 CM
RA PRESSURE: 3 MMHG
RA WIDTH: 3.3 CM
RBC # BLD AUTO: 4.24 M/UL (ref 4–5.4)
RIGHT VENTRICULAR END-DIASTOLIC DIMENSION: 3.06 CM
RV TISSUE DOPPLER FREE WALL SYSTOLIC VELOCITY 1 (APICAL 4 CHAMBER VIEW): 0.02 CM/S
SODIUM SERPL-SCNC: 139 MMOL/L (ref 136–145)
TDI LATERAL: 0.07 M/S
TDI SEPTAL: 0.03 M/S
TDI: 0.05 M/S
WBC # BLD AUTO: 5.92 K/UL (ref 3.9–12.7)

## 2023-05-11 PROCEDURE — 80053 COMPREHEN METABOLIC PANEL: CPT | Performed by: INTERNAL MEDICINE

## 2023-05-11 PROCEDURE — 63600175 PHARM REV CODE 636 W HCPCS: Performed by: INTERNAL MEDICINE

## 2023-05-11 PROCEDURE — 97165 OT EVAL LOW COMPLEX 30 MIN: CPT

## 2023-05-11 PROCEDURE — 99900035 HC TECH TIME PER 15 MIN (STAT)

## 2023-05-11 PROCEDURE — 96372 THER/PROPH/DIAG INJ SC/IM: CPT | Performed by: INTERNAL MEDICINE

## 2023-05-11 PROCEDURE — 85025 COMPLETE CBC W/AUTO DIFF WBC: CPT | Performed by: INTERNAL MEDICINE

## 2023-05-11 PROCEDURE — 36415 COLL VENOUS BLD VENIPUNCTURE: CPT | Performed by: INTERNAL MEDICINE

## 2023-05-11 PROCEDURE — 25500020 PHARM REV CODE 255: Performed by: INTERNAL MEDICINE

## 2023-05-11 PROCEDURE — 25000003 PHARM REV CODE 250: Performed by: INTERNAL MEDICINE

## 2023-05-11 PROCEDURE — G0378 HOSPITAL OBSERVATION PER HR: HCPCS

## 2023-05-11 PROCEDURE — 85730 THROMBOPLASTIN TIME PARTIAL: CPT | Performed by: INTERNAL MEDICINE

## 2023-05-11 PROCEDURE — 83735 ASSAY OF MAGNESIUM: CPT | Performed by: INTERNAL MEDICINE

## 2023-05-11 PROCEDURE — 94761 N-INVAS EAR/PLS OXIMETRY MLT: CPT

## 2023-05-11 PROCEDURE — 83036 HEMOGLOBIN GLYCOSYLATED A1C: CPT | Performed by: INTERNAL MEDICINE

## 2023-05-11 PROCEDURE — 85610 PROTHROMBIN TIME: CPT | Performed by: INTERNAL MEDICINE

## 2023-05-11 PROCEDURE — 97161 PT EVAL LOW COMPLEX 20 MIN: CPT

## 2023-05-11 RX ORDER — ATORVASTATIN CALCIUM 10 MG/1
40 TABLET, FILM COATED ORAL DAILY
Qty: 360 TABLET | Refills: 3 | Status: SHIPPED | OUTPATIENT
Start: 2023-05-11 | End: 2024-05-10

## 2023-05-11 RX ADMIN — PANTOPRAZOLE SODIUM 40 MG: 40 TABLET, DELAYED RELEASE ORAL at 10:05

## 2023-05-11 RX ADMIN — IOHEXOL 100 ML: 350 INJECTION, SOLUTION INTRAVENOUS at 02:05

## 2023-05-11 RX ADMIN — ATORVASTATIN CALCIUM 40 MG: 40 TABLET, FILM COATED ORAL at 10:05

## 2023-05-11 RX ADMIN — LOSARTAN POTASSIUM 100 MG: 50 TABLET, FILM COATED ORAL at 10:05

## 2023-05-11 RX ADMIN — ENOXAPARIN SODIUM 40 MG: 40 INJECTION SUBCUTANEOUS at 04:05

## 2023-05-11 RX ADMIN — HYDROCHLOROTHIAZIDE 25 MG: 25 TABLET ORAL at 10:05

## 2023-05-11 RX ADMIN — ASPIRIN 81 MG: 81 TABLET, COATED ORAL at 10:05

## 2023-05-11 NOTE — HPI
Caprice Gutierrez is 77-year-old  female with known essential hypertension, type 2 DM and GERD transferred from outside facility for transient episode dizziness and nausea.    Reports being in her usual state of health until yesterday morning after she noticed acute episode of dizziness shortly after breakfast.  She describes this as room spinning around her.  This was accompanied by nausea.  She tried to walk but was unable to do so and helped herself to the ground.  She is unsure of difficulty with speech.  Symptoms resolved spontaneously and she was able to carry on her daily activities.  The next morning she felt sluggish and saw her PCP who advised to seek medical attention in the ED. Patient denies prior history of TIA or CVA.  She denies focal weakness, sensory deficit or paresthesias or vision problems. No ear fullness or hearing loss.     Rest of the 10 point review of systems is negative except as mentioned above.

## 2023-05-11 NOTE — PLAN OF CARE
SW met with pt at bedside to complete assessment. Pt is AxO x3  and able to verbally answer assessment questions. Pt is able to confirm demographic information and lives at home with her spouse. Pt confirmed physical address. Pt reports living at home with spouse and feeling safe. Pt added oldest daughter Mary [phone] to emergency contact list. Pt reports while at home being independent of ADL's and no DME in use. Pt has no active agencies and can drive. Pt family to bring home. SW updated whiteboard with [facility] name and contact information. SW confirmed pt understanding of Observation unit and expected discharge plan. SW will continue to follow pt throughout care and assist with any discharge needs.         05/11/23 1102   Discharge Planning   Assessment Type Discharge Planning Brief Assessment   Resource/Environmental Concerns none   Support Systems Spouse/significant other;Family members   Equipment Currently Used at Home none   Current Living Arrangements home   Patient/Family Anticipates Transition to home with family   Patient/Family Anticipated Services at Transition none   DME Needed Upon Discharge  none   Discharge Plan A Home with family       Future Appointments   Date Time Provider Department Center   9/5/2023 10:00 AM Ruel Rodriguez Jr., MD [facility]  [phone]

## 2023-05-11 NOTE — SUBJECTIVE & OBJECTIVE
Past Medical History:   Diagnosis Date    Abnormal Pap smear 1985, 2000    [facility], [facility]    Allergy     Anemia     Anxiety     Degenerative arthritis of shoulder region     Depression     Diabetes mellitus type II     GERD (gastroesophageal reflux disease)     Headache     Hemangioma of liver     Hx of psychiatric care     Hyperlipidemia     Hypertension     Keloid cicatrix     Rachael     Antidepressant-induced.    Obesity     Psychiatric problem     Therapy        Past Surgical History:   Procedure Laterality Date    APPENDECTOMY  1980    CERVICAL BIOPSY  W/ LOOP ELECTRODE EXCISION  1985    CHOLECYSTECTOMY  1980    [facility]  2000    COLONOSCOPY N/A 7/12/2016    Procedure: COLONOSCOPY;  Surgeon: Chito Day MD;  [facility]  Service: Endoscopy;  Laterality: N/A;    COLONOSCOPY N/A 8/31/2022    Procedure: COLONOSCOPY;  Surgeon: Lee More MD;  [facility]  Service: Endoscopy;  Laterality: N/A;  fully vaccinated  instructions via mail  clear liquids 4 hr prior-AM Prep-LW    DILATION AND CURETTAGE OF UTERUS  1983    HERNIA REPAIR      LIPOMA RESECTION      TUBAL LIGATION  1985    UMBILICAL HERNIA REPAIR      Wedge resection liver -hemangioma         Review of patient's allergies indicates:   Allergen Reactions    Ace inhibitors Rash    Lisinopril Rash       No current facility-administered medications on file prior to encounter.     Current Outpatient Medications on File Prior to Encounter   Medication Sig    aspirin (ECOTRIN) 81 MG EC tablet Take 81 mg by mouth once daily.    desvenlafaxine succinate (PRISTIQ) 50 MG Tb24 Take 1 tablet (50 mg total) by mouth once daily.    ergocalciferol (VITAMIN D2) 50,000 unit Cap TAKE ONE CAPSULE BY MOUTH EVERY 7 DAYS (Patient taking differently: Take 50,000 Units by mouth every Thursday.)    esomeprazole (NEXIUM) 20 MG capsule Take 20 mg by mouth before breakfast.    folic acid-vit B6-vit B12 2.5-25-2 mg (NIVA-FOL) 2.5-25-2 mg Tab Take 1  tablet by mouth once daily.    hydroCHLOROthiazide (HYDRODIURIL) 25 MG tablet TAKE ONE TABLET BY MOUTH EVERY DAY    irbesartan (AVAPRO) 300 MG tablet TAKE ONE TABLET BY MOUTH DAILY IN THE EVENING (Patient taking differently: Take 300 mg by mouth once daily.)    mirtazapine (REMERON) 30 MG tablet Take 30 mg by mouth nightly as needed.    blood sugar diagnostic (CONTOUR TEST STRIPS) Strp TEST ONE TO 3 TIMES A DAY AS DIRECTED    MICROLET LANCET Misc USE ONE TO 3 TIMES DAILY    [DISCONTINUED] amoxicillin (AMOXIL) 500 MG capsule Take 500 mg by mouth 3 (three) times daily.    [DISCONTINUED] azithromycin (Z-ROBERT) 250 MG tablet TAKE 2 TABLETS BY MOUTH ON DAY 1, THEN TAKE 1 TABLET DAILY ON DAYS 2-5    [DISCONTINUED] ondansetron (ZOFRAN) 8 MG tablet Take 1 tablet (8 mg total) by mouth every 12 (twelve) hours as needed for Nausea. (Patient not taking: Reported on 2022)    [DISCONTINUED] promethazine (PHENERGAN) 6.25 mg/5 mL syrup SMARTSI Teaspoon By Mouth Twice Daily    [DISCONTINUED] sertraline (ZOLOFT) 25 MG tablet Take 1 tablet (25 mg total) by mouth once daily.     Family History       Problem Relation (Age of Onset)    Alcohol abuse Brother, Maternal Uncle    Arthritis Sister    Cancer Sister, Mother, Brother    Cirrhosis Brother    Colon cancer Sister (69)    Dementia Father    Diabetes Brother, Sister, Brother    Hypertension Sister, Maternal Aunt    Stroke Maternal Aunt    Suicide Cousin          Tobacco Use    Smoking status: Never    Smokeless tobacco: Never   Substance and Sexual Activity    Alcohol use: No    Drug use: No    Sexual activity: Never     Birth control/protection: Post-menopausal       Objective:     Vital Signs (Most Recent):  Temp: 98.6 °F (37 °C) (05/10/23 2334)  Pulse: (!) 58 (05/10/23 2334)  Resp: 18 (05/10/23 2334)  BP: (!) 169/79 (05/10/23 2334)  SpO2: 97 % (05/10/23 2334) Vital Signs (24h Range):  Temp:  [97.6 °F (36.4 °C)-99 °F (37.2 °C)] 98.6 °F (37 °C)  Pulse:  [57-74] 58  Resp:   [16-42] 18  SpO2:  [97 %-100 %] 97 %  BP: (144-179)/(70-90) 169/79     Weight: 92.3 kg (203 lb 7.8 oz)  Body mass index is 33.86 kg/m².     Physical Exam         General: Patient resting comfortably in no acute distress. Appears stated age  Head: Normocephalic and atraumatic   Eyes:  No conjunctival pallor. No scleral icterus.  Mouth: Oral mucosa moist   Lungs: CTA. Good air entry.  Cor: Regular rate and rhythm. No murmurs. No pedal edema.  Abd: Soft. Nontender  Musculoskeletal: No arthropathy, deformity.  Skin: No rashes, swelling, or erythema.  Neuro: A&O x 3.  Speech is clear and coherent.  Cranial nerves 2-12 are grossly intact.  Motor strength is 5/5 in all extremities.  No sensory deficit.  No dysmetria or dysdiadochokinesia.  Reflexes are 2+ in the level of knee  Ext: No clubbing. No cyanosis. Peripheral pulses +  Psych: Normal mood and affect. Memory intact     Significant Labs: All pertinent labs within the past 24 hours have been reviewed.  CBC:   Recent Labs   Lab 05/10/23  1320   WBC 7.74   HGB 11.9*   HCT 35.6*        CMP:   Recent Labs   Lab 05/10/23  1320      K 4.0      CO2 33*   GLU 95   BUN 16   CREATININE 0.91   CALCIUM 8.9   PROT 7.8   ALBUMIN 4.1   BILITOT 0.6   ALKPHOS 80   AST 31   ALT 18   ANIONGAP 5*       Significant Imaging: I have reviewed all pertinent imaging results/findings within the past 24 hours.

## 2023-05-11 NOTE — NURSING
Avs reviewed.  Patient and patient's daughter verbalized understanding on medications, signs and symptoms on stroke, diet, activity, and follow-up appointments.  Daughter is at bedside and will accompany patient home.  Wheelchair per Transport called.

## 2023-05-11 NOTE — H&P
[facility] Medicine  History & Physical    Patient Name: Caprice Gutierrez  MRN: [id number]  Patient Class: OP- Observation  Admission Date: 5/10/2023  Attending Physician: Juan Manuel Archibald MD  Primary Care Provider: Nayely Garcia MD         Patient information was obtained from patient, past medical records and ER records.     Subjective:     Principal Problem:TIA (transient ischemic attack)    Chief Complaint:   Chief Complaint   Patient presents with    Dizziness     Pt woke up yesterday had an episode of dizziness, nausea, and weakness. States subsided after eating peppermints and resting. Woke up today feeling weak and dizzy again. \   Saw Dr. Rae in [place] was told to come to ER to check for TIA. No slurred speech. No facial droop. Equal hand grasps no drift        HPI: Patient is a 77-year-old  female with known essential hypertension, type 2 DM and GERD transferred from outside facility for transient episode dizziness and nausea.    Reports being in her usual state of health until yesterday morning after she noticed acute episode of dizziness shortly after breakfast.  She describes this as room spinning around her.  This was accompanied by nausea.  She tried to walk but was unable to do so and helped herself to the ground.  She is unsure of difficulty with speech.  Symptoms resolved spontaneously and she was able to carry on her daily activities.  The next morning she felt sluggish and saw her PCP who advised to seek medical attention in the ED. Patient denies prior history of TIA or CVA.  She denies focal weakness, sensory deficit or paresthesias or vision problems. No ear fullness or hearing loss.     Rest of the 10 point review of systems is negative except as mentioned above.      Past Medical History:   Diagnosis Date    Abnormal Pap smear 1985, 2000    LEEP, [facility]    Allergy     Anemia     Anxiety     Degenerative arthritis of shoulder region      Depression     Diabetes mellitus type II     GERD (gastroesophageal reflux disease)     Headache     Hemangioma of liver     Hx of psychiatric care     Hyperlipidemia     Hypertension     Keloid cicatrix     Rachael     Antidepressant-induced.    Obesity     Psychiatric problem     Therapy        Past Surgical History:   Procedure Laterality Date    APPENDECTOMY  1980    CERVICAL BIOPSY  W/ LOOP ELECTRODE EXCISION  1985    CHOLECYSTECTOMY  1980    [facility]  2000    COLONOSCOPY N/A 7/12/2016    Procedure: COLONOSCOPY;  Surgeon: Chito Day MD;  [facility]  Service: Endoscopy;  Laterality: N/A;    COLONOSCOPY N/A 8/31/2022    Procedure: COLONOSCOPY;  Surgeon: Lee More MD;  [facility]  Service: Endoscopy;  Laterality: N/A;  fully vaccinated  instructions via mail  clear liquids 4 hr prior-AM Prep-LW    DILATION AND CURETTAGE OF UTERUS  1983    HERNIA REPAIR      LIPOMA RESECTION      TUBAL LIGATION  1985    UMBILICAL HERNIA REPAIR      Wedge resection liver -hemangioma         Review of patient's allergies indicates:   Allergen Reactions    Ace inhibitors Rash    Lisinopril Rash       No current facility-administered medications on file prior to encounter.     Current Outpatient Medications on File Prior to Encounter   Medication Sig    aspirin (ECOTRIN) 81 MG EC tablet Take 81 mg by mouth once daily.    desvenlafaxine succinate (PRISTIQ) 50 MG Tb24 Take 1 tablet (50 mg total) by mouth once daily.    ergocalciferol (VITAMIN D2) 50,000 unit Cap TAKE ONE CAPSULE BY MOUTH EVERY 7 DAYS (Patient taking differently: Take 50,000 Units by mouth every Thursday.)    esomeprazole (NEXIUM) 20 MG capsule Take 20 mg by mouth before breakfast.    folic acid-vit B6-vit B12 2.5-25-2 mg (NIVA-FOL) 2.5-25-2 mg Tab Take 1 tablet by mouth once daily.    hydroCHLOROthiazide (HYDRODIURIL) 25 MG tablet TAKE ONE TABLET BY MOUTH EVERY DAY    irbesartan (AVAPRO) 300  MG tablet TAKE ONE TABLET BY MOUTH DAILY IN THE EVENING (Patient taking differently: Take 300 mg by mouth once daily.)    mirtazapine (REMERON) 30 MG tablet Take 30 mg by mouth nightly as needed.    blood sugar diagnostic (CONTOUR TEST STRIPS) Strp TEST ONE TO 3 TIMES A DAY AS DIRECTED    MICROLET LANCET Misc USE ONE TO 3 TIMES DAILY    [DISCONTINUED] amoxicillin (AMOXIL) 500 MG capsule Take 500 mg by mouth 3 (three) times daily.    [DISCONTINUED] azithromycin (Z-ROBERT) 250 MG tablet TAKE 2 TABLETS BY MOUTH ON DAY 1, THEN TAKE 1 TABLET DAILY ON DAYS 2-5    [DISCONTINUED] ondansetron (ZOFRAN) 8 MG tablet Take 1 tablet (8 mg total) by mouth every 12 (twelve) hours as needed for Nausea. (Patient not taking: Reported on 2022)    [DISCONTINUED] promethazine (PHENERGAN) 6.25 mg/5 mL syrup SMARTSI Teaspoon By Mouth Twice Daily    [DISCONTINUED] sertraline (ZOLOFT) 25 MG tablet Take 1 tablet (25 mg total) by mouth once daily.     Family History       Problem Relation (Age of Onset)    Alcohol abuse Brother, Maternal Uncle    Arthritis Sister    Cancer Sister, Mother, Brother    Cirrhosis Brother    Colon cancer Sister (69)    Dementia Father    Diabetes Brother, Sister, Brother    Hypertension Sister, Maternal Aunt    Stroke Maternal Aunt    Suicide Cousin          Tobacco Use    Smoking status: Never    Smokeless tobacco: Never   Substance and Sexual Activity    Alcohol use: No    Drug use: No    Sexual activity: Never     Birth control/protection: Post-menopausal       Objective:     Vital Signs (Most Recent):  Temp: 98.6 °F (37 °C) (05/10/23 2334)  Pulse: (!) 58 (05/10/23 2334)  Resp: 18 (05/10/23 2334)  BP: (!) 169/79 (05/10/23 2334)  SpO2: 97 % (05/10/23 2334) Vital Signs (24h Range):  Temp:  [97.6 °F (36.4 °C)-99 °F (37.2 °C)] 98.6 °F (37 °C)  Pulse:  [57-74] 58  Resp:  [16-42] 18  SpO2:  [97 %-100 %] 97 %  BP: (144-179)/(70-90) 169/79     Weight: 92.3 kg (203 lb 7.8 oz)  Body mass index is 33.86  kg/m².     Physical Exam         General: Patient resting comfortably in no acute distress. Appears stated age  Head: Normocephalic and atraumatic   Eyes:  No conjunctival pallor. No scleral icterus.  Mouth: Oral mucosa moist   Lungs: CTA. Good air entry.  Cor: Regular rate and rhythm. No murmurs. No pedal edema.  Abd: Soft. Nontender  Musculoskeletal: No arthropathy, deformity.  Skin: No rashes, swelling, or erythema.  Neuro: A&O x 3.  Speech is clear and coherent.  Cranial nerves 2-12 are grossly intact.  Motor strength is 5/5 in all extremities.  No sensory deficit.  No dysmetria or dysdiadochokinesia.  Reflexes are 2+ in the level of knee  Ext: No clubbing. No cyanosis. Peripheral pulses +  Psych: Normal mood and affect. Memory intact     Significant Labs: All pertinent labs within the past 24 hours have been reviewed.  CBC:   Recent Labs   Lab 05/10/23  1320   WBC 7.74   HGB 11.9*   HCT 35.6*        CMP:   Recent Labs   Lab 05/10/23  1320      K 4.0      CO2 33*   GLU 95   BUN 16   CREATININE 0.91   CALCIUM 8.9   PROT 7.8   ALBUMIN 4.1   BILITOT 0.6   ALKPHOS 80   AST 31   ALT 18   ANIONGAP 5*       Significant Imaging: I have reviewed all pertinent imaging results/findings within the past 24 hours.    Assessment/Plan:     * TIA (transient ischemic attack)    Antithrombotics for secondary stroke prevention: Antiplatelets: Aspirin: 81 mg daily    Statins for secondary stroke prevention and hyperlipidemia, if present:   Statins: Atorvastatin- 40 mg daily    Aggressive risk factor modification: HTN, DM, Diet, Exercise     Rehab efforts: The patient has been evaluated by a stroke team provider and the therapy needs have been fully considered based off the presenting complaints and exam findings. The following therapy evaluations are needed: PT evaluate and treat, OT evaluate and treat    Diagnostics ordered/pending: Carotid ultrasound to assess vasculature, HgbA1C to assess blood glucose levels,  MRI head without contrast to assess brain parenchyma, TTE to assess cardiac function/status     VTE prophylaxis: Enoxaparin 40 mg SQ every 24 hours    BP parameters: TIA: SBP <220 until imaging confirmation of no infarct     Neurology consult     Diabetes mellitus type 2 in obese  Patient's FSGs are controlled on current medication regimen.  Last A1c reviewed-   Lab Results   Component Value Date    HGBA1C 6.6 (H) 08/02/2022     Most recent fingerstick glucose reviewed- No results for input(s): POCTGLUCOSE in the last 24 hours.  Current correctional scale  Low  Maintain anti-hyperglycemic dose as follows-   Antihyperglycemics (From admission, onward)    Start     Stop Route Frequency Ordered    05/11/23 0045  insulin aspart U-100 pen 0-5 Units         -- SubQ Before meals & nightly PRN 05/10/23 2345        Hold Oral hypoglycemics while patient is in the hospital.    Hyperlipidemia  Lipid panel noted   Started atorvastatin 40 mg once daily      GERD (gastroesophageal reflux disease)  Chronic condition  Continue PPI        VTE Risk Mitigation (From admission, onward)         Ordered     enoxaparin injection 40 mg  Daily         05/10/23 2303     IP VTE HIGH RISK PATIENT  Once         05/10/23 2303     Place sequential compression device  Until discontinued         05/10/23 2303                   On 05/10/2023, patient should be placed in hospital observation services under my care.        Juan Manuel Archibald MD  Department of Hospital Medicine  [facility]

## 2023-05-11 NOTE — ASSESSMENT & PLAN NOTE
Patient's FSGs are controlled on current medication regimen.  Last A1c reviewed-   Lab Results   Component Value Date    HGBA1C 6.6 (H) 08/02/2022     Most recent fingerstick glucose reviewed- No results for input(s): POCTGLUCOSE in the last 24 hours.  Current correctional scale  Low  Maintain anti-hyperglycemic dose as follows-   Antihyperglycemics (From admission, onward)    Start     Stop Route Frequency Ordered    05/11/23 0045  insulin aspart U-100 pen 0-5 Units         -- SubQ Before meals & nightly PRN 05/10/23 2345        Hold Oral hypoglycemics while patient is in the hospital.

## 2023-05-11 NOTE — ASSESSMENT & PLAN NOTE
Antithrombotics for secondary stroke prevention: Antiplatelets: Aspirin: 81 mg daily    Statins for secondary stroke prevention and hyperlipidemia, if present:   Statins: Atorvastatin- 40 mg daily    Aggressive risk factor modification: HTN, DM, Diet, Exercise     Rehab efforts: The patient has been evaluated by a stroke team provider and the therapy needs have been fully considered based off the presenting complaints and exam findings. The following therapy evaluations are needed: PT evaluate and treat, OT evaluate and treat    Diagnostics ordered/pending: Carotid ultrasound to assess vasculature, HgbA1C to assess blood glucose levels, MRI head without contrast to assess brain parenchyma, TTE to assess cardiac function/status     VTE prophylaxis: Enoxaparin 40 mg SQ every 24 hours    BP parameters: TIA: SBP <220 until imaging confirmation of no infarct     Neurology consult

## 2023-05-11 NOTE — PT/OT/SLP EVAL
Physical Therapy Evaluation and Discharge Note    Patient Name:  Caprice Gutierrez   MRN:  [id number]    Recommendations:     Discharge Recommendations: home  Discharge Equipment Recommendations: none   Barriers to discharge: None    Assessment:     Caprice Gutierrez is a 77 y.o. female admitted with a medical diagnosis of TIA (transient ischemic attack). .  At this time, patient is functioning at their prior level of function and does not require further acute PT services.      Pt ambulates in the muro independently with no AD required. Pt reports she is feeling back to her baseline. No balance/strength/sensation/coordination deficits noted. D/c home with no needs. D/c PT.     Recent Surgery: * No surgery found *      Plan:     During this hospitalization, patient does not require further acute PT services.  Please re-consult if situation changes.      Subjective     Chief Complaint: none  Patient/Family Comments/goals: pt reports her symptoms have resolved and she is back at her baseline  Pain/Comfort:  Pain Rating 1: 0/10  Pain Rating Post-Intervention 1: 0/10    Patients cultural, spiritual, Jehovah's witness conflicts given the current situation: no    Living Environment:  Pt lives with her spouse in a [facility], [address], and T/S  Prior to admission, patients level of function was Independent, driving, retired, and is active including walking 6x/wk and gardening. Pt reports no falls.  Equipment used at home: none.  DME owned (not currently used): none.  Upon discharge, patient will have assistance from spouse.    Objective:     Communicated with nsg prior to session.  Patient found up in chair with   upon PT entry to room.    General Precautions: Standard, fall    Orthopedic Precautions:N/A   Braces: N/A  Respiratory Status: Room air    Exams:  Cognitive Exam:  Patient is oriented to Person, Place, Time, and Situation  No facial droop or speech deficits noted  Postural Exam:  Patient presented with the following  abnormalities:    -       No postural abnormalities identified  Sensation:    -       Intact  light/touch BLE  Skin Integrity/Edema:      -       Skin integrity: Visible skin intact  -       Edema: None noted BLE  RLE ROM: WFL  RLE Strength: WFL  LLE ROM: WFL  LLE Strength: WFL  SLS and romberg with eyes open/closed WFL    Functional Mobility:  Transfers:     Sit to Stand:  independence with no AD  Gait: Pt ambulated ~150 ft Independently with no AD   Balance: Good static/dynamic sitting/standing    AM-PAC 6 CLICK MOBILITY  Total Score:24       Treatment and Education:  Pt educated on role of PT/POC and signs and symptoms of stroke and seeking medical assistance promptly   Pt ambulated in muro as reported above  Pt returned seated in chair.     AM-PAC 6 CLICK MOBILITY  Total Score:24     Patient left up in chair with all lines intact and call button in reach.    GOALS:   Multidisciplinary Problems       Physical Therapy Goals          Problem: Physical Therapy    Goal Priority Disciplines Outcome Goal Variances Interventions   Physical Therapy Goal     PT, PT/OT Adequate for Care Transition                         History:     Past Medical History:   Diagnosis Date    Abnormal Pap smear 1985, 2000    LEEP, [facility]    Allergy     Anemia     Anxiety     Degenerative arthritis of shoulder region     Depression     Diabetes mellitus type II     GERD (gastroesophageal reflux disease)     Headache     Hemangioma of liver     Hx of psychiatric care     Hyperlipidemia     Hypertension     Keloid cicatrix     Rachael     Antidepressant-induced.    Obesity     Psychiatric problem     Therapy        Past Surgical History:   Procedure Laterality Date    APPENDECTOMY  1980    CERVICAL BIOPSY  W/ LOOP ELECTRODE EXCISION  1985    CHOLECYSTECTOMY  1980    [facility]  2000    COLONOSCOPY N/A 7/12/2016    Procedure: COLONOSCOPY;  Surgeon: Chito Day MD;  Location: [address];  Service: Endoscopy;  Laterality: N/A;    COLONOSCOPY N/A  8/31/2022    Procedure: COLONOSCOPY;  Surgeon: Lee More MD;  Location: [facility] ([address] [place] [address]);  Service: Endoscopy;  Laterality: N/A;  fully vaccinated  instructions via mail  clear liquids 4 hr prior-AM Prep-LW    DILATION AND CURETTAGE OF UTERUS  1983    HERNIA REPAIR      LIPOMA RESECTION      TUBAL LIGATION  1985    UMBILICAL HERNIA REPAIR      Wedge resection liver -hemangioma         Time Tracking:     PT Received On: 05/11/23  PT Start Time: 1402     PT Stop Time: 1410  PT Total Time (min): 8 min     Billable Minutes: Evaluation 8 (co-eval with OT)      05/11/2023

## 2023-05-11 NOTE — PT/OT/SLP EVAL
Occupational Therapy   Evaluation and Discharge Note    Name: Caprice Gutierrez  MRN: [id number]  Admitting Diagnosis: TIA (transient ischemic attack)  Recent Surgery: * No surgery found *      Recommendations:     Discharge Recommendations: home  Discharge Equipment Recommendations: none  Barriers to discharge:  None    Assessment:     Caprice Gutierrez is a 77 y.o. female with a medical diagnosis of TIA (transient ischemic attack). At this time, patient is functioning at their prior level of function and does not require further acute OT services.     Pt performing at baseline for ADLs and functional mobility. BUE ROM/MMT/FMC/GMC/sensation all WFL. Pt reports no visual deficits. Pt does not require further OT services at this time. D/c OT    Plan:     During this hospitalization, patient does not require further acute OT services.  Please re-consult if situation changes.    Plan of Care Reviewed with: patient    Subjective     Chief Complaint: no complaints   Patient/Family Comments/goals: return home     Occupational Profile:  Living Environment:with spouse in [facility], no steps to enter, t/s combo   Previous level of function: independent; drives; gardens   Equipment Used at home: none  Assistance upon Discharge: from spouse     Pain/Comfort:  Pain Rating 1: 0/10    Patients cultural, spiritual, Voodoo conflicts given the current situation:      Objective:     Communicated with: nsg prior to session.  Patient found up in chair with   upon OT entry to room.    General Precautions: Standard, fall  Orthopedic Precautions: N/A  Braces: N/A  Respiratory Status: Room air     Occupational Performance:    Bed Mobility:    C     Functional Mobility/Transfers:  Patient completed Sit <> Stand Transfer with independence  with  no assistive device   Functional Mobility: independent no AD     Activities of Daily Living:  Pt fully dressed     Cognitive/Visual Perceptual:  WFL     Physical Exam:  [facility] 6 Click  ADL:  AMPAC Total Score: 24    Treatment & Education:  Pt educated on signs/symptoms of CVA     Patient left up in chair with all lines intact, call button in reach, and nsg notified    GOALS:   Multidisciplinary Problems       Occupational Therapy Goals          Problem: Occupational Therapy    Goal Priority Disciplines Outcome Interventions   Occupational Therapy Goal     OT, PT/OT Adequate for Care Transition                        History:     Past Medical History:   Diagnosis Date    Abnormal Pap smear 1985, 2000    LEEP, [facility]    Allergy     Anemia     Anxiety     Degenerative arthritis of shoulder region     Depression     Diabetes mellitus type II     GERD (gastroesophageal reflux disease)     Headache     Hemangioma of liver     Hx of psychiatric care     Hyperlipidemia     Hypertension     Keloid cicatrix     Rachael     Antidepressant-induced.    Obesity     Psychiatric problem     Therapy          Past Surgical History:   Procedure Laterality Date    APPENDECTOMY  1980    CERVICAL BIOPSY  W/ LOOP ELECTRODE EXCISION  1985    CHOLECYSTECTOMY  1980    [facility]  2000    COLONOSCOPY N/A 7/12/2016    Procedure: COLONOSCOPY;  Surgeon: Chito Day MD;  Location: [facility] ([address] [place] [address]);  Service: Endoscopy;  Laterality: N/A;    COLONOSCOPY N/A 8/31/2022    Procedure: COLONOSCOPY;  Surgeon: Lee More MD;  Location: [address]);  Service: Endoscopy;  Laterality: N/A;  fully vaccinated  instructions via mail  clear liquids 4 hr prior-AM Prep-LW    DILATION AND CURETTAGE OF UTERUS  1983    HERNIA REPAIR      LIPOMA RESECTION      TUBAL LIGATION  1985    UMBILICAL HERNIA REPAIR      Wedge resection liver -hemangioma         Time Tracking:     OT Date of Treatment: 05/11/23  OT Start Time: 1402  OT Stop Time: 1410  OT Total Time (min): 8 min    Billable Minutes:Evaluation 8    5/11/2023

## 2023-05-11 NOTE — ED NOTES
[facility] Unit #[id number] arrived to transport pt to  [facility] [place]

## 2023-05-11 NOTE — PLAN OF CARE
D/C recs noted. Pt to have Neuro and PCP follow up. Pharmacist will go over home medications and reasons for medications. VN and bedside nurse to reiterate final discharge instructions.       At time of discharge pt will be transported home by family.  DME at discharge:none  Home Health:none    Pt has follow up appointments added to AVS.         05/11/23 1618   Final Note   Assessment Type Final Discharge Note   Anticipated Discharge Disposition Home   What phone number can be called within the next 1-3 days to see how you are doing after discharge? [phone]   Hospital Resources/Appts/Education Provided Appointments scheduled by Navigator/Coordinator   Post-Acute Status   Discharge Delays None known at this time       Future Appointments   Date Time Provider Department Center   5/17/2023  9:00 AM Nayely Garcia MD [facility] IM [facility] PCW   6/12/2023  1:40 PM Marnie Quiroz MD [facility] [id number] [facility]   9/5/2023 10:00 AM Rule Rodriguez Jr., MD [facility] PSYCH [facility], AL[facility] Basilio Case Management  [phone]

## 2023-05-11 NOTE — PLAN OF CARE
Problem: Occupational Therapy  Goal: Occupational Therapy Goal  Outcome: Adequate for Care Transition       Pt performing at baseline for ADLs and functional mobility. BUE ROM/MMT/FMC/GMC/sensation all WFL. Pt reports no visual deficits. Pt does not require further OT services at this time. D/c OT.

## 2023-05-11 NOTE — PLAN OF CARE
Problem: Physical Therapy  Goal: Physical Therapy Goal  Outcome: Adequate for Care Transition     PT Eval completed, note to follow. Pt ambulates in the muro independently with no AD required. No balance/strength/sensation/coordination deficits noted. D/c home with no needs. D/c PT.

## 2023-05-11 NOTE — PLAN OF CARE
05/10/23 2338   Admission   Initial VN Admission Questions Complete   Communication Issues? None   Shift   Virtual Nurse - Rounding Complete   Pain Management Interventions quiet environment facilitated;relaxation techniques promoted   Virtual Nurse - Patient Verbalized Approval Of VN Rounding;Camera Use   Type of Frequent Check   Type Patient Rounds;Telemetry Monitoring   Safety/Activity   Patient Rounds bed in low position;bed wheels locked;placement of personal items at bedside;call light in patient/parent reach;visualized patient;clutter free environment maintained;ID band on   Safety Promotion/Fall Prevention assistive device/personal item within reach;bed alarm set;Fall Risk reviewed with patient/family;nonskid shoes/socks when out of bed;side rails raised x 2;medications reviewed;room near unit station   Safety Precautions emergency equipment at bedside   Activity Management Ambulated in room - L4   Positioning   Body Position position changed independently   Head of Bed (HOB) Positioning HOB elevated   Positioning/Transfer Devices pillows;in use    VN cued into room to complete admit assessment. VIP model introduced; VN working alongside bedside treatment team.  Plan of care reviewed with patient. Patient informed of fall risk, fall precautions, call light within reach, side rails x2 elevated. Patient notified to ask staff for assistance. Patient verbalized complete understanding. Time allowed for questions. Will continue to monitor and intervene as needed.

## 2023-05-11 NOTE — CONSULTS
"NEUROLOGY FLOOR CONSULT    Reason for consult:  episode of dizziness    CC:  "episode of dizziness"    HPI:   Caprice Gutierrez is a 77 y.o. w/ Hx of DMII, HTN, HLD who presented after an episode of vertigo yesterday morning that began when she sat down on the couch. The episode lasted around a minute and resolved on its own. It never recurred, and  She denies any other symptoms such as dysarthria, numbness weakness, vision changes, tinnitus, hearing loss.         ROS: As per HPI    Histories:     Allergies:  Ace inhibitors and Lisinopril    Current Medications:    Current Facility-Administered Medications   Medication Dose Route Frequency Provider Last Rate Last Admin    aspirin EC tablet 81 mg  81 mg Oral Daily Juan Manuel Archibald MD   81 mg at 05/11/23 1053    atorvastatin tablet 40 mg  40 mg Oral Daily Juan Manuel Archibald MD   40 mg at 05/11/23 1053    dextrose 10% bolus 125 mL 125 mL  12.5 g Intravenous PRN Juan Manuel Archibald MD        dextrose 10% bolus 250 mL 250 mL  25 g Intravenous PRN Juan Manuel Archibald MD        dextrose 40 % gel 15,000 mg  15 g Oral PRN Juan Manuel Archibald MD        dextrose 40 % gel 30,000 mg  30 g Oral PRN Juan Manuel Archibald MD        enoxaparin injection 40 mg  40 mg Subcutaneous Daily Juan Manuel Archibald MD        glucagon (human recombinant) injection 1 mg  1 mg Intramuscular PRN Juan Manuel Archibald MD        hydroCHLOROthiazide tablet 25 mg  25 mg Oral Daily Juan Manuel Archibald MD   25 mg at 05/11/23 1052    insulin aspart U-100 pen 0-5 Units  0-5 Units Subcutaneous QID (AC + HS) PRN Juan Manuel Archibald MD        losartan tablet 100 mg  100 mg Oral Daily Juan Manuel Archibald MD   100 mg at 05/11/23 1053    pantoprazole EC tablet 40 mg  40 mg Oral Daily Juan Manuel Archibald MD   40 mg at 05/11/23 1054    sodium chloride 0.9% bolus 500 mL 500 mL  500 mL Intravenous Continuous PRN Juan Manuel Archibald MD        sodium chloride 0.9% flush 10 mL  10 mL Intravenous PRN Juan Manuel Archibald MD           Past " Medical/Surgical/Family History:  Medical:   Past Medical History:   Diagnosis Date    Abnormal Pap smear 1985, 2000    LEEP, C    Allergy     Anemia     Anxiety     Degenerative arthritis of shoulder region     Depression     Diabetes mellitus type II     GERD (gastroesophageal reflux disease)     Headache     Hemangioma of liver     Hx of psychiatric care     Hyperlipidemia     Hypertension     Keloid cicatrix     Rachael     Antidepressant-induced.    Obesity     Psychiatric problem     Therapy       Surgeries:   Past Surgical History:   Procedure Laterality Date    APPENDECTOMY  1980    CERVICAL BIOPSY  W/ LOOP ELECTRODE EXCISION  1985    CHOLECYSTECTOMY  [facility]    [facility]  2000    COLONOSCOPY N/A 7/12/2016    Procedure: COLONOSCOPY;  Surgeon: Chito Day MD;  [facility]  Service: Endoscopy;  Laterality: N/A;    COLONOSCOPY N/A 8/31/2022    Procedure: COLONOSCOPY;  Surgeon: Lee More MD;  [facility]  Service: Endoscopy;  Laterality: N/A;  fully vaccinated  instructions via mail  clear liquids 4 hr prior-AM Prep-LW    DILATION AND CURETTAGE OF UTERUS  1983    HERNIA REPAIR      LIPOMA RESECTION      TUBAL LIGATION  1985    UMBILICAL HERNIA REPAIR      Wedge resection liver -hemangioma        Family:   Family History   Problem Relation Age of Onset    Diabetes Brother     Alcohol abuse Brother     Cirrhosis Brother     Colon cancer Sister 69    Hypertension Sister     Cancer Sister         colon    Diabetes Sister     Arthritis Sister     Cancer Mother         brain    Diabetes Brother     Cancer Brother         lung    Dementia Father     Hypertension Maternal Aunt     Stroke Maternal Aunt     Alcohol abuse Maternal Uncle     Suicide Cousin     Melanoma Neg Hx     Bipolar disorder Neg Hx     Drug abuse Neg Hx     Schizophrenia Neg Hx    ,    Social History:    Substance Abuse/Dependence History:  Tobacco: denied  EtOH: none  Ilicits: denies    Occupational/Employment  History:  Occupation: retired      Current Evaluation:     Vital Signs:   Vitals:    05/11/23 1520   BP: (!) 150/81   Pulse: 70   Resp: 20   Temp: 97.4 °F (36.3 °C)        Neurological Examination   Orientation  Alert, awake, oriented to self, place, time, and situation.  Memory  Recent and remote memory intact.  Language  No dysarthria, No aphasia.   Cranial Nerves  PERRL, VF intact, EOMI, V1-V3 intact, symmetric facial expression, hearing grossly intact, SCM & TPZ 5/5, tongue midline, symmetric palate elevation.  Motor  Normal Bulk  Normal Tone  5/5 strength in 4 extremities  Sensory  Normal to light touch throughout    DTR   +2 symmetric  Cerebellar/Gait  Normal finger to nose and heel to shin.   Normal gait and stance.     LABORATORY STUDIES:  Units 05:42 9 mo ago 1 yr ago 2 yr ago 3 yr ago 4 yr ago 5 yr ago    Hemoglobin A1C 4.0 - 5.6 % 5.8 High   6.6 High  CM  6.0 High  CM  5.8 High  CM  5.9 High  CM  5.7 High  CM  5.8 High      Component Ref Range & Units 1 d ago 9 mo ago 1 yr ago 2 yr ago 3 yr ago 4 yr ago 5 yr ago   Cholesterol 120 - 199 mg/dL 217 High   213 High  CM  195 CM  227 High  CM  194 CM  237 High  CM  203 High  CM    Comment: The National Cholesterol Education Program (NCEP) has set the   following guidelines (reference ranges) for Cholesterol:   Optimal.....................<200 mg/dL   Borderline High.............200-239 mg/dL   High........................> or = 240 mg/dL    Triglycerides 30 - 150 mg/dL 79  59 CM  74 CM  67 CM  61 CM  100 CM  71 CM    Comment: The National Cholesterol Education Program (NCEP) has set the   following guidelines (reference values) for triglycerides:   Normal......................<150 mg/dL   Borderline High.............150-199 mg/dL   High........................200-499 mg/dL    HDL 40 - 75 mg/dL 71  72 CM  70 CM  79 High  CM  67 CM  71 CM  65 CM    Comment: The National Cholesterol Education Program (NCEP) has set the   following guidelines (reference values) for  HDL Cholesterol:   Low...............<40 mg/dL   Optimal...........>60 mg/dL    LDL Cholesterol 63.0 - 159.0 mg/dL 130.2  129.2 CM  110.2 CM  134.6 CM  114.8 CM  146.0 CM  123.8 CM    Comment: The National Cholesterol Education Program (NCEP) has set the   following guidelines (reference values) for LDL Cholesterol:   Optimal.......................<130 mg/dL   Borderline High...............130-159 mg/dL   High..........................160-189 mg/dL   Very High.....................>190 mg/dL    HDL/Cholesterol Ratio 20.0 - 50.0 % 32.7  33.8  35.9  34.8  34.5  30.0  32.0    Total Cholesterol/HDL Ratio 2.0 - 5.0 3.1  3.0  2.8  2.9  2.9  3.3  3.1    Non-HDL Cholesterol mg/dL 146  141 CM  125 CM  148 CM  127 CM  166 CM  138 CM    Comment: Risk category and Non-HDL cholesterol goals:   Coronary heart disease (CHD)or equivalent (10-year risk of CHD >20%):   Non-HDL cholesterol goal     <130 mg/dL   Two or more CHD risk factors and 10-year risk of CHD <= 20%:   Non-HDL cholesterol goal     <160 mg/dL   0 to 1 CHD risk factor:   Non-HDL cholesterol goal     <190 mg/dL    Resulting Agency  [facility]     RADIOLOGY STUDIES:  I have personally reviewed the images performed.     BRAIN MRI  Impression:     No evidence of acute intracranial pathology.  Scattered T2/FLAIR signal intensity, nonspecific likely chronic microvascular ischemic change    TTE   There is no evidence of intracardiac shunting.  The left ventricle is normal in size with concentric remodeling and normal systolic function.  The estimated ejection fraction is 55%.  Normal left ventricular diastolic function.  Normal right ventricular size with normal right ventricular systolic function.  Normal central venous pressure (3 mmHg).      Assessment:  P     Caprice Gutierrez is a 77 y.o. w/ Hx of HTN, HLD, DMII who presents after an episode of vertigo with concern for a TIA.  Episode is most likely peripheral in origin given time frame and  absence of other neurologic symptoms, and may be related to BPPV.     -MRI brain without significant findings  -CTA head and neck pending  -TTE with no significant findings  -continue aspirin 81 mg daily and Lipitor 40 mg daily        Case discussed with Dr. Angulo    Will sign off at this time.     Carlos Benavidez MD  [employer] [facility], PGY-III

## 2023-05-11 NOTE — PT/OT/SLP PROGRESS
Occupational Therapy  Visit Attempt     Patient Name:  Caprice Gutierrez   MRN:  [id number]    Patient not seen today secondary to Testing/imaging (xray/CT/MRI). Will follow-up as available.    5/11/2023

## 2023-05-14 NOTE — HOSPITAL COURSE
Patient monitored closely during observation. She was placed on telemetry with no arrhythmia. Neurology consulted. She underwent complete neurological working with MRI brain, CTA head and neck, and echo.  MRI brain demonstrated negative acute process. CTA head and neck demonstrated No acute intracranial abnormality.  No high-grade arterial stenosis or major vessel occlusion. Possible shallow ulcerative plaque versus atherosclerotic wall irregularity at the left ICA origin. She is medically stable for dc from medically standpoint with outpatient follow up.

## 2023-05-14 NOTE — DISCHARGE SUMMARY
[facility] Medicine  Discharge Summary      Patient Name: Caprice Gutierrez  MRN: [id number]  JAMES: [id number]  Patient Class: OP- Observation  Admission Date: 5/10/2023  Hospital Length of Stay: 0 days  Discharge Date and Time: 5/11/2023  6:19 PM  Attending Physician: No att. providers found   Discharging Provider: Wen Dalton NP  Primary Care Provider: Nayely Garcia MD    Primary Care Team: Networked reference to record PCT     HPI:   Caprice Gutierrez is 77-year-old  female with known essential hypertension, type 2 DM and GERD transferred from outside facility for transient episode dizziness and nausea.    Reports being in her usual state of health until yesterday morning after she noticed acute episode of dizziness shortly after breakfast.  She describes this as room spinning around her.  This was accompanied by nausea.  She tried to walk but was unable to do so and helped herself to the ground.  She is unsure of difficulty with speech.  Symptoms resolved spontaneously and she was able to carry on her daily activities.  The next morning she felt sluggish and saw her PCP who advised to seek medical attention in the ED. Patient denies prior history of TIA or CVA.  She denies focal weakness, sensory deficit or paresthesias or vision problems. No ear fullness or hearing loss.     Rest of the 10 point review of systems is negative except as mentioned above.      * No surgery found *      Hospital Course:   Patient monitored closely during observation. She was placed on telemetry with no arrhythmia. Neurology consulted. She underwent complete neurological working with MRI brain, CTA head and neck, and echo.  MRI brain demonstrated negative acute process. CTA head and neck demonstrated No acute intracranial abnormality.  No high-grade arterial stenosis or major vessel occlusion. Possible shallow ulcerative plaque versus atherosclerotic wall irregularity at the left ICA  origin. She is medically stable for dc from medically standpoint with outpatient follow up.                 Goals of Care Treatment Preferences:  Code Status: Full Code      Consults:   Consults (From admission, onward)        Status Ordering Provider     Inpatient consult to [facility]  Once        Provider:  Kyle Angulo Jr., MD    Completed RAJENDRA ASHBY          No new Assessment & Plan notes have been filed under this hospital service since the last note was generated.  Service: Hospital Medicine    Final Active Diagnoses:    Diagnosis Date Noted POA    PRINCIPAL PROBLEM:  TIA (transient ischemic attack) [G45.9] 05/10/2023 Yes    Diabetes mellitus type 2 in obese [E11.69, E66.9] 12/13/2022 Yes    GERD (gastroesophageal reflux disease) [K21.9] 08/08/2012 Yes    Hyperlipidemia [E78.5] 08/08/2012 Yes      Problems Resolved During this Admission:       Discharged Condition: stable    Disposition: Home or Self Care    Follow Up:   Follow-up Information     Nayely Garcia MD Follow up in 1 week(s).    Specialty: Internal Medicine  Contact information:  [address]  [place] [id number] [phone]                       Patient Instructions:      Ambulatory referral/consult to Vascular Neurology   Standing Status: Future   Referral Priority: Routine Referral Type: Consultation   Referral Reason: Specialty Services Required   Requested Specialty: Vascular Neurology   Number of Visits Requested: 1     Diet Cardiac   Order Comments: See Stroke Patient Education Guide Booklet for details.     Call 911 for any of the following:   Order Comments: Call 911  right away if any of the following warning signs come on suddenly, even if the symptoms only last for a few minutes. With stroke, timing is very important.   - Warning Signs of Stroke:  - Weakness: You may feel a sudden weakness, tingling or loss of feeling on one side of your face or body.  - Vision Problems: You may have sudden double vision or  "trouble seeing in one or both eyes.  - Speech Problems: You may have sudden trouble talking, slured speech, or problems understanding others.  - Headache: You may have sudden, severe headache.  - Movement Problems: You may experience dizziness, a feeling of spinning, a loss of balance, a feeling of falling or blackouts.     Activity as tolerated       Significant Diagnostic Studies: Cardiac Graphics: Echocardiogram:   Transthoracic echo (TTE) complete (Cupid Only):   Results for orders placed or performed during the hospital encounter of 05/10/23   Echo Saline Bubble? Yes   Result Value Ref Range    BSA 2.05 m2    TDI SEPTAL 0.03 m/s    LV LATERAL E/E' RATIO 7.57 m/s    LV SEPTAL E/E' RATIO 17.67 m/s    LA WIDTH 4.00 cm    IVC diameter 1.30 cm    Left Ventricular Outflow Tract Mean Velocity 0.59 cm/s    Left Ventricular Outflow Tract Mean Gradient 1.64 mmHg    Pulmonary Valve Mean Velocity 0.83 m/s    TDI LATERAL 0.07 m/s    PV PEAK VELOCITY 1.02 cm/s    LVIDd 4.60 3.5 - 6.0 cm    IVS 0.89 0.6 - 1.1 cm    Posterior Wall 0.98 0.6 - 1.1 cm    LVIDs 2.20 2.1 - 4.0 cm    FS 52 28 - 44 %    LA volume 57.83 cm3    LV mass 144.96 g    LA size 3.42 cm    RVDD 3.06 cm    RV S' 0.02 cm/s    Left Ventricle Relative Wall Thickness 0.43 cm    AV mean gradient 6 mmHg    AV valve area 2.08 cm2    AV Velocity Ratio 0.61     AV index (prosthetic) 0.58     MV mean gradient 1 mmHg    MV valve area p 1/2 method 3.70 cm2    MV valve area by continuity eq 3.97 cm2    E/A ratio 0.50     Mean e' 0.05 m/s    E wave deceleration time 205.15 msec    MV "A" wave duration 137.687214032937353 msec    Pulm vein S/D ratio 1.18     LVOT diameter 2.13 cm    LVOT area 3.6 cm2    LVOT peak jayson 0.96 m/s    LVOT peak VTI 19.60 cm    Ao peak jayson 1.58 m/s    Ao VTI 33.6 cm    LVOT stroke volume 69.80 cm3    AV peak gradient 10 mmHg    MV peak gradient 5 mmHg    E/E' ratio 10.60 m/s    MV Peak E Jayson 0.53 m/s    MV VTI 17.6 cm    MV stenosis pressure 1/2 " time 59.49 ms    MV Peak A Jayson 1.07 m/s    PV Peak S Jayson 0.60 m/s    PV Peak D Jayson 0.51 m/s    LV Systolic Volume 55.34 mL    LV Systolic Volume Index 27.8 mL/m2    LV Diastolic Volume 130.72 mL    LV Diastolic Volume Index 65.69 mL/m2    LA Volume Index 29.1 mL/m2    LV Mass Index 73 g/m2    RA Major Axis 4.43 cm    Left Atrium Minor Axis 4.89 cm    Left Atrium Major Axis 5.06 cm    LA Volume Index (Mod) 24.1 mL/m2    LA volume (mod) 48.01 cm3    RA Width 3.30 cm    Quach's Biplane MOD Ejection Fraction 5 %    Right Atrial Pressure (from IVC) 3 mmHg    EF 55 %    Ao root annulus 2.80 cm    Narrative    · There is no evidence of intracardiac shunting.  · The left ventricle is normal in size with concentric remodeling and   normal systolic function.  · The estimated ejection fraction is 55%.  · Normal left ventricular diastolic function.  · Normal right ventricular size with normal right ventricular systolic   function.  · Normal central venous pressure (3 mmHg).          Pending Diagnostic Studies:     None         Medications:  Reconciled Home Medications:      Medication List      START taking these medications    atorvastatin 10 MG tablet  Commonly known as: LIPITOR  Take 4 tablets (40 mg total) by mouth once daily.        CHANGE how you take these medications    ergocalciferol 50,000 unit Cap  Commonly known as: VITAMIN D2  TAKE ONE CAPSULE BY MOUTH EVERY 7 DAYS  What changed:   · how much to take  · how to take this  · when to take this  · additional instructions     irbesartan 300 MG tablet  Commonly known as: AVAPRO  TAKE ONE TABLET BY MOUTH DAILY IN THE EVENING  What changed: when to take this        CONTINUE taking these medications    aspirin 81 MG EC tablet  Commonly known as: ECOTRIN  Take 81 mg by mouth once daily.     blood sugar diagnostic Strp  Commonly known as: CONTOUR TEST STRIPS  TEST ONE TO 3 TIMES A DAY AS DIRECTED     desvenlafaxine succinate 50 MG Tb24  Commonly known as: PRISTIQ  Take 1  tablet (50 mg total) by mouth once daily.     esomeprazole 20 MG capsule  Commonly known as: NEXIUM  Take 20 mg by mouth before breakfast.     hydroCHLOROthiazide 25 MG tablet  Commonly known as: HYDRODIURIL  TAKE ONE TABLET BY MOUTH EVERY DAY     MICROLET LANCET Misc  Generic drug: lancets  USE ONE TO 3 TIMES DAILY     mirtazapine 30 MG tablet  Commonly known as: REMERON  Take 30 mg by mouth nightly as needed.     NIVA-FOL 2.5-25-2 mg Tab  Generic drug: folic acid-vit B6-vit B12 2.5-25-2 mg  Take 1 tablet by mouth once daily.            Indwelling Lines/Drains at time of discharge:   Lines/Drains/Airways     None                 Time spent on the discharge of patient: 45 minutes         Wen Dalton NP  Department of Hospital Medicine  [facility]

## 2023-05-17 ENCOUNTER — OFFICE VISIT (OUTPATIENT)
Dept: INTERNAL MEDICINE | Facility: CLINIC | Age: 78
End: 2023-05-17
Payer: MEDICARE

## 2023-05-17 VITALS
WEIGHT: 201.5 LBS | HEIGHT: 65 IN | HEART RATE: 64 BPM | BODY MASS INDEX: 33.57 KG/M2 | SYSTOLIC BLOOD PRESSURE: 152 MMHG | DIASTOLIC BLOOD PRESSURE: 82 MMHG | OXYGEN SATURATION: 100 %

## 2023-05-17 DIAGNOSIS — I10 HYPERTENSION, UNSPECIFIED TYPE: ICD-10-CM

## 2023-05-17 DIAGNOSIS — I70.0 AORTIC ATHEROSCLEROSIS: ICD-10-CM

## 2023-05-17 DIAGNOSIS — R42 VERTIGO: Primary | ICD-10-CM

## 2023-05-17 DIAGNOSIS — F43.9 SITUATIONAL STRESS: ICD-10-CM

## 2023-05-17 DIAGNOSIS — E78.5 HYPERLIPIDEMIA, UNSPECIFIED HYPERLIPIDEMIA TYPE: ICD-10-CM

## 2023-05-17 DIAGNOSIS — I77.9 CAROTID ARTERY DISEASE, UNSPECIFIED LATERALITY, UNSPECIFIED TYPE: ICD-10-CM

## 2023-05-17 DIAGNOSIS — F41.9 ANXIETY DISORDER, UNSPECIFIED TYPE: ICD-10-CM

## 2023-05-17 PROBLEM — G45.9 TIA (TRANSIENT ISCHEMIC ATTACK): Status: RESOLVED | Noted: 2023-05-10 | Resolved: 2023-05-17

## 2023-05-17 PROCEDURE — 1159F PR MEDICATION LIST DOCUMENTED IN MEDICAL RECORD: ICD-10-PCS | Mod: CPTII,,, | Performed by: INTERNAL MEDICINE

## 2023-05-17 PROCEDURE — 3079F PR MOST RECENT DIASTOLIC BLOOD PRESSURE 80-89 MM HG: ICD-10-PCS | Mod: CPTII,,, | Performed by: INTERNAL MEDICINE

## 2023-05-17 PROCEDURE — 3288F PR FALLS RISK ASSESSMENT DOCUMENTED: ICD-10-PCS | Mod: CPTII,,, | Performed by: INTERNAL MEDICINE

## 2023-05-17 PROCEDURE — 1160F PR REVIEW ALL MEDS BY PRESCRIBER/CLIN PHARMACIST DOCUMENTED: ICD-10-PCS | Mod: CPTII,,, | Performed by: INTERNAL MEDICINE

## 2023-05-17 PROCEDURE — 3079F DIAST BP 80-89 MM HG: CPT | Mod: CPTII,,, | Performed by: INTERNAL MEDICINE

## 2023-05-17 PROCEDURE — 99214 PR OFFICE/OUTPT VISIT, EST, LEVL IV, 30-39 MIN: ICD-10-PCS | Mod: ,,, | Performed by: INTERNAL MEDICINE

## 2023-05-17 PROCEDURE — 3288F FALL RISK ASSESSMENT DOCD: CPT | Mod: CPTII,,, | Performed by: INTERNAL MEDICINE

## 2023-05-17 PROCEDURE — 1101F PT FALLS ASSESS-DOCD LE1/YR: CPT | Mod: CPTII,,, | Performed by: INTERNAL MEDICINE

## 2023-05-17 PROCEDURE — 99999 PR PBB SHADOW E&M-EST. PATIENT-LVL III: CPT | Mod: PBBFAC,,, | Performed by: INTERNAL MEDICINE

## 2023-05-17 PROCEDURE — 1126F AMNT PAIN NOTED NONE PRSNT: CPT | Mod: CPTII,,, | Performed by: INTERNAL MEDICINE

## 2023-05-17 PROCEDURE — 3077F SYST BP >= 140 MM HG: CPT | Mod: CPTII,,, | Performed by: INTERNAL MEDICINE

## 2023-05-17 PROCEDURE — 99214 OFFICE O/P EST MOD 30 MIN: CPT | Mod: ,,, | Performed by: INTERNAL MEDICINE

## 2023-05-17 PROCEDURE — 1160F RVW MEDS BY RX/DR IN RCRD: CPT | Mod: CPTII,,, | Performed by: INTERNAL MEDICINE

## 2023-05-17 PROCEDURE — 99213 OFFICE O/P EST LOW 20 MIN: CPT | Performed by: INTERNAL MEDICINE

## 2023-05-17 PROCEDURE — 1126F PR PAIN SEVERITY QUANTIFIED, NO PAIN PRESENT: ICD-10-PCS | Mod: CPTII,,, | Performed by: INTERNAL MEDICINE

## 2023-05-17 PROCEDURE — 1159F MED LIST DOCD IN RCRD: CPT | Mod: CPTII,,, | Performed by: INTERNAL MEDICINE

## 2023-05-17 PROCEDURE — 3077F PR MOST RECENT SYSTOLIC BLOOD PRESSURE >= 140 MM HG: ICD-10-PCS | Mod: CPTII,,, | Performed by: INTERNAL MEDICINE

## 2023-05-17 PROCEDURE — 1101F PR PT FALLS ASSESS DOC 0-1 FALLS W/OUT INJ PAST YR: ICD-10-PCS | Mod: CPTII,,, | Performed by: INTERNAL MEDICINE

## 2023-05-17 PROCEDURE — 99999 PR PBB SHADOW E&M-EST. PATIENT-LVL III: ICD-10-PCS | Mod: PBBFAC,,, | Performed by: INTERNAL MEDICINE

## 2023-05-17 NOTE — PROGRESS NOTES
Subjective     Patient ID: Caprice Gutierrez is a 77 y.o. female.    Chief Complaint: Follow-up (Hosp f/u )    HPI  Admitted 5/10 -5/11 with vertigo and nausea.  CVA was ruled out.  Possible shallow ulcerative plaque L ICA.  Echo normal.  Atorvastatin prescribed, but she is not taking.  Dizziness has resolved.  Back to normal activities. She is taking baby asa.  BP ws normal in hospital, she thinks.  A1c - 5.8  hgb 11.9.    She has been stressed caring for her ill , but she is now providing better self-care.   She is feeling anxious.  She stopped Pristiq months ago.  She also stopped remeron, but is sleeping ok without it.    Home BP has been normal, but is elevated today..     Review of Systems   Constitutional:  Negative for fever and unexpected weight change.   HENT:  Negative for nasal congestion and postnasal drip.    Respiratory:  Negative for chest tightness, shortness of breath and wheezing.    Cardiovascular:  Negative for chest pain and leg swelling.   Gastrointestinal:  Negative for abdominal pain, anal bleeding, constipation, diarrhea, nausea and vomiting.   Genitourinary:  Negative for dysuria and urgency.   Integumentary:  Negative for rash.   Neurological:  Negative for headaches.   Psychiatric/Behavioral:  Negative for dysphoric mood and sleep disturbance. The patient is not nervous/anxious.         Objective     Physical Exam  Constitutional:       General: She is not in acute distress.     Appearance: She is well-developed. She is not ill-appearing, toxic-appearing or diaphoretic.   Eyes:      General: No scleral icterus.  Neck:      Thyroid: No thyromegaly.      Vascular: No JVD.   Cardiovascular:      Rate and Rhythm: Normal rate and regular rhythm.      Heart sounds: Normal heart sounds.   Pulmonary:      Effort: Pulmonary effort is normal. No respiratory distress.      Breath sounds: Normal breath sounds. No stridor. No wheezing, rhonchi or rales.   Abdominal:      General: There is  no distension.      Palpations: Abdomen is soft. There is no mass.      Tenderness: There is no abdominal tenderness. There is no guarding or rebound.      Hernia: No hernia is present.   Musculoskeletal:      Right lower leg: No edema.      Left lower leg: No edema.   Neurological:      Mental Status: She is alert and oriented to person, place, and time.      Cranial Nerves: No cranial nerve deficit.   Psychiatric:         Mood and Affect: Mood normal.         Behavior: Behavior normal.         Thought Content: Thought content normal.         Judgment: Judgment normal.     162/90     Assessment and Plan     Problem List Items Addressed This Visit       Vertigo - Primary    Hyperlipidemia    Relevant Orders    Lipid Panel    ALT (SGPT)    AST (SGOT)    Carotid artery disease    Aortic atherosclerosis     Atorvastatin started         Anxiety disorder     Other Visit Diagnoses       Situational stress        Hypertension, unspecified type                Caprice was seen today for follow-up.    Diagnoses and all orders for this visit:    Vertigo    Aortic atherosclerosis    Situational stress    Hypertension, unspecified type    Carotid artery disease, unspecified laterality, unspecified type    Hyperlipidemia, unspecified hyperlipidemia type  -     Lipid Panel; Future  -     ALT (SGPT); Future  -     AST (SGOT); Future    Anxiety disorder, unspecified type      Encouraged to start statin and restart Pristiq.     Rtc 3mo

## 2023-05-25 RX ORDER — IRBESARTAN 300 MG/1
TABLET ORAL
Qty: 90 TABLET | Refills: 3 | Status: SHIPPED | OUTPATIENT
Start: 2023-05-25

## 2023-05-25 NOTE — TELEPHONE ENCOUNTER
No care due was identified.  [facility] Embedded Care Due Messages. Reference number: [id number].   5/25/2023 9:20:57 AM CDT

## 2023-05-25 NOTE — TELEPHONE ENCOUNTER
Refill Routing Note   Medication(s) are not appropriate for processing by [facility] for the following reason(s):      Required vitals abnormal    [facility] action(s):  Defer None identified          Appointments  past 12m or future 3m with PCP    Date Provider   Last Visit   5/17/2023 Nayely Garcia MD   Next Visit   8/23/2023 Nayely Garcia MD   ED visits in past 90 days: 0        Note composed:10:59 AM 05/25/2023

## 2023-05-29 DIAGNOSIS — R11.0 NAUSEA: Primary | ICD-10-CM

## 2023-05-29 RX ORDER — ONDANSETRON HYDROCHLORIDE 8 MG/1
8 TABLET, FILM COATED ORAL EVERY 12 HOURS PRN
Qty: 20 TABLET | Refills: 0 | Status: SHIPPED | OUTPATIENT
Start: 2023-05-29 | End: 2024-02-21 | Stop reason: SDUPTHER

## 2023-05-29 NOTE — TELEPHONE ENCOUNTER
No care due was identified.  [facility] Embedded Care Due Messages. Reference number: [id number].   5/29/2023 8:55:34 AM CDT

## 2023-05-29 NOTE — TELEPHONE ENCOUNTER
----- Message from Jacqui Dorman sent at 5/29/2023  7:47 AM CDT -----  Contact: Pt [phone]  Requesting an RX refill or new RX.  Is this a refill or new RX: Refill  RX name and strength ondansetron (ZOFRAN) 8 MG tablet   Is this a 30 day or 90 day RX:   Pharmacy name and phone # [facility] - [facility] [address] [address]   Phone:  [phone] Fax:  [phone]

## 2023-08-09 ENCOUNTER — PATIENT OUTREACH (OUTPATIENT)
Dept: ADMINISTRATIVE | Facility: HOSPITAL | Age: 78
End: 2023-08-09
Payer: MEDICARE

## 2023-08-09 NOTE — PROGRESS NOTES
Health Maintenance Due   Topic Date Due    TETANUS VACCINE  Never done    Shingles Vaccine (1 of 2) Never done    Diabetes Urine Screening  01/21/2016    COVID-19 Vaccine (2 - Booster for Jaylin series) 05/12/2021    Eye Exam  04/05/2022        updated. Triggered LINKS and Care Everywhere. Chart reviewed. Scheduled labs     Karma Frederick LPN   Clinical Care Coordinator  Primary Care and Wellness

## 2023-08-17 ENCOUNTER — LAB VISIT (OUTPATIENT)
Dept: LAB | Facility: HOSPITAL | Age: 78
End: 2023-08-17
Payer: MEDICARE

## 2023-08-17 DIAGNOSIS — E11.69 DIABETES MELLITUS TYPE 2 IN OBESE: ICD-10-CM

## 2023-08-17 DIAGNOSIS — E78.5 HYPERLIPIDEMIA, UNSPECIFIED HYPERLIPIDEMIA TYPE: ICD-10-CM

## 2023-08-17 DIAGNOSIS — E66.9 DIABETES MELLITUS TYPE 2 IN OBESE: ICD-10-CM

## 2023-08-17 LAB
ALT SERPL W/O P-5'-P-CCNC: 17 U/L (ref 10–44)
AST SERPL-CCNC: 30 U/L (ref 15–46)
CHOLEST SERPL-MCNC: 221 MG/DL (ref 120–199)
CHOLEST/HDLC SERPL: 2.8 {RATIO} (ref 2–5)
ESTIMATED AVG GLUCOSE: 123 MG/DL (ref 68–131)
HBA1C MFR BLD: 5.9 % (ref 4–5.6)
HDLC SERPL-MCNC: 78 MG/DL (ref 40–75)
HDLC SERPL: 35.3 % (ref 20–50)
LDLC SERPL CALC-MCNC: 124.4 MG/DL (ref 63–159)
NONHDLC SERPL-MCNC: 143 MG/DL
TRIGL SERPL-MCNC: 93 MG/DL (ref 30–150)

## 2023-08-17 PROCEDURE — 36415 COLL VENOUS BLD VENIPUNCTURE: CPT | Mod: PO | Performed by: INTERNAL MEDICINE

## 2023-08-17 PROCEDURE — 84460 ALANINE AMINO (ALT) (SGPT): CPT | Mod: PO | Performed by: INTERNAL MEDICINE

## 2023-08-17 PROCEDURE — 83036 HEMOGLOBIN GLYCOSYLATED A1C: CPT | Performed by: INTERNAL MEDICINE

## 2023-08-17 PROCEDURE — 80061 LIPID PANEL: CPT | Performed by: INTERNAL MEDICINE

## 2023-08-17 PROCEDURE — 84450 TRANSFERASE (AST) (SGOT): CPT | Mod: PO | Performed by: INTERNAL MEDICINE

## 2023-08-23 ENCOUNTER — OFFICE VISIT (OUTPATIENT)
Dept: INTERNAL MEDICINE | Facility: CLINIC | Age: 78
End: 2023-08-23
Payer: MEDICARE

## 2023-08-23 ENCOUNTER — PATIENT OUTREACH (OUTPATIENT)
Dept: ADMINISTRATIVE | Facility: HOSPITAL | Age: 78
End: 2023-08-23
Payer: MEDICARE

## 2023-08-23 VITALS
SYSTOLIC BLOOD PRESSURE: 168 MMHG | DIASTOLIC BLOOD PRESSURE: 86 MMHG | WEIGHT: 200.19 LBS | OXYGEN SATURATION: 99 % | HEART RATE: 62 BPM | HEIGHT: 65 IN | BODY MASS INDEX: 33.35 KG/M2

## 2023-08-23 DIAGNOSIS — E66.9 DIABETES MELLITUS TYPE 2 IN OBESE: Primary | ICD-10-CM

## 2023-08-23 DIAGNOSIS — E11.69 DIABETES MELLITUS TYPE 2 IN OBESE: Primary | ICD-10-CM

## 2023-08-23 DIAGNOSIS — F43.9 SITUATIONAL STRESS: ICD-10-CM

## 2023-08-23 DIAGNOSIS — E11.59 HYPERTENSION ASSOCIATED WITH DIABETES: ICD-10-CM

## 2023-08-23 DIAGNOSIS — I15.2 HYPERTENSION ASSOCIATED WITH DIABETES: ICD-10-CM

## 2023-08-23 DIAGNOSIS — F43.23 ADJUSTMENT DISORDER WITH MIXED ANXIETY AND DEPRESSED MOOD: ICD-10-CM

## 2023-08-23 PROCEDURE — 1101F PR PT FALLS ASSESS DOC 0-1 FALLS W/OUT INJ PAST YR: ICD-10-PCS | Mod: CPTII,S$GLB,, | Performed by: INTERNAL MEDICINE

## 2023-08-23 PROCEDURE — 3077F PR MOST RECENT SYSTOLIC BLOOD PRESSURE >= 140 MM HG: ICD-10-PCS | Mod: CPTII,S$GLB,, | Performed by: INTERNAL MEDICINE

## 2023-08-23 PROCEDURE — 3077F SYST BP >= 140 MM HG: CPT | Mod: CPTII,S$GLB,, | Performed by: INTERNAL MEDICINE

## 2023-08-23 PROCEDURE — 99999 PR PBB SHADOW E&M-EST. PATIENT-LVL IV: ICD-10-PCS | Mod: PBBFAC,,, | Performed by: INTERNAL MEDICINE

## 2023-08-23 PROCEDURE — 1159F PR MEDICATION LIST DOCUMENTED IN MEDICAL RECORD: ICD-10-PCS | Mod: CPTII,S$GLB,, | Performed by: INTERNAL MEDICINE

## 2023-08-23 PROCEDURE — 99214 OFFICE O/P EST MOD 30 MIN: CPT | Mod: S$GLB,,, | Performed by: INTERNAL MEDICINE

## 2023-08-23 PROCEDURE — 1126F PR PAIN SEVERITY QUANTIFIED, NO PAIN PRESENT: ICD-10-PCS | Mod: CPTII,S$GLB,, | Performed by: INTERNAL MEDICINE

## 2023-08-23 PROCEDURE — 3079F PR MOST RECENT DIASTOLIC BLOOD PRESSURE 80-89 MM HG: ICD-10-PCS | Mod: CPTII,S$GLB,, | Performed by: INTERNAL MEDICINE

## 2023-08-23 PROCEDURE — 3288F PR FALLS RISK ASSESSMENT DOCUMENTED: ICD-10-PCS | Mod: CPTII,S$GLB,, | Performed by: INTERNAL MEDICINE

## 2023-08-23 PROCEDURE — 1101F PT FALLS ASSESS-DOCD LE1/YR: CPT | Mod: CPTII,S$GLB,, | Performed by: INTERNAL MEDICINE

## 2023-08-23 PROCEDURE — 1159F MED LIST DOCD IN RCRD: CPT | Mod: CPTII,S$GLB,, | Performed by: INTERNAL MEDICINE

## 2023-08-23 PROCEDURE — 3288F FALL RISK ASSESSMENT DOCD: CPT | Mod: CPTII,S$GLB,, | Performed by: INTERNAL MEDICINE

## 2023-08-23 PROCEDURE — 99999 PR PBB SHADOW E&M-EST. PATIENT-LVL IV: CPT | Mod: PBBFAC,,, | Performed by: INTERNAL MEDICINE

## 2023-08-23 PROCEDURE — 1126F AMNT PAIN NOTED NONE PRSNT: CPT | Mod: CPTII,S$GLB,, | Performed by: INTERNAL MEDICINE

## 2023-08-23 PROCEDURE — 3079F DIAST BP 80-89 MM HG: CPT | Mod: CPTII,S$GLB,, | Performed by: INTERNAL MEDICINE

## 2023-08-23 PROCEDURE — 99214 PR OFFICE/OUTPT VISIT, EST, LEVL IV, 30-39 MIN: ICD-10-PCS | Mod: S$GLB,,, | Performed by: INTERNAL MEDICINE

## 2023-08-23 NOTE — PROGRESS NOTES
Subjective     Patient ID: Caprice Gutierrez is a 77 y.o. female.    Chief Complaint: Follow-up    HPI  Very stressed.   is ill.  She is angry and hurt as  didn't express anuria to her.  She feels disappointed in herself for not detecting his most acute medical problem more quickly.  She is not consoled by my advice.  She stopped Pristiq in October and occas takes half a remeron q hs for sleep.  She has felt depressed since this am only.  Her anxiety is worsening.  She did NOT restart Pristiq, as recommended by me last visit, when her psychologic symptoms became more acute..    BP was as low as 118/71 before stress increased.    She did not restart atorvastatin either.      Review of Systems   Psychiatric/Behavioral:  Positive for dysphoric mood. The patient is nervous/anxious.           Objective     Physical Exam  Constitutional:       General: She is not in acute distress.     Appearance: She is well-developed. She is not ill-appearing, toxic-appearing or diaphoretic.   Eyes:      General: No scleral icterus.  Neck:      Thyroid: No thyromegaly.      Vascular: No JVD.   Cardiovascular:      Rate and Rhythm: Normal rate and regular rhythm.      Heart sounds: Normal heart sounds.   Pulmonary:      Effort: Pulmonary effort is normal. No respiratory distress.      Breath sounds: Normal breath sounds. No stridor. No wheezing, rhonchi or rales.   Abdominal:      General: There is no distension.      Palpations: Abdomen is soft. There is no mass.      Tenderness: There is no abdominal tenderness. There is no guarding or rebound.      Hernia: No hernia is present.   Musculoskeletal:      Right lower leg: No edema.      Left lower leg: No edema.   Neurological:      Mental Status: She is alert and oriented to person, place, and time.      Cranial Nerves: No cranial nerve deficit.   Psychiatric:         Behavior: Behavior normal.         Thought Content: Thought content normal.         Judgment: Judgment  normal.      Comments: Depressed and anxious affect       168/62     Results for orders placed or performed in visit on 08/17/23   Microalbumin/Creatinine Ratio, Urine   Result Value Ref Range    Microalbumin, Urine 6.0 ug/mL    Creatinine, Urine 93.0 15.0 - 325.0 mg/dL    Microalb/Creat Ratio 6.5 0.0 - 30.0 ug/mg      Lab Results   Component Value Date    LDLCALC 124.4 08/17/2023      Lab Results   Component Value Date    HGBA1C 5.9 (H) 08/17/2023      Assessment and Plan     1. Diabetes mellitus type 2 in obese    2. Hypertension associated with diabetes    3. Situational stress    4. Adjustment disorder with mixed anxiety and depressed mood        Declines vaccines.    Restart PRistiq  Encouraged to take atorvastatin - she will consider.  F/u Dr Rodriguez.  Psycho tx recommended.

## 2023-08-23 NOTE — LETTER
AUTHORIZATION FOR RELEASE OF   CONFIDENTIAL INFORMATION    Dear [facility],    We are seeing Caprice Gutierrez, date of birth 1945, in the clinic at [facility]. Nayely Garcia MD is the patient's PCP. Caprice Gutierrez has an outstanding lab/procedure at the time we reviewed her chart. In order to help keep her health information updated, she has authorized us to request the following medical record(s):        (  )  MAMMOGRAM                                      (  )  COLONOSCOPY      (  )  PAP SMEAR                                          (  )  OUTSIDE LAB RESULTS     (  )  DEXA SCAN                                          (  x)  EYE EXAM            (  )  FOOT EXAM                                          (  )  ENTIRE RECORD     (  )  OUTSIDE IMMUNIZATIONS                 (  )  _______________         Please fax records to [facility], Epstein, Nona K., MD, [phone]     If you have any questions, please contact Karma Frederick LPN at [phone].           Patient Name: Caprice Gutierrez  : 1945  Patient Phone #: [phone]

## 2023-08-23 NOTE — PROGRESS NOTES
Health Maintenance Due   Topic Date Due    TETANUS VACCINE  Never done    Shingles Vaccine (1 of 2) Never done    COVID-19 Vaccine (2 - Booster for Jaylin series) 05/12/2021    Eye Exam  04/05/2022          updated . Triggered LINKS and Care Everywhere. Requested eye exam records from [facility]    Karma Frederick LPN   Clinical Care Coordinator  Primary Care and Wellness

## 2023-08-30 ENCOUNTER — TELEPHONE (OUTPATIENT)
Dept: PSYCHIATRY | Facility: CLINIC | Age: 78
End: 2023-08-30
Payer: MEDICARE

## 2023-08-30 NOTE — TELEPHONE ENCOUNTER
Called Pt when she failed to show for appt 8/30/2023.  She was unaware of new appt, but states she feels much better than when it was made on an urgent basis.  She plans to keep scheduled appt next week.    MAYITO

## 2023-09-05 ENCOUNTER — OFFICE VISIT (OUTPATIENT)
Dept: PSYCHIATRY | Facility: CLINIC | Age: 78
End: 2023-09-05
Payer: MEDICARE

## 2023-09-05 VITALS
WEIGHT: 199.63 LBS | BODY MASS INDEX: 33.22 KG/M2 | HEART RATE: 86 BPM | SYSTOLIC BLOOD PRESSURE: 154 MMHG | DIASTOLIC BLOOD PRESSURE: 75 MMHG

## 2023-09-05 DIAGNOSIS — F43.21 ADJUSTMENT DISORDER WITH DEPRESSED MOOD: ICD-10-CM

## 2023-09-05 DIAGNOSIS — F33.42 MAJOR DEPRESSIVE DISORDER, RECURRENT EPISODE, IN FULL REMISSION: Primary | ICD-10-CM

## 2023-09-05 PROCEDURE — 1160F PR REVIEW ALL MEDS BY PRESCRIBER/CLIN PHARMACIST DOCUMENTED: ICD-10-PCS | Mod: CPTII,S$GLB,, | Performed by: PSYCHIATRY & NEUROLOGY

## 2023-09-05 PROCEDURE — 90833 PR PSYCHOTHERAPY W/PATIENT W/E&M, 30 MIN (ADD ON): ICD-10-PCS | Mod: S$GLB,,, | Performed by: PSYCHIATRY & NEUROLOGY

## 2023-09-05 PROCEDURE — 1160F RVW MEDS BY RX/DR IN RCRD: CPT | Mod: CPTII,S$GLB,, | Performed by: PSYCHIATRY & NEUROLOGY

## 2023-09-05 PROCEDURE — 3078F PR MOST RECENT DIASTOLIC BLOOD PRESSURE < 80 MM HG: ICD-10-PCS | Mod: CPTII,S$GLB,, | Performed by: PSYCHIATRY & NEUROLOGY

## 2023-09-05 PROCEDURE — 90833 PSYTX W PT W E/M 30 MIN: CPT | Mod: S$GLB,,, | Performed by: PSYCHIATRY & NEUROLOGY

## 2023-09-05 PROCEDURE — 3077F SYST BP >= 140 MM HG: CPT | Mod: CPTII,S$GLB,, | Performed by: PSYCHIATRY & NEUROLOGY

## 2023-09-05 PROCEDURE — 99999 PR PBB SHADOW E&M-EST. PATIENT-LVL II: CPT | Mod: PBBFAC,,, | Performed by: PSYCHIATRY & NEUROLOGY

## 2023-09-05 PROCEDURE — 99213 PR OFFICE/OUTPT VISIT, EST, LEVL III, 20-29 MIN: ICD-10-PCS | Mod: S$GLB,,, | Performed by: PSYCHIATRY & NEUROLOGY

## 2023-09-05 PROCEDURE — 99213 OFFICE O/P EST LOW 20 MIN: CPT | Mod: S$GLB,,, | Performed by: PSYCHIATRY & NEUROLOGY

## 2023-09-05 PROCEDURE — 1159F PR MEDICATION LIST DOCUMENTED IN MEDICAL RECORD: ICD-10-PCS | Mod: CPTII,S$GLB,, | Performed by: PSYCHIATRY & NEUROLOGY

## 2023-09-05 PROCEDURE — 99999 PR PBB SHADOW E&M-EST. PATIENT-LVL II: ICD-10-PCS | Mod: PBBFAC,,, | Performed by: PSYCHIATRY & NEUROLOGY

## 2023-09-05 PROCEDURE — 3077F PR MOST RECENT SYSTOLIC BLOOD PRESSURE >= 140 MM HG: ICD-10-PCS | Mod: CPTII,S$GLB,, | Performed by: PSYCHIATRY & NEUROLOGY

## 2023-09-05 PROCEDURE — 1159F MED LIST DOCD IN RCRD: CPT | Mod: CPTII,S$GLB,, | Performed by: PSYCHIATRY & NEUROLOGY

## 2023-09-05 PROCEDURE — 3078F DIAST BP <80 MM HG: CPT | Mod: CPTII,S$GLB,, | Performed by: PSYCHIATRY & NEUROLOGY

## 2023-09-05 RX ORDER — MIRTAZAPINE 30 MG/1
30 TABLET, FILM COATED ORAL NIGHTLY PRN
Qty: 90 TABLET | Refills: 2 | Status: SHIPPED | OUTPATIENT
Start: 2023-09-05

## 2023-09-05 RX ORDER — DESVENLAFAXINE SUCCINATE 50 MG/1
50 TABLET, EXTENDED RELEASE ORAL DAILY
Qty: 90 TABLET | Refills: 2 | Status: SHIPPED | OUTPATIENT
Start: 2023-09-05

## 2023-09-05 RX ORDER — CYANOCOBALAMIN/FOLIC AC/VIT B6 2-2.5-25MG
1 TABLET ORAL DAILY
Qty: 90 TABLET | Refills: 3 | Status: SHIPPED | OUTPATIENT
Start: 2023-09-05

## 2023-09-09 NOTE — PROGRESS NOTES
Outpatient Psychiatry Follow-Up Visit (MD/NP)    9/5/2023    Clinical Status of Patient:  Outpatient (Ambulatory)    Chief Complaint:  Caprice Gutierrez is a 77 y.o. female who presents today for follow-up of depression.  Met with patient.      Interval History and Content of Current Session:  Interim Events/Subjective Report/Content of Current Session:  Pt returned casually dressed and neatly groomed, with smiling affect.  She called in August to request an early return during a brief downturn in mood, but the problem resolved before the appt date, and she reported that she is again in remission .  She talked about her daily life in optimistic language.  My MCFP and plans for follow-up care were discussed.      Medications and most recent lab results were reviewed.  The brief mood swing described above was discussed as evidence, along with her past history, that further medication reductions are not a good idea at this time.  She agreed.  Psychotropics were continued unchanged.  Pt will call to schedule f/u with a new provider in this clinic in 3 months.  She has been asked to talk with her PCP about HTN.    Psychotherapy:  Target symptoms: depression  Why chosen therapy is appropriate versus another modality: relevant to diagnosis, patient responds to this modality, evidence based practice  Outcome monitoring methods: self-report, observation  Therapeutic intervention type: behavior modifying psychotherapy, supportive psychotherapy  Topics discussed/themes: building skills sets for symptom management, symptom recognition  The patient's response to the intervention is motivated. The patient's progress toward treatment goals is good.   Duration of intervention: 28 minutes.    Review of Systems   PSYCHIATRIC: Pertinant items are noted in the narrative.  CARDIOVASCULAR: HTN    Past Medical, Family and Social History: The patient's past medical, family and social history have been reviewed and updated as  appropriate within the electronic medical record - see encounter notes.    Compliance: yes    Side effects: weight gain    Risk Parameters:  Patient reports no suicidal ideation  Patient reports no homicidal ideation  Patient reports no self-injurious behavior  Patient reports no violent behavior    Exam (detailed: at least 9 elements; comprehensive: all 15 elements)   Constitutional  Vitals:  Most recent vital signs, dated less than 90 days prior to this appointment, were reviewed.   Vitals:    09/05/23 0923   BP: (!) 154/75   Pulse: 86   Weight: 90.5 kg (199 lb 10 oz)        General:  age appropriate, well nourished, neatly groomed     Musculoskeletal  Muscle Strength/Tone:  no dyskinesia, no dystonia, no tremor   Gait & Station:  non-ataxic     Psychiatric  Speech:  no latency; no press, spontaneous   Mood & Affect:  euthymic  congruent and appropriate   Thought Process:  goal-directed, logical   Associations:  intact   Thought Content:  normal, no suicidality, no homicidality, delusions, or paranoia   Insight:  has awareness of illness   Judgement: behavior is adequate to circumstances   Orientation:  grossly intact   Memory: intact for content of interview   Language: grossly intact   Attention Span & Concentration:  able to focus   Fund of Knowledge:  intact and appropriate to age and level of education     Assessment and Diagnosis   Status/Progress: Based on the examination today, the patient's problem(s) is/are well controlled.  New problems have been presented today.   Co-morbidities are not complicating management of the primary condition.  There are no active rule-out diagnoses for this patient at this time.     General Impression:  Pt has a brief downward mood swing 1 month ago which resolved on its own.  She is otherwise euthymic and stable.      ICD-10-CM ICD-9-CM   1. Major depressive disorder, recurrent episode, in full remission  F33.42 296.36   2. Adjustment disorder with depressed mood  F43.21  309.0       Intervention/Counseling/Treatment Plan   Medication Management: Continue current medications.   BP control was discussed again.      Return to Clinic: 3 months

## 2023-11-20 ENCOUNTER — OFFICE VISIT (OUTPATIENT)
Dept: INTERNAL MEDICINE | Facility: CLINIC | Age: 78
End: 2023-11-20
Payer: MEDICARE

## 2023-11-20 VITALS
HEIGHT: 65 IN | BODY MASS INDEX: 34.31 KG/M2 | SYSTOLIC BLOOD PRESSURE: 180 MMHG | HEART RATE: 73 BPM | WEIGHT: 205.94 LBS | OXYGEN SATURATION: 98 % | DIASTOLIC BLOOD PRESSURE: 88 MMHG

## 2023-11-20 DIAGNOSIS — R09.89 LABILE BLOOD PRESSURE: ICD-10-CM

## 2023-11-20 DIAGNOSIS — D64.9 ANEMIA, UNSPECIFIED TYPE: ICD-10-CM

## 2023-11-20 DIAGNOSIS — F32.5 MAJOR DEPRESSIVE DISORDER IN FULL REMISSION, UNSPECIFIED WHETHER RECURRENT: ICD-10-CM

## 2023-11-20 DIAGNOSIS — I10 HYPERTENSION, UNSPECIFIED TYPE: Primary | ICD-10-CM

## 2023-11-20 PROCEDURE — 1159F PR MEDICATION LIST DOCUMENTED IN MEDICAL RECORD: ICD-10-PCS | Mod: CPTII,S$GLB,, | Performed by: INTERNAL MEDICINE

## 2023-11-20 PROCEDURE — 1101F PT FALLS ASSESS-DOCD LE1/YR: CPT | Mod: CPTII,S$GLB,, | Performed by: INTERNAL MEDICINE

## 2023-11-20 PROCEDURE — 99999 PR PBB SHADOW E&M-EST. PATIENT-LVL IV: CPT | Mod: PBBFAC,,, | Performed by: INTERNAL MEDICINE

## 2023-11-20 PROCEDURE — 1160F PR REVIEW ALL MEDS BY PRESCRIBER/CLIN PHARMACIST DOCUMENTED: ICD-10-PCS | Mod: CPTII,S$GLB,, | Performed by: INTERNAL MEDICINE

## 2023-11-20 PROCEDURE — 99214 OFFICE O/P EST MOD 30 MIN: CPT | Mod: S$GLB,,, | Performed by: INTERNAL MEDICINE

## 2023-11-20 PROCEDURE — 3079F PR MOST RECENT DIASTOLIC BLOOD PRESSURE 80-89 MM HG: ICD-10-PCS | Mod: CPTII,S$GLB,, | Performed by: INTERNAL MEDICINE

## 2023-11-20 PROCEDURE — 3288F FALL RISK ASSESSMENT DOCD: CPT | Mod: CPTII,S$GLB,, | Performed by: INTERNAL MEDICINE

## 2023-11-20 PROCEDURE — 99999 PR PBB SHADOW E&M-EST. PATIENT-LVL IV: ICD-10-PCS | Mod: PBBFAC,,, | Performed by: INTERNAL MEDICINE

## 2023-11-20 PROCEDURE — 1160F RVW MEDS BY RX/DR IN RCRD: CPT | Mod: CPTII,S$GLB,, | Performed by: INTERNAL MEDICINE

## 2023-11-20 PROCEDURE — 1159F MED LIST DOCD IN RCRD: CPT | Mod: CPTII,S$GLB,, | Performed by: INTERNAL MEDICINE

## 2023-11-20 PROCEDURE — 3288F PR FALLS RISK ASSESSMENT DOCUMENTED: ICD-10-PCS | Mod: CPTII,S$GLB,, | Performed by: INTERNAL MEDICINE

## 2023-11-20 PROCEDURE — 3077F PR MOST RECENT SYSTOLIC BLOOD PRESSURE >= 140 MM HG: ICD-10-PCS | Mod: CPTII,S$GLB,, | Performed by: INTERNAL MEDICINE

## 2023-11-20 PROCEDURE — 99214 PR OFFICE/OUTPT VISIT, EST, LEVL IV, 30-39 MIN: ICD-10-PCS | Mod: S$GLB,,, | Performed by: INTERNAL MEDICINE

## 2023-11-20 PROCEDURE — 1126F PR PAIN SEVERITY QUANTIFIED, NO PAIN PRESENT: ICD-10-PCS | Mod: CPTII,S$GLB,, | Performed by: INTERNAL MEDICINE

## 2023-11-20 PROCEDURE — 3077F SYST BP >= 140 MM HG: CPT | Mod: CPTII,S$GLB,, | Performed by: INTERNAL MEDICINE

## 2023-11-20 PROCEDURE — 1101F PR PT FALLS ASSESS DOC 0-1 FALLS W/OUT INJ PAST YR: ICD-10-PCS | Mod: CPTII,S$GLB,, | Performed by: INTERNAL MEDICINE

## 2023-11-20 PROCEDURE — 3079F DIAST BP 80-89 MM HG: CPT | Mod: CPTII,S$GLB,, | Performed by: INTERNAL MEDICINE

## 2023-11-20 PROCEDURE — 1126F AMNT PAIN NOTED NONE PRSNT: CPT | Mod: CPTII,S$GLB,, | Performed by: INTERNAL MEDICINE

## 2023-11-20 RX ORDER — CHLORTHALIDONE 25 MG/1
25 TABLET ORAL DAILY
Qty: 90 TABLET | Refills: 0 | Status: SHIPPED | OUTPATIENT
Start: 2023-11-20 | End: 2023-12-04

## 2023-11-20 NOTE — PROGRESS NOTES
Subjective     Patient ID: Caprice Gutierrez is a 78 y.o. female.    Chief Complaint: Follow-up    HPI  BP at  129/76.    Home readings 142/80, 143/73, 130/78m 129/74, 124/70  Her machine 173/83, ours 180/81    She restarted Pristiq and feels much better.    Less irritable.  Anxiety improved.     Prediabetes,  managed with diet.  Recent neg eye exam.  BMI 34.  Review of Systems   Constitutional:  Negative for fever and unexpected weight change.   HENT:  Negative for nasal congestion and postnasal drip.    Eyes:  Negative for pain, discharge and visual disturbance.   Respiratory:  Negative for cough, chest tightness, shortness of breath and wheezing.    Cardiovascular:  Negative for chest pain and leg swelling.   Gastrointestinal:  Negative for abdominal pain, constipation, diarrhea and nausea.   Genitourinary:  Negative for difficulty urinating, dysuria and hematuria.   Integumentary:  Negative for rash.   Neurological:  Negative for headaches.   Psychiatric/Behavioral:  Negative for dysphoric mood and sleep disturbance. The patient is not nervous/anxious.           Objective     Physical Exam  Constitutional:       Appearance: Normal appearance. She is not ill-appearing or diaphoretic.   Neurological:      Mental Status: She is alert.   Psychiatric:         Mood and Affect: Mood normal.         Behavior: Behavior normal.         Thought Content: Thought content normal.          178/86  Assessment and Plan     1. Hypertension, unspecified type  -     Basic Metabolic Panel; Future; Expected date: 11/20/2023    2. Anemia, unspecified type  -     CBC Auto Differential; Future; Expected date: 02/20/2024    3. Labile blood pressure    4. Major depressive disorder in full remission, unspecified whether recurrent    Other orders  -     chlorthalidone (HYGROTEN) 25 MG Tab; Take 1 tablet (25 mg total) by mouth once daily.  Dispense: 90 tablet; Refill: 0        Stop hydrochlorothiazide and start Chlorthalidone - 1 tab  daily.    Labs 2 weeks    Call me with BP readings in 3-4 weeks.    Cont pristiq       Declines immunizations  Follow up in about 8 months (around 7/20/2024) for PE.

## 2023-11-20 NOTE — PATIENT INSTRUCTIONS
Stop hydrochlorothiazide and start Chlorthalidone - 1 tab daily.    Labs 2 weeks    Call me with BP readings in 3-4 weeks.

## 2023-11-28 ENCOUNTER — TELEPHONE (OUTPATIENT)
Dept: INTERNAL MEDICINE | Facility: CLINIC | Age: 78
End: 2023-11-28
Payer: MEDICARE

## 2023-11-28 NOTE — TELEPHONE ENCOUNTER
----- Message from Alex Alarcon sent at 11/28/2023 11:55 AM CST -----  Contact: Self [phone]  a phone call.  Who left a message:  Self  Do they know what this is regarding:  Blood pressure reading Sun 164/87 Mon 179/82 Tues 154/87. Pt is very concerned. Please call back to advise.   Would they like a phone call back or a response via [facility]:  call back

## 2023-11-29 ENCOUNTER — TELEPHONE (OUTPATIENT)
Dept: INTERNAL MEDICINE | Facility: CLINIC | Age: 78
End: 2023-11-29
Payer: MEDICARE

## 2023-11-29 NOTE — TELEPHONE ENCOUNTER
----- Message from Ayah Andres sent at 11/28/2023  1:41 PM CST -----  Contact: Self/ [phone]  Patient is returning a phone call.  Who left a message for the patient: Ayana   Does patient know what this is regarding:  yes  Would you like a call back, or a response through your [facility] portal?:   call back   Comments:

## 2023-11-29 NOTE — TELEPHONE ENCOUNTER
Returned pt call. Pt states PCP recently changed her from HCTZ to chlorthalidone. States her pressure has been 164/87, 179/82, 154/87 and today was 142/87. Pt denies CP, dyspnea, headache and weakness. Encouraged pt to continue monitoring and logging her pressure and send us an update in a few days. Pt educated to report to ED should her pressure increase and she develop CP, dyspnea, HA or weakness. Pt verbalized understanding. Pt states he pressure is starting to go down and she feels herself.

## 2023-12-04 ENCOUNTER — TELEPHONE (OUTPATIENT)
Dept: INTERNAL MEDICINE | Facility: CLINIC | Age: 78
End: 2023-12-04
Payer: MEDICARE

## 2023-12-04 RX ORDER — TORSEMIDE 10 MG/1
10 TABLET ORAL DAILY
Qty: 90 TABLET | Refills: 0 | Status: SHIPPED | OUTPATIENT
Start: 2023-12-04 | End: 2024-12-03

## 2023-12-04 NOTE — TELEPHONE ENCOUNTER
Ok - stop chlorthalidone and start Demadex- 10 mg daily  Call with readings in 3 weeks.  Extend labs for another 2 weeks out.

## 2023-12-04 NOTE — TELEPHONE ENCOUNTER
Returned call to Caprice with PT. States PCP started her on chlorthalidone for her BP. Pt states since she started she has felt jittery, experienced leg cramps and her morning glucose level has been very high 200, 182. Pt states she thinks she needs a different med.

## 2023-12-04 NOTE — TELEPHONE ENCOUNTER
----- Message from Sasha Solorzano sent at 12/4/2023  8:59 AM CST -----  Contact: JEF Vasques@[phone]  Patient is calling for Medical Advice regarding:--Legs cramps, and sugar high--    How long has patient had these symptoms:--Last week when started med's--    Would like response via Personal Medicinet: --Call back    Comments:PT calling to speak with the nurse regarding the information listed above. She said the medication that the doctor gave her are giving her the side effects. Please call to advise.

## 2023-12-18 ENCOUNTER — LAB VISIT (OUTPATIENT)
Dept: LAB | Facility: HOSPITAL | Age: 78
End: 2023-12-18
Attending: INTERNAL MEDICINE
Payer: MEDICARE

## 2023-12-18 DIAGNOSIS — I10 HYPERTENSION, UNSPECIFIED TYPE: ICD-10-CM

## 2023-12-18 DIAGNOSIS — D64.9 ANEMIA, UNSPECIFIED TYPE: ICD-10-CM

## 2023-12-18 LAB
ANION GAP SERPL CALC-SCNC: 7 MMOL/L (ref 8–16)
BASOPHILS # BLD AUTO: 0.04 K/UL (ref 0–0.2)
BASOPHILS NFR BLD: 0.6 % (ref 0–1.9)
CALCIUM SERPL-MCNC: 9 MG/DL (ref 8.7–10.5)
CHLORIDE SERPL-SCNC: 102 MMOL/L (ref 95–110)
CO2 SERPL-SCNC: 30 MMOL/L (ref 23–29)
CREAT SERPL-MCNC: 0.89 MG/DL (ref 0.5–1.4)
DIFFERENTIAL METHOD: ABNORMAL
EOSINOPHIL # BLD AUTO: 0.1 K/UL (ref 0–0.5)
EOSINOPHIL NFR BLD: 2.2 % (ref 0–8)
ERYTHROCYTE [DISTWIDTH] IN BLOOD BY AUTOMATED COUNT: 16.1 % (ref 11.5–14.5)
EST. GFR  (NO RACE VARIABLE): >60 ML/MIN/1.73 M^2
GLUCOSE SERPL-MCNC: 93 MG/DL (ref 70–110)
HCT VFR BLD AUTO: 34.8 % (ref 37–48.5)
HGB BLD-MCNC: 11.7 G/DL (ref 12–16)
IMM GRANULOCYTES # BLD AUTO: 0 K/UL (ref 0–0.04)
IMM GRANULOCYTES NFR BLD AUTO: 0 % (ref 0–0.5)
LYMPHOCYTES # BLD AUTO: 2.5 K/UL (ref 1–4.8)
LYMPHOCYTES NFR BLD: 37.8 % (ref 18–48)
MCH RBC QN AUTO: 28.9 PG (ref 27–31)
MCHC RBC AUTO-ENTMCNC: 33.6 G/DL (ref 32–36)
MCV RBC AUTO: 86 FL (ref 82–98)
MONOCYTES # BLD AUTO: 0.6 K/UL (ref 0.3–1)
MONOCYTES NFR BLD: 9.9 % (ref 4–15)
NEUTROPHILS # BLD AUTO: 3.2 K/UL (ref 1.8–7.7)
NEUTROPHILS NFR BLD: 49.5 % (ref 38–73)
NRBC BLD-RTO: 0 /100 WBC
PLATELET # BLD AUTO: 315 K/UL (ref 150–450)
PMV BLD AUTO: 10.1 FL (ref 9.2–12.9)
POTASSIUM SERPL-SCNC: 3.9 MMOL/L (ref 3.5–5.1)
RBC # BLD AUTO: 4.05 M/UL (ref 4–5.4)
SODIUM SERPL-SCNC: 139 MMOL/L (ref 136–145)
UUN UR-MCNC: 16 MG/DL (ref 7–17)
WBC # BLD AUTO: 6.49 K/UL (ref 3.9–12.7)

## 2023-12-18 PROCEDURE — 36415 COLL VENOUS BLD VENIPUNCTURE: CPT | Mod: PN | Performed by: INTERNAL MEDICINE

## 2023-12-18 PROCEDURE — 85025 COMPLETE CBC W/AUTO DIFF WBC: CPT | Mod: PN | Performed by: INTERNAL MEDICINE

## 2023-12-18 PROCEDURE — 80048 BASIC METABOLIC PNL TOTAL CA: CPT | Mod: PN | Performed by: INTERNAL MEDICINE

## 2023-12-27 ENCOUNTER — TELEPHONE (OUTPATIENT)
Dept: INTERNAL MEDICINE | Facility: CLINIC | Age: 78
End: 2023-12-27
Payer: MEDICARE

## 2023-12-27 NOTE — TELEPHONE ENCOUNTER
----- Message from Alvina Mora sent at 12/22/2023  1:23 PM CST -----  Contact: PT@ [phone]  1MEDICALADVICE     Patient is calling for Medical Advice regarding:    New BP reading    Would like response via [email or web address]:  call back     Comments:  Pt would like to speak to the about her new BP readings with her new mediation tjhat the provider put her own. Please call back to advise.

## 2023-12-27 NOTE — TELEPHONE ENCOUNTER
Returned pt call. States that she spoke with someone yesterday and has no further needs at this time.

## 2024-01-02 ENCOUNTER — PATIENT OUTREACH (OUTPATIENT)
Dept: ADMINISTRATIVE | Facility: HOSPITAL | Age: 79
End: 2024-01-02
Payer: MEDICARE

## 2024-01-02 ENCOUNTER — TELEPHONE (OUTPATIENT)
Dept: ADMINISTRATIVE | Facility: HOSPITAL | Age: 79
End: 2024-01-02
Payer: MEDICARE

## 2024-01-02 VITALS — SYSTOLIC BLOOD PRESSURE: 127 MMHG | DIASTOLIC BLOOD PRESSURE: 78 MMHG

## 2024-01-02 NOTE — TELEPHONE ENCOUNTER
/78 self reported. Updated chart.    Karma Frederick LPN   Clinical Care Coordinator  Primary Care and Wellness

## 2024-01-02 NOTE — PROGRESS NOTES

## 2024-02-14 ENCOUNTER — TELEPHONE (OUTPATIENT)
Dept: INTERNAL MEDICINE | Facility: CLINIC | Age: 79
End: 2024-02-14

## 2024-02-14 DIAGNOSIS — Z12.31 ENCOUNTER FOR SCREENING MAMMOGRAM FOR BREAST CANCER: Primary | ICD-10-CM

## 2024-02-14 NOTE — TELEPHONE ENCOUNTER
----- Message from Ivone Darby sent at 2/14/2024  9:33 AM CST -----  Contact: pt @ [phone]  1MEDICALADVICE     Patient is calling for Medical Advice regarding: pt needs orders to be sent over to the imaging center in [facility] for mammogram     How long has patient had these symptoms:    Pharmacy name and phone#:    Would like response via Quote Rollerhart:  call     Comments:

## 2024-02-21 DIAGNOSIS — R11.0 NAUSEA: ICD-10-CM

## 2024-02-21 NOTE — TELEPHONE ENCOUNTER
Care Due:                  Date            Visit Type   Department     Provider  --------------------------------------------------------------------------------                                EP -                              PRIMARY      [facility]  Last Visit: 11-      CARE ([facility])   MEDICINE       CANDELARIA LEGGETT  Next Visit: None Scheduled  None         None Found                                                            Last  Test          Frequency    Reason                     Performed    Due Date  --------------------------------------------------------------------------------    Vitamin D...  12 months..  ergocalciferol...........  Not Found    Overdue    [employer] Embedded Care Due Messages. Reference number: [id number].   2/21/2024 3:14:30 PM CST

## 2024-02-21 NOTE — TELEPHONE ENCOUNTER
----- Message from Marco Qureshi sent at 2/21/2024 12:26 PM CST -----  Contact: [phone]@patient  Requesting an RX refill or new RX.ondansetron (ZOFRAN) 8 MG tablet  Is this a refill or new RX: refill  RX name and strength (copy/paste from chart):    Is this a 30 day or 90 day RX:   Pharmacy name and phone #   [facility] - [place], [place] - [address] [address]  The doctors have asked that we provide their patients with the following 2 reminders -- prescription refills can take up to 72 hours, and a friendly reminder that in the future you can use your [facility] account to request refills:

## 2024-02-22 RX ORDER — ONDANSETRON HYDROCHLORIDE 8 MG/1
8 TABLET, FILM COATED ORAL EVERY 12 HOURS PRN
Qty: 20 TABLET | Refills: 0 | Status: SHIPPED | OUTPATIENT
Start: 2024-02-22

## 2024-02-22 NOTE — TELEPHONE ENCOUNTER
Refill Routing Note   Medication(s) are not appropriate for processing by [facility] for the following reason(s):        Outside of protocol    [facility] action(s):  Route     Requires labs : Yes             Appointments  past 12m or future 3m with PCP    Date Provider   Last Visit   11/20/2023 Nayely Garcia MD   Next Visit   Visit date not found Nayely Garcia MD   ED visits in past 90 days: 0        Note composed:6:02 PM 02/21/2024

## 2024-02-23 ENCOUNTER — TELEPHONE (OUTPATIENT)
Dept: INTERNAL MEDICINE | Facility: CLINIC | Age: 79
End: 2024-02-23
Payer: MEDICARE

## 2024-02-23 NOTE — TELEPHONE ENCOUNTER
----- Message from Samantha Au MA sent at 2/23/2024  3:17 PM CST -----  Contact: [phone]  Would you like her to stop this medication?  ----- Message -----  From: Tasha Tavares  Sent: 2/23/2024  10:20 AM CST  To: Radha REESE Staff    1MEDICALADVICE     Patient is calling for Medical Advice regarding:Patient is having dry eye with the torsemide (DEMADEX) 10 MG Tab    How long has patient had these symptoms:    Pharmacy name and phone#:    Would like response via SoftRunhart:  call     Comments:

## 2024-02-26 NOTE — TELEPHONE ENCOUNTER
Called and spoke to pt, reviewed results note from PCP. Pt verbalized understanding. Pt states she went to eye Dr, was told she doesn't have an eye infection and was given lubrication. Pt states she has been taking a half a tablet of the Demadex and her pressure has been 121/67, her pressure is 107/67 on the whole tablet. Pt asking if you would still like her to take a whole tablet?

## 2024-02-29 ENCOUNTER — NURSE TRIAGE (OUTPATIENT)
Dept: ADMINISTRATIVE | Facility: CLINIC | Age: 79
End: 2024-02-29
Payer: MEDICARE

## 2024-02-29 NOTE — TELEPHONE ENCOUNTER
Since Mardi Gras she has been having dry eye. She saw Md on Wednesday and was told to use the otc tears and was prescribed something for an eye infection called sluorometholone 1 drop twice a day. Pt stated she havent tried the prescription yet.  Pt wanted to know if she needed a second opinion. Infromed pt that it was up to her but she may want to follow md advice that was given to her to see if it works. Pt stated she will try the medication first. No further concerns.    Reason for Disposition   Health Information question, no triage required and triager able to answer question    Protocols used: Information Only Call-A-AH

## 2024-03-14 ENCOUNTER — HOSPITAL ENCOUNTER (OUTPATIENT)
Dept: RADIOLOGY | Facility: HOSPITAL | Age: 79
Discharge: HOME | End: 2024-03-14
Attending: INTERNAL MEDICINE
Payer: MEDICARE

## 2024-03-14 DIAGNOSIS — Z12.31 ENCOUNTER FOR SCREENING MAMMOGRAM FOR BREAST CANCER: ICD-10-CM

## 2024-03-14 PROCEDURE — 77067 SCR MAMMO BI INCL CAD: CPT | Mod: TC,PN

## 2024-03-14 PROCEDURE — 77063 BREAST TOMOSYNTHESIS BI: CPT | Mod: 26,,, | Performed by: RADIOLOGY

## 2024-03-14 PROCEDURE — 77067 SCR MAMMO BI INCL CAD: CPT | Mod: 26,,, | Performed by: RADIOLOGY

## 2024-04-05 RX ORDER — TORSEMIDE 10 MG/1
10 TABLET ORAL DAILY
Qty: 90 TABLET | Refills: 2 | Status: SHIPPED | OUTPATIENT
Start: 2024-04-05 | End: 2024-05-28

## 2024-04-05 NOTE — TELEPHONE ENCOUNTER
----- Message from Rachael Chen sent at 4/5/2024  1:10 PM CDT -----  Contact: Self  [phone]  Requesting an RX refill or new RX.    Is this a refill or new RX: refill    RX name and strength (copy/paste from chart):  torsemide (DEMADEX) 10 MG Tab     Is this a 30 day or 90 day RX: 90    Pharmacy name and phone # (copy/paste from chart):      [facility] [address] [address]  [address] [address] [address]  Phone: [phone] Fax: [phone]    Pt is requesting a call back.

## 2024-04-05 NOTE — TELEPHONE ENCOUNTER
No care due was identified.  [facility] Embedded Care Due Messages. Reference number: [id number].   4/05/2024 1:24:51 PM CDT

## 2024-04-05 NOTE — TELEPHONE ENCOUNTER
Refill Decision Note   Caprice Washington  is requesting a refill authorization.  Brief Assessment and Rationale for Refill:  Approve     Medication Therapy Plan:         Comments:     Note composed:2:29 PM 04/05/2024

## 2024-04-16 RX ORDER — ERGOCALCIFEROL 1.25 MG/1
CAPSULE ORAL
Qty: 4 CAPSULE | Refills: 11 | Status: SHIPPED | OUTPATIENT
Start: 2024-04-16

## 2024-04-16 NOTE — TELEPHONE ENCOUNTER
Refill Routing Note   Medication(s) are not appropriate for processing by [facility] for the following reason(s):        Outside of protocol    [facility] action(s):  Route             Appointments  past 12m or future 3m with PCP    Date Provider   Last Visit   11/20/2023 Nayely Garcia MD   Next Visit   Visit date not found Nayely Garcia MD   ED visits in past 90 days: 0        Note composed:10:08 AM 04/16/2024

## 2024-04-16 NOTE — TELEPHONE ENCOUNTER
No care due was identified.  [facility] Embedded Care Due Messages. Reference number: [id number].   4/16/2024 9:48:19 AM CDT

## 2024-04-16 NOTE — TELEPHONE ENCOUNTER
----- Message from Kaycee Benjamin sent at 4/16/2024  9:44 AM CDT -----  Contact: Mobile [phone]  Requesting an RX refill or new RX.  Is this a refill or new RX: Refill  RX name and strength  ergocalciferol (VITAMIN D2) 50,000 unit Cap  Is this a 30 day or 90 day RX: 4  Pharmacy name and phone #   [facility] [address] [address]  [address] [address] [address]  Phone: [phone] Fax: [phone]  The doctors have asked that we provide their patients with the following 2 reminders -- prescription refills can take up to 72 hours, and a friendly reminder that in the future you can use your [facility] account to request refills:

## 2024-05-06 ENCOUNTER — TELEPHONE (OUTPATIENT)
Dept: INTERNAL MEDICINE | Facility: CLINIC | Age: 79
End: 2024-05-06
Payer: MEDICARE

## 2024-05-06 NOTE — TELEPHONE ENCOUNTER
----- Message from Kaycee Benjamin sent at 5/6/2024  8:08 AM CDT -----  Contact: Mobile  [phone]  Caller is requesting an earlier appointment then we can schedule.  Caller is requesting a message be sent to the provider.  If this is for urgent care symptoms, did you offer other providers at this location, providers at other locations, or [facility]? (yes, no, n/a):  N/A  If this is for the patients physical, did you offer to schedule next available and put on wait list, or to see NP or PA for their physical?  (yes, no, n/a):  N/A  When is the next available appointment with their provider:  07/02/2024  Reason for the appointment:  Stomach issues  Patient preference of timeframe to be scheduled:  As soon as possible.  Would the patient like a call back, or a response through their [facility] portal?:   Call

## 2024-05-17 ENCOUNTER — PATIENT MESSAGE (OUTPATIENT)
Dept: ADMINISTRATIVE | Facility: HOSPITAL | Age: 79
End: 2024-05-17
Payer: MEDICARE

## 2024-05-23 ENCOUNTER — TELEPHONE (OUTPATIENT)
Dept: INTERNAL MEDICINE | Facility: CLINIC | Age: 79
End: 2024-05-23
Payer: MEDICARE

## 2024-05-23 NOTE — TELEPHONE ENCOUNTER
----- Message from Chivo Lovelace sent at 5/21/2024  9:23 AM CDT -----  Contact: [phone]  1MEDICALADVICE     Patient is calling for Medical Advice regarding:Pt has some questions     How long has patient had these symptoms:    Pharmacy name and phone#:    Would like response via Revionicshart:  call back     Comments:    Please call pt back

## 2024-05-28 ENCOUNTER — TELEPHONE (OUTPATIENT)
Dept: INTERNAL MEDICINE | Facility: CLINIC | Age: 79
End: 2024-05-28
Payer: MEDICARE

## 2024-05-28 DIAGNOSIS — R73.03 PREDIABETES: Primary | ICD-10-CM

## 2024-05-28 RX ORDER — HYDROCHLOROTHIAZIDE 25 MG/1
25 TABLET ORAL DAILY
Qty: 90 TABLET | Refills: 0 | Status: SHIPPED | OUTPATIENT
Start: 2024-05-28 | End: 2024-06-27

## 2024-05-28 NOTE — TELEPHONE ENCOUNTER
Patient is requesting 164 and 174 this morning states her normal morning sugar is around 110. She states she takes her sugar reading once a week and past 2 weeks they have been over 160. She would like to go back to hydrochlorothiazide

## 2024-05-28 NOTE — TELEPHONE ENCOUNTER
----- Message from Jannie Chen sent at 5/28/2024  1:27 PM CDT -----  Contact: [phone]  Requesting an RX refill or new RX.  Is this a refill or new RX: new    RX name and strength (copy/paste from chart):  hydrochlorothiazide   Is this a 30 day or 90 day RX:   Pharmacy name and phone # (copy/paste from chart):    [facility] [address] [address]  [address] [address] [address]  Phone: [phone] Fax: [phone]      Per pt, the torsemide (DEMADEX) 10 MG Tab 90 tablet is running her blood sugar levels up so she is requesting to go back to the hydrochlorothiazide.       The doctors have asked that we provide their patients with the following 2 reminders -- prescription refills can take up to 72 hours, and a friendly reminder that in the future you can use your [facility] account to request refills: pt is aware        Thank you

## 2024-06-10 ENCOUNTER — PATIENT MESSAGE (OUTPATIENT)
Dept: INTERNAL MEDICINE | Facility: CLINIC | Age: 79
End: 2024-06-10
Payer: MEDICARE

## 2024-06-26 ENCOUNTER — TELEPHONE (OUTPATIENT)
Dept: INTERNAL MEDICINE | Facility: CLINIC | Age: 79
End: 2024-06-26
Payer: MEDICARE

## 2024-06-26 DIAGNOSIS — H04.129 DRY EYE: Primary | ICD-10-CM

## 2024-06-26 NOTE — TELEPHONE ENCOUNTER
Put in referral to Ophthalmology for dry eye, attempted to contact pt to schedule no success lvm and number to contact department for scheduling. Sending portal msg as well.

## 2024-06-26 NOTE — TELEPHONE ENCOUNTER
----- Message from Saige Chen sent at 5/29/2024  8:40 AM CDT -----  Contact: [phone]  Patient would like to get a referral.  Referral to what specialty:  Opthalmology   Does the patient want the referral with a specific physician:  no/ fax # [phone]  [facility]  Reason (be specific):  dry eye  Does the patient already have the specialty clinic appointment scheduled:  no  If yes, what date is the appointment scheduled:     Is the insurance listed in Epic correct? (this is important for a referral):  yes  Advised patient that once provider approves this either a nurse or  will return their call?:   Would the patient like a call back, or a response through their [facility] portal?:   call  Comments:  Please call patient to confirm

## 2024-07-02 ENCOUNTER — TELEPHONE (OUTPATIENT)
Dept: INTERNAL MEDICINE | Facility: CLINIC | Age: 79
End: 2024-07-02
Payer: MEDICARE

## 2024-07-02 NOTE — TELEPHONE ENCOUNTER
Patient states her BP has been high the last couple of weeks. States he sister  in  and her readings have been elevated since. Patient last reading was 170/76. Scheduled appt with pcp for elder.

## 2024-07-02 NOTE — TELEPHONE ENCOUNTER
----- Message from Karen Delgado sent at 7/2/2024 11:48 AM CDT -----  Contact: Pt [phone]  Would like to receive medical advice.  Would they like a call back or a response via [facility]:  Call Back   Additional information:      Pt says that she is still having elevated pressure. She's been trying to reach the office to speak about this as recommended by Dr. Garcia but no one has returned her call. She'd like to speak with someone in the office. She says she's been reaching out for week.

## 2024-07-09 ENCOUNTER — OFFICE VISIT (OUTPATIENT)
Dept: INTERNAL MEDICINE | Facility: CLINIC | Age: 79
End: 2024-07-09
Payer: MEDICARE

## 2024-07-09 VITALS
BODY MASS INDEX: 33.57 KG/M2 | HEART RATE: 59 BPM | WEIGHT: 201.5 LBS | SYSTOLIC BLOOD PRESSURE: 156 MMHG | HEIGHT: 65 IN | DIASTOLIC BLOOD PRESSURE: 82 MMHG | OXYGEN SATURATION: 100 %

## 2024-07-09 DIAGNOSIS — R09.89 LABILE BLOOD PRESSURE: ICD-10-CM

## 2024-07-09 DIAGNOSIS — E55.9 VITAMIN D DEFICIENCY: ICD-10-CM

## 2024-07-09 DIAGNOSIS — E78.5 HYPERLIPIDEMIA, UNSPECIFIED HYPERLIPIDEMIA TYPE: ICD-10-CM

## 2024-07-09 DIAGNOSIS — R73.03 PREDIABETES: ICD-10-CM

## 2024-07-09 DIAGNOSIS — I10 HYPERTENSION, UNSPECIFIED TYPE: Primary | ICD-10-CM

## 2024-07-09 DIAGNOSIS — F33.41 RECURRENT MAJOR DEPRESSIVE DISORDER, IN PARTIAL REMISSION: ICD-10-CM

## 2024-07-09 DIAGNOSIS — I70.0 AORTIC ATHEROSCLEROSIS: ICD-10-CM

## 2024-07-09 PROBLEM — E11.69 DIABETES MELLITUS TYPE 2 IN OBESE: Status: RESOLVED | Noted: 2022-12-13 | Resolved: 2024-07-09

## 2024-07-09 PROBLEM — E66.9 DIABETES MELLITUS TYPE 2 IN OBESE: Status: RESOLVED | Noted: 2022-12-13 | Resolved: 2024-07-09

## 2024-07-09 PROCEDURE — 99999 PR PBB SHADOW E&M-EST. PATIENT-LVL III: CPT | Mod: PBBFAC,,, | Performed by: INTERNAL MEDICINE

## 2024-07-09 PROCEDURE — 1160F RVW MEDS BY RX/DR IN RCRD: CPT | Mod: CPTII,S$GLB,, | Performed by: INTERNAL MEDICINE

## 2024-07-09 PROCEDURE — 3288F FALL RISK ASSESSMENT DOCD: CPT | Mod: CPTII,S$GLB,, | Performed by: INTERNAL MEDICINE

## 2024-07-09 PROCEDURE — 1101F PT FALLS ASSESS-DOCD LE1/YR: CPT | Mod: CPTII,S$GLB,, | Performed by: INTERNAL MEDICINE

## 2024-07-09 PROCEDURE — G2211 COMPLEX E/M VISIT ADD ON: HCPCS | Mod: S$GLB,,, | Performed by: INTERNAL MEDICINE

## 2024-07-09 PROCEDURE — 99214 OFFICE O/P EST MOD 30 MIN: CPT | Mod: S$GLB,,, | Performed by: INTERNAL MEDICINE

## 2024-07-09 PROCEDURE — 1159F MED LIST DOCD IN RCRD: CPT | Mod: CPTII,S$GLB,, | Performed by: INTERNAL MEDICINE

## 2024-07-09 PROCEDURE — 3079F DIAST BP 80-89 MM HG: CPT | Mod: CPTII,S$GLB,, | Performed by: INTERNAL MEDICINE

## 2024-07-09 PROCEDURE — 1126F AMNT PAIN NOTED NONE PRSNT: CPT | Mod: CPTII,S$GLB,, | Performed by: INTERNAL MEDICINE

## 2024-07-09 PROCEDURE — 3077F SYST BP >= 140 MM HG: CPT | Mod: CPTII,S$GLB,, | Performed by: INTERNAL MEDICINE

## 2024-07-09 RX ORDER — DESVENLAFAXINE 100 MG/1
100 TABLET, EXTENDED RELEASE ORAL DAILY
Qty: 90 TABLET | Refills: 0 | Status: SHIPPED | OUTPATIENT
Start: 2024-07-09

## 2024-07-09 NOTE — PROGRESS NOTES
Subjective     Patient ID: Caprice Gutierrez is a 78 y.o. female.    Chief Complaint: Hypertension    HPI  Only she and younger brother alive.    Sister recently  of diffuse cancer.  Pt cannot understand why sister did not confide in her.    She misses her b/c they would talk all the time.    She is more depressed.    Taking Pristiq 50 mg and Remeron for sleep.      Actively involved with [employer].     Long standing htn.Reading shad cici controlled,  until sister's death 1 month ago.    118/61.     She stopped demadex as she thought it was contributing to dry eye.        Diabetes/prediabetes.      Hyperlipidemia with ao atherosclerosis.               She watches diet, and is NOT taking atorvastatin.    Review of Systems   Constitutional:  Negative for fever and unexpected weight change.   HENT:  Negative for nasal congestion and postnasal drip.    Eyes:  Negative for pain, discharge and visual disturbance.   Respiratory:  Negative for cough, chest tightness, shortness of breath and wheezing.    Cardiovascular:  Negative for chest pain and leg swelling.   Gastrointestinal:  Negative for abdominal pain, constipation, diarrhea and nausea.   Genitourinary:  Negative for difficulty urinating, dysuria and hematuria.   Integumentary:  Negative for rash.   Neurological:  Negative for headaches.   Psychiatric/Behavioral:  Positive for dysphoric mood. Negative for sleep disturbance. The patient is nervous/anxious.           Objective     Physical Exam  Musculoskeletal:      Right lower leg: No edema.      Left lower leg: No edema.   Psychiatric:      Comments: Tearful.  Depressed affect       170/90     Assessment and Plan     1. Hypertension, unspecified type    2. Prediabetes    3. Labile blood pressure    4. Hyperlipidemia, unspecified hyperlipidemia type  -     Comprehensive Metabolic Panel; Future; Expected date: 2024  -     Lipid Panel; Future; Expected date: 2024    5. Recurrent major depressive  disorder, in partial remission    6. BMI 33.0-33.9,adult  -     CBC Without Differential; Future; Expected date: 07/09/2024    7. Vitamin D deficiency  -     Vitamin D; Future; Expected date: 07/09/2024    Other orders  -     desvenlafaxine succinate (PRISTIQ) 100 MG Tb24; Take 1 tablet (100 mg total) by mouth once daily.  Dispense: 90 tablet; Refill: 0             Increase pristriq to 100 mg  Restart Demadex, as not cause of dry eyes.  Reassurance  Declines psychotherapy    Follow up in about 4 weeks (around 8/6/2024).

## 2024-07-31 ENCOUNTER — LAB VISIT (OUTPATIENT)
Dept: LAB | Facility: HOSPITAL | Age: 79
End: 2024-07-31
Attending: INTERNAL MEDICINE
Payer: MEDICARE

## 2024-07-31 DIAGNOSIS — E78.5 HYPERLIPIDEMIA, UNSPECIFIED HYPERLIPIDEMIA TYPE: ICD-10-CM

## 2024-07-31 DIAGNOSIS — E55.9 VITAMIN D DEFICIENCY: ICD-10-CM

## 2024-07-31 LAB
25(OH)D3+25(OH)D2 SERPL-MCNC: 37 NG/ML (ref 30–96)
ALBUMIN SERPL BCP-MCNC: 4 G/DL (ref 3.5–5.2)
ALP SERPL-CCNC: 97 U/L (ref 38–126)
ALT SERPL W/O P-5'-P-CCNC: 17 U/L (ref 10–44)
ANION GAP SERPL CALC-SCNC: 9 MMOL/L (ref 8–16)
AST SERPL-CCNC: 36 U/L (ref 15–46)
BILIRUB SERPL-MCNC: 0.5 MG/DL (ref 0.1–1)
CALCIUM SERPL-MCNC: 9.1 MG/DL (ref 8.7–10.5)
CHLORIDE SERPL-SCNC: 101 MMOL/L (ref 95–110)
CHOLEST SERPL-MCNC: 193 MG/DL (ref 120–199)
CHOLEST/HDLC SERPL: 3 {RATIO} (ref 2–5)
CO2 SERPL-SCNC: 32 MMOL/L (ref 23–29)
CREAT SERPL-MCNC: 0.93 MG/DL (ref 0.5–1.4)
ERYTHROCYTE [DISTWIDTH] IN BLOOD BY AUTOMATED COUNT: 17 % (ref 11.5–14.5)
EST. GFR  (NO RACE VARIABLE): >60 ML/MIN/1.73 M^2
GLUCOSE SERPL-MCNC: 104 MG/DL (ref 70–110)
HCT VFR BLD AUTO: 35.4 % (ref 37–48.5)
HDLC SERPL-MCNC: 65 MG/DL (ref 40–75)
HDLC SERPL: 33.7 % (ref 20–50)
HGB BLD-MCNC: 11.7 G/DL (ref 12–16)
LDLC SERPL CALC-MCNC: 111.8 MG/DL (ref 63–159)
MCH RBC QN AUTO: 28.8 PG (ref 27–31)
MCHC RBC AUTO-ENTMCNC: 33.1 G/DL (ref 32–36)
MCV RBC AUTO: 87 FL (ref 82–98)
NONHDLC SERPL-MCNC: 128 MG/DL
PLATELET # BLD AUTO: 359 K/UL (ref 150–450)
PMV BLD AUTO: 9.3 FL (ref 9.2–12.9)
POTASSIUM SERPL-SCNC: 4.5 MMOL/L (ref 3.5–5.1)
PROT SERPL-MCNC: 7.4 G/DL (ref 6–8.4)
RBC # BLD AUTO: 4.06 M/UL (ref 4–5.4)
SODIUM SERPL-SCNC: 142 MMOL/L (ref 136–145)
TRIGL SERPL-MCNC: 81 MG/DL (ref 30–150)
UUN UR-MCNC: 13 MG/DL (ref 7–17)
WBC # BLD AUTO: 6.17 K/UL (ref 3.9–12.7)

## 2024-07-31 PROCEDURE — 80061 LIPID PANEL: CPT | Performed by: INTERNAL MEDICINE

## 2024-07-31 PROCEDURE — 82306 VITAMIN D 25 HYDROXY: CPT | Mod: PN | Performed by: INTERNAL MEDICINE

## 2024-07-31 PROCEDURE — 85027 COMPLETE CBC AUTOMATED: CPT | Mod: PN | Performed by: INTERNAL MEDICINE

## 2024-07-31 PROCEDURE — 36415 COLL VENOUS BLD VENIPUNCTURE: CPT | Mod: PN | Performed by: INTERNAL MEDICINE

## 2024-07-31 PROCEDURE — 80053 COMPREHEN METABOLIC PANEL: CPT | Mod: PN | Performed by: INTERNAL MEDICINE

## 2024-08-07 ENCOUNTER — OFFICE VISIT (OUTPATIENT)
Dept: INTERNAL MEDICINE | Facility: CLINIC | Age: 79
End: 2024-08-07
Payer: MEDICARE

## 2024-08-07 VITALS
DIASTOLIC BLOOD PRESSURE: 84 MMHG | WEIGHT: 199.94 LBS | HEIGHT: 65 IN | OXYGEN SATURATION: 98 % | SYSTOLIC BLOOD PRESSURE: 148 MMHG | HEART RATE: 94 BPM | BODY MASS INDEX: 33.31 KG/M2

## 2024-08-07 DIAGNOSIS — F41.9 ANXIETY DISORDER, UNSPECIFIED TYPE: Primary | ICD-10-CM

## 2024-08-07 DIAGNOSIS — E55.9 VITAMIN D DEFICIENCY: ICD-10-CM

## 2024-08-07 DIAGNOSIS — R73.03 PREDIABETES: ICD-10-CM

## 2024-08-07 DIAGNOSIS — R09.89 LABILE BLOOD PRESSURE: ICD-10-CM

## 2024-08-07 PROCEDURE — 99214 OFFICE O/P EST MOD 30 MIN: CPT | Mod: S$GLB,,, | Performed by: INTERNAL MEDICINE

## 2024-08-07 PROCEDURE — G2211 COMPLEX E/M VISIT ADD ON: HCPCS | Mod: S$GLB,,, | Performed by: INTERNAL MEDICINE

## 2024-08-07 PROCEDURE — 1160F RVW MEDS BY RX/DR IN RCRD: CPT | Mod: CPTII,S$GLB,, | Performed by: INTERNAL MEDICINE

## 2024-08-07 PROCEDURE — 1101F PT FALLS ASSESS-DOCD LE1/YR: CPT | Mod: CPTII,S$GLB,, | Performed by: INTERNAL MEDICINE

## 2024-08-07 PROCEDURE — 1159F MED LIST DOCD IN RCRD: CPT | Mod: CPTII,S$GLB,, | Performed by: INTERNAL MEDICINE

## 2024-08-07 PROCEDURE — 1126F AMNT PAIN NOTED NONE PRSNT: CPT | Mod: CPTII,S$GLB,, | Performed by: INTERNAL MEDICINE

## 2024-08-07 PROCEDURE — 3079F DIAST BP 80-89 MM HG: CPT | Mod: CPTII,S$GLB,, | Performed by: INTERNAL MEDICINE

## 2024-08-07 PROCEDURE — 99999 PR PBB SHADOW E&M-EST. PATIENT-LVL III: CPT | Mod: PBBFAC,,, | Performed by: INTERNAL MEDICINE

## 2024-08-07 PROCEDURE — 3288F FALL RISK ASSESSMENT DOCD: CPT | Mod: CPTII,S$GLB,, | Performed by: INTERNAL MEDICINE

## 2024-08-07 PROCEDURE — 3077F SYST BP >= 140 MM HG: CPT | Mod: CPTII,S$GLB,, | Performed by: INTERNAL MEDICINE

## 2024-08-07 RX ORDER — ERGOCALCIFEROL 1.25 MG/1
CAPSULE ORAL
Qty: 12 CAPSULE | Refills: 3 | Status: SHIPPED | OUTPATIENT
Start: 2024-08-07

## 2024-08-07 RX ORDER — AMLODIPINE BESYLATE 5 MG/1
5 TABLET ORAL DAILY
Qty: 90 TABLET | Refills: 0 | Status: SHIPPED | OUTPATIENT
Start: 2024-08-07 | End: 2025-08-07

## 2024-08-07 RX ORDER — CYANOCOBALAMIN/FOLIC AC/VIT B6 2-2.5-25MG
1 TABLET ORAL DAILY
Qty: 90 TABLET | Refills: 3 | Status: SHIPPED | OUTPATIENT
Start: 2024-08-07

## 2024-08-07 RX ORDER — MIRTAZAPINE 30 MG/1
30 TABLET, FILM COATED ORAL NIGHTLY PRN
Qty: 90 TABLET | Refills: 3 | Status: SHIPPED | OUTPATIENT
Start: 2024-08-07

## 2024-08-07 RX ORDER — DESVENLAFAXINE 50 MG/1
100 TABLET, FILM COATED, EXTENDED RELEASE ORAL DAILY
Qty: 90 TABLET | Refills: 3 | Status: SHIPPED | OUTPATIENT
Start: 2024-08-07

## 2024-08-07 RX ORDER — IRBESARTAN 300 MG/1
300 TABLET ORAL NIGHTLY
Qty: 90 TABLET | Refills: 3 | Status: SHIPPED | OUTPATIENT
Start: 2024-08-07

## 2024-08-26 DIAGNOSIS — R11.0 NAUSEA: ICD-10-CM

## 2024-08-26 RX ORDER — ONDANSETRON HYDROCHLORIDE 8 MG/1
8 TABLET, FILM COATED ORAL EVERY 12 HOURS PRN
Qty: 20 TABLET | Refills: 0 | Status: SHIPPED | OUTPATIENT
Start: 2024-08-26

## 2024-08-26 NOTE — TELEPHONE ENCOUNTER
----- Message from Liliana Lovelace sent at 8/26/2024 10:50 AM CDT -----  Contact: Caprice [phone]  Pt needs a refill on ondansetron (ZOFRAN) 8 MG tablet  called into     [facility] - [facility] [address] [address]  [address] [address] [address]  Phone: [phone] Fax: [phone]      Pt mom/dad/guardian can be reached at [phone]

## 2024-08-26 NOTE — TELEPHONE ENCOUNTER
No care due was identified.  Health [facility] Embedded Care Due Messages. Reference number: [id number].   8/26/2024 3:20:03 PM CDT

## 2024-10-07 ENCOUNTER — OFFICE VISIT (OUTPATIENT)
Dept: INTERNAL MEDICINE | Facility: CLINIC | Age: 79
End: 2024-10-07
Payer: MEDICARE

## 2024-10-07 ENCOUNTER — PATIENT MESSAGE (OUTPATIENT)
Dept: ADMINISTRATIVE | Facility: HOSPITAL | Age: 79
End: 2024-10-07
Payer: MEDICARE

## 2024-10-07 VITALS
WEIGHT: 203.25 LBS | BODY MASS INDEX: 33.86 KG/M2 | OXYGEN SATURATION: 100 % | HEIGHT: 65 IN | DIASTOLIC BLOOD PRESSURE: 76 MMHG | HEART RATE: 90 BPM | SYSTOLIC BLOOD PRESSURE: 132 MMHG

## 2024-10-07 DIAGNOSIS — R09.89 LABILE BLOOD PRESSURE: Primary | ICD-10-CM

## 2024-10-07 DIAGNOSIS — E55.9 VITAMIN D DEFICIENCY: ICD-10-CM

## 2024-10-07 DIAGNOSIS — R73.03 PREDIABETES: ICD-10-CM

## 2024-10-07 DIAGNOSIS — F41.9 ANXIETY DISORDER, UNSPECIFIED TYPE: ICD-10-CM

## 2024-10-07 DIAGNOSIS — F32.5 MAJOR DEPRESSIVE DISORDER IN FULL REMISSION, UNSPECIFIED WHETHER RECURRENT: ICD-10-CM

## 2024-10-07 DIAGNOSIS — E78.5 HYPERLIPIDEMIA, UNSPECIFIED HYPERLIPIDEMIA TYPE: ICD-10-CM

## 2024-10-07 PROCEDURE — 3288F FALL RISK ASSESSMENT DOCD: CPT | Mod: CPTII,S$GLB,, | Performed by: INTERNAL MEDICINE

## 2024-10-07 PROCEDURE — 3078F DIAST BP <80 MM HG: CPT | Mod: CPTII,S$GLB,, | Performed by: INTERNAL MEDICINE

## 2024-10-07 PROCEDURE — 1101F PT FALLS ASSESS-DOCD LE1/YR: CPT | Mod: CPTII,S$GLB,, | Performed by: INTERNAL MEDICINE

## 2024-10-07 PROCEDURE — 1159F MED LIST DOCD IN RCRD: CPT | Mod: CPTII,S$GLB,, | Performed by: INTERNAL MEDICINE

## 2024-10-07 PROCEDURE — 1160F RVW MEDS BY RX/DR IN RCRD: CPT | Mod: CPTII,S$GLB,, | Performed by: INTERNAL MEDICINE

## 2024-10-07 PROCEDURE — 99999 PR PBB SHADOW E&M-EST. PATIENT-LVL III: CPT | Mod: PBBFAC,,, | Performed by: INTERNAL MEDICINE

## 2024-10-07 PROCEDURE — 1126F AMNT PAIN NOTED NONE PRSNT: CPT | Mod: CPTII,S$GLB,, | Performed by: INTERNAL MEDICINE

## 2024-10-07 PROCEDURE — G2211 COMPLEX E/M VISIT ADD ON: HCPCS | Mod: S$GLB,,, | Performed by: INTERNAL MEDICINE

## 2024-10-07 PROCEDURE — 99213 OFFICE O/P EST LOW 20 MIN: CPT | Mod: S$GLB,,, | Performed by: INTERNAL MEDICINE

## 2024-10-07 PROCEDURE — 3075F SYST BP GE 130 - 139MM HG: CPT | Mod: CPTII,S$GLB,, | Performed by: INTERNAL MEDICINE

## 2024-10-07 RX ORDER — DESVENLAFAXINE 100 MG/1
TABLET, EXTENDED RELEASE ORAL
Start: 2024-10-07

## 2024-10-07 NOTE — PROGRESS NOTES
Subjective     Patient ID: Caprice Gutierrez is a 79 y.o. female.    Chief Complaint: Follow-up    HPI  BP by her machine  156/95, 132/83    By ours 132/76 , 138/86    122/76, 132/76, 124/81    Anxiety fairly well controlled with Pristiq 50 mg bid.  Review of Systems   Constitutional:  Negative for fever and unexpected weight change.   HENT:  Negative for nasal congestion and postnasal drip.    Eyes:  Negative for pain, discharge and visual disturbance.   Respiratory:  Negative for cough, chest tightness, shortness of breath and wheezing.    Cardiovascular:  Negative for chest pain and leg swelling.   Gastrointestinal:  Negative for abdominal pain, constipation, diarrhea and nausea.   Genitourinary:  Negative for difficulty urinating, dysuria and hematuria.   Integumentary:  Negative for rash.   Neurological:  Negative for headaches.   Psychiatric/Behavioral:  Negative for dysphoric mood and sleep disturbance. The patient is not nervous/anxious.           Objective     Physical Exam  Constitutional:       General: She is not in acute distress.     Appearance: Normal appearance. She is not ill-appearing, toxic-appearing or diaphoretic.   Neurological:      Mental Status: She is alert.   Psychiatric:         Mood and Affect: Mood normal.         Behavior: Behavior normal.         Thought Content: Thought content normal.            Assessment and Plan     1. Labile blood pressure    2. Major depressive disorder in full remission, unspecified whether recurrent    3. Anxiety disorder, unspecified type    Other orders  -     desvenlafaxine succinate (PRISTIQ) 100 MG Tb24; Half  tab po bid        Continue present BP meds and pristiq 50 mg bid         Follow up in about 1 year (around 10/7/2025) for PE.

## 2024-11-06 NOTE — TELEPHONE ENCOUNTER
No care due was identified.  [facility] Embedded Care Due Messages. Reference number: [id number].   11/06/2024 4:29:45 PM CST

## 2024-11-06 NOTE — TELEPHONE ENCOUNTER
----- Message from Cameron sent at 11/6/2024  2:36 PM CST -----  Contact: pt @ [phone]  Requesting an RX refill or new RX.     Is this a refill or new RX: refill     RX name and strength (copy/paste from chart):  amLODIPine (NORVASC) 5 MG tablet     Is this a 30 day or 90 day RX: 90     Pharmacy name and phone # (copy/paste from chart):    [facility] - [facility] [address] [address]  [address] [address] [address]  Phone: [phone] Fax: [phone]        The doctors have asked that we provide their patients with the following 2 reminders -- prescription refills can take up to 72 hours, and a friendly reminder that in the future you can use your [facility] account to request refills: yes

## 2024-11-07 RX ORDER — AMLODIPINE BESYLATE 5 MG/1
5 TABLET ORAL DAILY
Qty: 90 TABLET | Refills: 0 | Status: SHIPPED | OUTPATIENT
Start: 2024-11-07 | End: 2025-11-07

## 2024-11-07 NOTE — TELEPHONE ENCOUNTER
Refill Routing Note   Medication(s) are not appropriate for processing by [facility] for the following reason(s):        New or recently adjusted medication    [facility] action(s):  Defer               Appointments  past 12m or future 3m with PCP    Date Provider   Last Visit   10/7/2024 Nayely Garcia MD   Next Visit   Visit date not found Nayely Garcia MD   ED visits in past 90 days: 0        Note composed:6:29 PM 11/06/2024

## 2024-12-18 ENCOUNTER — TELEPHONE (OUTPATIENT)
Dept: INTERNAL MEDICINE | Facility: CLINIC | Age: 79
End: 2024-12-18
Payer: MEDICARE

## 2024-12-18 NOTE — TELEPHONE ENCOUNTER
----- Message from Caroline sent at 12/18/2024  8:05 AM CST -----  Contact: [phone]  Requesting an RX refill or new RX.    Is this a refill or new RX: new    RX name and strength (copy/paste from chart):  hydroCHLOROthiazide (HYDRODIURIL) 25 MG tablet    Is this a 30 day or 90 day RX:     Pharmacy name and phone # (copy/paste from chart):    [facility] [address] [address]  [address] [address] [address]  Phone: [phone] Fax: [phone]       The doctors have asked that we provide their patients with the following 2 reminders -- prescription refills can take up to 72 hours, and a friendly reminder that in the future you can use your [facility] account to request refills:yes

## 2024-12-19 RX ORDER — HYDROCHLOROTHIAZIDE 25 MG/1
25 TABLET ORAL DAILY
Qty: 90 TABLET | Refills: 3 | Status: SHIPPED | OUTPATIENT
Start: 2024-12-19 | End: 2025-12-19

## 2025-01-02 ENCOUNTER — OFFICE VISIT (OUTPATIENT)
Dept: INTERNAL MEDICINE | Facility: CLINIC | Age: 80
End: 2025-01-02
Payer: MEDICARE

## 2025-01-02 ENCOUNTER — NURSE TRIAGE (OUTPATIENT)
Dept: ADMINISTRATIVE | Facility: CLINIC | Age: 80
End: 2025-01-02
Payer: MEDICARE

## 2025-01-02 VITALS
WEIGHT: 207.44 LBS | HEART RATE: 84 BPM | DIASTOLIC BLOOD PRESSURE: 80 MMHG | OXYGEN SATURATION: 97 % | SYSTOLIC BLOOD PRESSURE: 132 MMHG | BODY MASS INDEX: 34.52 KG/M2

## 2025-01-02 DIAGNOSIS — Z23 NEED FOR TETANUS BOOSTER: ICD-10-CM

## 2025-01-02 DIAGNOSIS — F33.41 RECURRENT MAJOR DEPRESSIVE DISORDER, IN PARTIAL REMISSION: Primary | ICD-10-CM

## 2025-01-02 DIAGNOSIS — R11.0 NAUSEA: ICD-10-CM

## 2025-01-02 PROCEDURE — 99999 PR PBB SHADOW E&M-EST. PATIENT-LVL IV: CPT | Mod: PBBFAC,,,

## 2025-01-02 NOTE — PROGRESS NOTES
"INTERNAL MEDICINE URGENT CARE NOTE    CHIEF COMPLAINT     nausea    HPI     GASTROINTESTINAL:  She has had nausea for one week (occurring the day after [place]), accompanied by belching and stomach growling. She has a history of GERD. She thought symptoms may be attributable to Amlodipine due to stated side effects on [employer]. However, she started medication in September and has experienced no symptoms previously. Nausea improves when not taking amlodipine and with dietary modifications. Reports her diet has been "bad" over the holidays. Yesterday she had a normal bland diet and experienced no nausea. She has been taking Nexium for many years.     HYPERTENSION:  She has been taking amlodipine since September with good blood pressure control and without side effects.    DEPRESSION:  She was diagnosed with depression following the COVID pandemic. She takes Pristiq and Remeron for depression but not regularly. Her emotional health has been significantly impacted by the loss of her sister and a friend. Denies SI/HI. Her psychiatrist moved away and she has not seen anyone since.    ROS:  General: -fever, -chills, -fatigue, -weight gain, -weight loss  Eyes: -vision changes, -redness, -discharge  ENT: -ear pain, -nasal congestion, -sore throat  Cardiovascular: -chest pain, -palpitations, -lower extremity edema  Respiratory: -cough, -shortness of breath  Gastrointestinal: -abdominal pain, +nausea, -vomiting, -diarrhea, -constipation, -blood in stool  Genitourinary: -dysuria, -hematuria, -frequency  Musculoskeletal: -joint pain, -muscle pain  Skin: -rash, -lesion  Neurological: -headache, -dizziness, -numbness, -tingling  Psychiatric: -anxiety, -depression, -sleep difficulty        Past Medical History:  Past Medical History:   Diagnosis Date    Abnormal Pap smear 1985, 2000    LEEP, C    Allergy     Anemia     Anxiety     Degenerative arthritis of shoulder region     Depression     Diabetes mellitus type II     GERD " (gastroesophageal reflux disease)     Headache     Hemangioma of liver     Hx of psychiatric care     Hyperlipidemia     Hypertension     Keloid cicatrix     Rachael     Antidepressant-induced.    Obesity     Psychiatric problem     Therapy      Home Medications:  Prior to Admission medications    Medication Sig Start Date End Date Taking? Authorizing Provider   aspirin (ECOTRIN) 81 MG EC tablet Take 81 mg by mouth once daily.   Yes Provider, Historical   desvenlafaxine succinate (PRISTIQ) 100 MG Tb24 Half  tab po bid 10/7/24  Yes Nayely Garcia MD   ergocalciferol (VITAMIN D2) 50,000 unit Cap TAKE ONE CAPSULE BY MOUTH EVERY 7 DAYS 8/7/24  Yes Nayely Garcia MD   esomeprazole (NEXIUM) 20 MG capsule Take 20 mg by mouth before breakfast.   Yes Provider, Historical   folic acid-vit B6-vit B12 2.5-25-2 mg (NIVA-FOL) 2.5-25-2 mg Tab Take 1 tablet by mouth once daily. 8/7/24  Yes Nayely Garcia MD   hydroCHLOROthiazide (HYDRODIURIL) 25 MG tablet Take 1 tablet (25 mg total) by mouth once daily. 12/19/24 12/19/25 Yes Nayely Garcia MD   irbesartan (AVAPRO) 300 MG tablet Take 1 tablet (300 mg total) by mouth every evening. 8/7/24  Yes Nayely Garcia MD   mirtazapine (REMERON) 30 MG tablet Take 1 tablet (30 mg total) by mouth nightly as needed. 8/7/24  Yes Nayley Garcia MD   ondansetron (ZOFRAN) 8 MG tablet Take 1 tablet (8 mg total) by mouth every 12 (twelve) hours as needed for Nausea. 8/26/24  Yes Nayely Garcia MD   amLODIPine (NORVASC) 5 MG tablet Take 1 tablet (5 mg total) by mouth once daily.  Patient not taking: Reported on 1/2/2025 11/7/24 11/7/25  Shandra Day PA-C   atorvastatin (LIPITOR) 10 MG tablet Take 4 tablets (40 mg total) by mouth once daily.  Patient not taking: Reported on 10/7/2024 5/11/23 5/10/24  Wen Dalton NP   blood sugar diagnostic (CONTOUR TEST STRIPS) Strp TEST ONE TO 3 TIMES A DAY AS DIRECTED  Patient not taking: Reported on 1/2/2025 7/28/22   Nayely Garcia,  MD   MICROLET LANCET Misc USE ONE TO 3 TIMES DAILY  Patient not taking: Reported on 1/2/2025 7/28/22   Nayely Garcia MD   sertraline (ZOLOFT) 25 MG tablet Take 1 tablet (25 mg total) by mouth once daily. 9/8/20 9/18/20  Shandra Day, PAJovanniC       PHYSICAL EXAM     Vitals: Blood pressure: 132/80.  General: No acute distress. Well-developed. Well-nourished.  Eyes: EOMI. Sclerae anicteric.  HENT: Normocephalic. Atraumatic. Nares patent. Moist oral mucosa.  Ears: Bilateral TMs clear. Bilateral EACs clear.  Cardiovascular: Regular rate. Regular rhythm. No murmurs. No rubs. No gallops. Normal S1, S2.  Respiratory: Normal respiratory effort. Clear to auscultation bilaterally. No rales. No rhonchi. No wheezing.  Abdomen: Soft. Non-tender. Non-distended. Normoactive bowel sounds.  Musculoskeletal: No  obvious deformity.  Extremities: No lower extremity edema.  Neurological: Alert & oriented x3. No slurred speech. Normal gait.  Psychiatric: Normal mood. Normal affect. Good insight. Good judgment.  Skin: Warm. Dry. No rash.        /80   Pulse 84   Wt 94.1 kg (207 lb 7.3 oz)   SpO2 97%   BMI 34.52 kg/m²     ASSESSMENT/PLAN     IMPRESSION:  - Assessed GI symptoms as likely related to recent dietary indiscretions during holiday period rather than side effect of amlodipine  - Continued current antihypertensive regimen including amlodipine, given good blood pressure control   - Evaluated mental health status and history of depression    HYPERTENSION:  - Evaluated the patient's blood pressure readings: 132/80, 131/74, 124/72 with amlodipine  - Assessed that the blood pressure is well-controlled with medication and recent readings are good.  - Continue amlodipine, irbesartan and hydrochlorothiazide for hypertension management.    ACID REFLUX:  - Evaluated the patient's symptoms of acid reflux, including belching, abdominal growling, and nausea.  - Assessed that the reflux symptoms are likely diet-related,  particularly due to holiday eating habits rather than medication side effects.  - Recommend reassessment if symptoms persist despite dietary modifications. May need to change to PPI if unresolved    DEPRESSION:  - Noted the patient's history of depression diagnosed after the COVID-19 pandemic.  - Evaluated that the patient is not taking depression medication regularly, only as needed for sleep issues.  - Acknowledged the patient's recent emotional struggles and need for mental health support.  - Referred the patient to psychiatry for evaluation and ongoing mental health support.    NAUSEA:  - Evaluated the patient's report of experiencing nausea, particularly in the mornings, since around [place].  - Assessed that the nausea is likely related to diet and holiday eating habits rather than medication side effects.    TETANUS VACCINATION:  - Tetanus vaccine administered in office.    FOLLOW UP:  - Caprice to continue with improved diet as initiated after the holidays.          Patient education provided from Sita. Patient was counseled on when and how to seek emergent care.   This note was generated with the assistance of ambient listening technology. Verbal consent was obtained by the patient and accompanying visitor(s) for the recording of patient appointment to facilitate this note. I attest to having reviewed and edited the generated note for accuracy, though some syntax or spelling errors may persist. Please contact the author of this note for any clarification.       Kirstin DAVIS, APRN, FNP-c   Department of Internal Medicine - [facility]  10:29 AM

## 2025-01-02 NOTE — TELEPHONE ENCOUNTER
One day after [place] she got weak, nauseated and her hands got cold. Since then when she get up in morning she felt nauseated. Monday and tuesday no sleep because she has to prop up in the bed. Yesterday she couldn't eat. Care advice recommends pt see md within 3 days. Appt given.  Reason for Disposition   Nausea lasts > 1 week    Additional Information   Negative: Shock suspected (e.g., cold/pale/clammy skin, too weak to stand, low BP, rapid pulse)   Negative: Sounds like a life-threatening emergency to the triager   Negative: Unable to walk, or can only walk with assistance (e.g., requires support)   Negative: Difficulty breathing   Negative: [1] Insulin-dependent diabetes (Type I) AND [2] glucose > 400 mg/dL (22 mmol/L)   Negative: [1] Drinking very little AND [2] dehydration suspected (e.g., no urine > 12 hours, very dry mouth, very lightheaded)   Negative: Patient sounds very sick or weak to the triager   Negative: Fever > 104 F (40 C)   Negative: [1] Fever > 101 F (38.3 C) AND [2] age > 60 years   Negative: [1] Fever > 100 F (37.8 C) AND [2] bedridden (e.g., CVA, chronic illness, recovering from surgery)   Negative: [1] Fever > 100 F (37.8 C) AND [2] diabetes mellitus or weak immune system (e.g., HIV positive, cancer chemo, splenectomy, organ transplant, chronic steroids)   Negative: Taking any of the following medications: digoxin (Lanoxin), lithium, theophylline, phenytoin (Dilantin)   Negative: Yellowish color of the skin or white of the eye (i.e., jaundice)   Negative: Fever present > 3 days (72 hours)   Negative: Receiving cancer chemotherapy medication   Negative: Taking prescription medication that could cause nausea (e.g., narcotics/opiates, antibiotics, OCPs, many others)    Protocols used: Nausea-A-AH

## 2025-02-07 DIAGNOSIS — Z86.39 HISTORY OF DIABETES MELLITUS, TYPE II: ICD-10-CM

## 2025-02-07 RX ORDER — LANCETS 33 GAUGE
EACH MISCELLANEOUS
Qty: 300 EACH | Refills: 3 | Status: SHIPPED | OUTPATIENT
Start: 2025-02-07

## 2025-02-07 NOTE — TELEPHONE ENCOUNTER
Refill Routing Note   Medication(s) are not appropriate for processing by [facility] for the following reason(s):        Due for refill >6 months ago    [facility] action(s):  Defer               Appointments  past 12m or future 3m with PCP    Date Provider   Last Visit   10/7/2024 Nayely Garcia MD   Next Visit   Visit date not found Nayely Garcia MD   ED visits in past 90 days: 0        Note composed:11:28 AM 02/07/2025

## 2025-02-07 NOTE — TELEPHONE ENCOUNTER
No care due was identified.  [facility] Embedded Care Due Messages. Reference number: [id number].   2/07/2025 9:05:55 AM CST   What Type Of Note Output Would You Prefer (Optional)?: Standard Output How Severe Is Your Skin Lesion?: mild Has Your Skin Lesion Been Treated?: not been treated Is This A New Presentation, Or A Follow-Up?: Skin Lesion Which Family Member (Optional)?: Grandfather

## 2025-02-18 DIAGNOSIS — R11.0 NAUSEA: ICD-10-CM

## 2025-02-18 NOTE — TELEPHONE ENCOUNTER
No care due was identified.  [facility] Embedded Care Due Messages. Reference number: [id number].   2/18/2025 1:34:51 PM CST

## 2025-02-18 NOTE — TELEPHONE ENCOUNTER
----- Message from Shaista sent at 2/18/2025  8:43 AM CST -----  Contact: [phone]  Requesting an RX refill or new RX.Is this a refill or new RX: refillRX name and strength (copy/paste from chart):  ondansetron (ZOFRAN) 8 MG tabletIs this a 30 day or 90 day RX: 20Pharmacy name and phone # (copy/paste from chart):  [facility] [address] [facility] [id number] [address]Clnxp: [phone] Fax: [phone] The doctors have asked that we provide their patients with the following 2 reminders -- prescription refills can take up to 72 hours, and a friendly reminder that in the future you can use your [facility] account to request refills:

## 2025-02-19 RX ORDER — ONDANSETRON HYDROCHLORIDE 8 MG/1
8 TABLET, FILM COATED ORAL EVERY 12 HOURS PRN
Qty: 20 TABLET | Refills: 0 | Status: SHIPPED | OUTPATIENT
Start: 2025-02-19

## 2025-02-19 NOTE — TELEPHONE ENCOUNTER
Refill Routing Note   Medication(s) are not appropriate for processing by [facility] for the following reason(s):      Medication outside of protocol    [facility] action(s):  Route Care Due:  None identified            Appointments  past 12m or future 3m with PCP    Date Provider   Last Visit   10/7/2024 Nayely Garcia MD   Next Visit   Visit date not found Nayely Garcia MD   ED visits in past 90 days: 0        Note composed:8:26 PM 02/18/2025

## 2025-04-01 ENCOUNTER — TELEPHONE (OUTPATIENT)
Dept: INTERNAL MEDICINE | Facility: CLINIC | Age: 80
End: 2025-04-01
Payer: MEDICARE

## 2025-04-01 DIAGNOSIS — Z12.31 ENCOUNTER FOR SCREENING MAMMOGRAM FOR MALIGNANT NEOPLASM OF BREAST: Primary | ICD-10-CM

## 2025-04-01 NOTE — TELEPHONE ENCOUNTER
----- Message from Kaycee sent at 4/1/2025  7:24 AM CDT -----  Contact: Mobile  [phone]  Caller is requesting to schedule their annual screening mammogram appointment. Order is not listed in Epic.  Please enter order and contact patient to schedule.Would the patient like a call back, or a response through their [facility] portal?:   CallWhere would they like the mammogram performed?: [facility] in [place]

## 2025-04-22 ENCOUNTER — TELEPHONE (OUTPATIENT)
Dept: INTERNAL MEDICINE | Facility: CLINIC | Age: 80
End: 2025-04-22
Payer: MEDICARE

## 2025-04-22 RX ORDER — TORSEMIDE 10 MG/1
10 TABLET ORAL
Qty: 90 TABLET | Refills: 2 | OUTPATIENT
Start: 2025-04-22

## 2025-04-22 NOTE — TELEPHONE ENCOUNTER
Refill Decision Note  Alana DC. Inappropriate Request     Caprice Gutierrez  is requesting a refill authorization.  Brief Assessment and Rationale for Refill:  Quick Discontinue     Medication Therapy Plan:  prescription was discontinued on 5/28/2024 by Nayely Garcia MD.    Medication Reconciliation Completed: No   Comments:           Note composed:2:14 PM 04/22/2025

## 2025-04-22 NOTE — TELEPHONE ENCOUNTER
No care due was identified.  [facility] Embedded Care Due Messages. Reference number: [id number].   4/22/2025 8:52:25 AM CDT

## 2025-04-22 NOTE — TELEPHONE ENCOUNTER
----- Message from Chivo sent at 4/22/2025  7:12 AM CDT -----  Contact: [phone]  Requesting an RX refill or new RX.Is this a refill or new RX: NewRX name and strength (copy/paste from chart):  Demadex Is this a 30 day or 90 day RX: Pharmacy name and phone # (copy/paste from chart):  [facility] [address] [facility] [id number] [address] [address]Qfdzn: [phone] Fax: [phone] doctors have asked that we provide their patients with the following 2 reminders -- prescription refills can take up to 72 hours, and a friendly reminder that in the future you can use your [facility] account to request refills: call back Pt wants to get back on Demadex

## 2025-04-23 ENCOUNTER — TELEPHONE (OUTPATIENT)
Dept: INTERNAL MEDICINE | Facility: CLINIC | Age: 80
End: 2025-04-23
Payer: MEDICARE

## 2025-04-23 RX ORDER — TORSEMIDE 10 MG/1
10 TABLET ORAL DAILY
Qty: 90 TABLET | Refills: 3 | Status: SHIPPED | OUTPATIENT
Start: 2025-04-23 | End: 2026-04-23

## 2025-04-23 NOTE — TELEPHONE ENCOUNTER
Attempted to call pt back about Demadex refill but no response at this time. Left VM to call office when available.

## 2025-04-23 NOTE — TELEPHONE ENCOUNTER
Patient  thought that demadex was giving her dry eye that why she requested to get off of it. She realized that the dry eyes are still an issue even on HCTZ. Patient does not want to change meds

## 2025-05-08 RX ORDER — IRBESARTAN 300 MG/1
300 TABLET ORAL NIGHTLY
Qty: 90 TABLET | Refills: 0 | Status: SHIPPED | OUTPATIENT
Start: 2025-05-08

## 2025-05-08 NOTE — TELEPHONE ENCOUNTER
Provider Staff:  Action required for this patient    Requires labs      Please see care gap opportunities below in Care Due Message.    Thanks!  [facility]     Appointments      Date Provider   Last Visit   10/7/2024 Nayely Garcia MD   Next Visit   Visit date not found Nayely Garcia MD     Refill Decision Note   Caprice Gutierrez  is requesting a refill authorization.  Brief Assessment and Rationale for Refill:  Approve     Medication Therapy Plan:        Comments:     Note composed:11:13 AM 05/08/2025            
Care Due:                  Date            Visit Type   Department     Provider  --------------------------------------------------------------------------------                                EP -                              PRIMARY      [facility]  Last Visit: 10-      CARE ([facility])   MEDICINE       Nayely Garcia  Next Visit: None Scheduled  None         None Found                                                            Last  Test          Frequency    Reason                     Performed    Due Date  --------------------------------------------------------------------------------    CBC.........  12 months..  mirtazapine..............  07- 07-    CMP.........  12 months..  desvenlafaxine,            07- 07-                             ergocalciferol,                             irbesartan, mirtazapine,                             torsemide................    Vitamin D...  12 months..  ergocalciferol...........  07- 07-    [facility] Embedded Care Due Messages. Reference number: [id number].   5/08/2025 9:11:32 AM CDT  
normal...

## 2025-05-12 ENCOUNTER — TELEPHONE (OUTPATIENT)
Dept: INTERNAL MEDICINE | Facility: CLINIC | Age: 80
End: 2025-05-12
Payer: MEDICARE

## 2025-05-13 ENCOUNTER — OFFICE VISIT (OUTPATIENT)
Dept: INTERNAL MEDICINE | Facility: CLINIC | Age: 80
End: 2025-05-13
Payer: MEDICARE

## 2025-05-13 VITALS
HEIGHT: 65 IN | SYSTOLIC BLOOD PRESSURE: 146 MMHG | HEART RATE: 86 BPM | BODY MASS INDEX: 34.07 KG/M2 | TEMPERATURE: 98 F | WEIGHT: 204.5 LBS | DIASTOLIC BLOOD PRESSURE: 92 MMHG | RESPIRATION RATE: 18 BRPM | OXYGEN SATURATION: 96 %

## 2025-05-13 DIAGNOSIS — R11.0 NAUSEA: Primary | ICD-10-CM

## 2025-05-13 PROCEDURE — 3077F SYST BP >= 140 MM HG: CPT | Mod: CPTII,S$GLB,, | Performed by: PHYSICIAN ASSISTANT

## 2025-05-13 PROCEDURE — 1126F AMNT PAIN NOTED NONE PRSNT: CPT | Mod: CPTII,S$GLB,, | Performed by: PHYSICIAN ASSISTANT

## 2025-05-13 PROCEDURE — 3080F DIAST BP >= 90 MM HG: CPT | Mod: CPTII,S$GLB,, | Performed by: PHYSICIAN ASSISTANT

## 2025-05-13 PROCEDURE — 1159F MED LIST DOCD IN RCRD: CPT | Mod: CPTII,S$GLB,, | Performed by: PHYSICIAN ASSISTANT

## 2025-05-13 PROCEDURE — 99999 PR PBB SHADOW E&M-EST. PATIENT-LVL IV: CPT | Mod: PBBFAC,,, | Performed by: PHYSICIAN ASSISTANT

## 2025-05-13 PROCEDURE — 99213 OFFICE O/P EST LOW 20 MIN: CPT | Mod: S$GLB,,, | Performed by: PHYSICIAN ASSISTANT

## 2025-05-13 PROCEDURE — 1160F RVW MEDS BY RX/DR IN RCRD: CPT | Mod: CPTII,S$GLB,, | Performed by: PHYSICIAN ASSISTANT

## 2025-05-13 RX ORDER — ONDANSETRON 4 MG/1
4 TABLET, ORALLY DISINTEGRATING ORAL EVERY 12 HOURS PRN
Qty: 30 TABLET | Refills: 0 | Status: SHIPPED | OUTPATIENT
Start: 2025-05-13

## 2025-05-13 NOTE — PROGRESS NOTES
"Subjective:       Patient ID: Caprice Gutierrez is a 79 y.o. female.        Chief Complaint: Follow-up    Caprice Gutierrez is an established patient of Nayely Garcia MD here today for urgent care visit.    Anxiety, depression -   Will be seeing a new psychiatrist  Pristiq 100 mg daily  Her sister  which triggered worsening depression but now feels back to baseline    HTN -   Amlodipine 5 mg daily  Irbesartan 300 mg daily    Lipids - not taking atorvastatin    Vitamin d def on supplement    Here today as she was looking at herself in the mirror and feels like one side of her "love handle" is bigger than the other  She grew concerned  She pushed on the area and didn't feel anything abnormal "it's just fat" but wanted someone else to examine it  She notes that she worries about things and needs reassurance            Review of Systems   Constitutional:  Negative for chills, diaphoresis, fatigue and fever.   HENT:  Negative for congestion and sore throat.    Eyes:  Negative for visual disturbance.   Respiratory:  Negative for cough, chest tightness and shortness of breath.    Cardiovascular:  Negative for chest pain, palpitations and leg swelling.   Gastrointestinal:  Negative for abdominal pain, blood in stool, constipation, diarrhea, nausea and vomiting.   Genitourinary:  Negative for dysuria, frequency, hematuria and urgency.   Musculoskeletal:  Negative for arthralgias and back pain.   Skin:  Negative for rash.   Neurological:  Negative for dizziness, syncope, weakness and headaches.   Psychiatric/Behavioral:  Negative for dysphoric mood and sleep disturbance. The patient is not nervous/anxious.        Objective:      Physical Exam  Vitals and nursing note reviewed.   Constitutional:       Appearance: Normal appearance. She is well-developed.   HENT:      Head: Normocephalic.      Right Ear: External ear normal.      Left Ear: External ear normal.   Eyes:      Pupils: Pupils are equal, round, and " "reactive to light.   Cardiovascular:      Rate and Rhythm: Normal rate and regular rhythm.      Heart sounds: Normal heart sounds. No murmur heard.     No friction rub. No gallop.   Pulmonary:      Effort: Pulmonary effort is normal. No respiratory distress.      Breath sounds: Normal breath sounds.   Abdominal:      Palpations: Abdomen is soft.      Tenderness: There is no abdominal tenderness.          Comments: Multiple scars on abdomen from prior surgeries     Skin:     General: Skin is warm and dry.   Neurological:      Mental Status: She is alert.         Assessment:       1. Nausea        Plan:       Caprice was seen today for follow-up.    Diagnoses and all orders for this visit:    Nausea  -     ondansetron (ZOFRAN-ODT) 4 MG TbDL; Take 1 tablet (4 mg total) by mouth every 12 (twelve) hours as needed (nausea).    Refilled zofran per patient request - prefers ODT formulation so changed on med list  Reassured that there was nothing worrisome on abdominal exam today    Pt has been given instructions populated from patient instructions database and has verbalized understanding of the after visit summary and information contained wherein.    Follow up with a primary care provider. May go to ER for acute shortness of breath, lightheadedness, fever, or any other emergent complaints or changes in condition.    "This note will be shared with the patient"    Future Appointments   Date Time Provider Department Center   5/21/2025 11:30 AM [facility] [id number] [facility]   8/4/2025  9:00 AM Gracie Crisostomo MD [facility]                 "

## 2025-05-21 ENCOUNTER — HOSPITAL ENCOUNTER (OUTPATIENT)
Dept: RADIOLOGY | Facility: HOSPITAL | Age: 80
Discharge: HOME | End: 2025-05-21
Attending: INTERNAL MEDICINE
Payer: MEDICARE

## 2025-05-21 DIAGNOSIS — Z12.31 ENCOUNTER FOR SCREENING MAMMOGRAM FOR MALIGNANT NEOPLASM OF BREAST: ICD-10-CM

## 2025-05-21 PROCEDURE — 77063 BREAST TOMOSYNTHESIS BI: CPT | Mod: 26,,, | Performed by: STUDENT IN AN ORGANIZED HEALTH CARE EDUCATION/TRAINING PROGRAM

## 2025-05-21 PROCEDURE — 77067 SCR MAMMO BI INCL CAD: CPT | Mod: TC,PN

## 2025-05-21 PROCEDURE — 77067 SCR MAMMO BI INCL CAD: CPT | Mod: 26,,, | Performed by: STUDENT IN AN ORGANIZED HEALTH CARE EDUCATION/TRAINING PROGRAM

## 2025-05-26 NOTE — TELEPHONE ENCOUNTER
No care due was identified.  [facility] Embedded Care Due Messages. Reference number: [id number].   5/26/2025 10:39:13 AM CDT

## 2025-05-27 RX ORDER — AMLODIPINE BESYLATE 5 MG/1
5 TABLET ORAL
Qty: 90 TABLET | Refills: 0 | Status: SHIPPED | OUTPATIENT
Start: 2025-05-27

## 2025-05-27 NOTE — TELEPHONE ENCOUNTER
Refill Routing Note   Medication(s) are not appropriate for processing by [facility] for the following reason(s):        Required vitals abnormal    [facility] action(s):  Defer               Appointments  past 12m or future 3m with PCP    Date Provider   Last Visit   10/7/2024 Nayely Garcia MD   Next Visit   Visit date not found Nayely Garcia MD   ED visits in past 90 days: 0        Note composed:8:22 PM 05/26/2025

## 2025-05-29 ENCOUNTER — OFFICE VISIT (OUTPATIENT)
Dept: PSYCHIATRY | Facility: CLINIC | Age: 80
End: 2025-05-29
Payer: MEDICARE

## 2025-05-29 VITALS
SYSTOLIC BLOOD PRESSURE: 168 MMHG | DIASTOLIC BLOOD PRESSURE: 92 MMHG | HEART RATE: 98 BPM | BODY MASS INDEX: 33.09 KG/M2 | WEIGHT: 198.88 LBS

## 2025-05-29 DIAGNOSIS — F43.23 ADJUSTMENT DISORDER WITH MIXED ANXIETY AND DEPRESSED MOOD: Chronic | ICD-10-CM

## 2025-05-29 DIAGNOSIS — F41.1 GENERALIZED ANXIETY DISORDER: Primary | Chronic | ICD-10-CM

## 2025-05-29 PROCEDURE — 1159F MED LIST DOCD IN RCRD: CPT | Mod: CPTII,S$GLB,, | Performed by: PSYCHIATRY & NEUROLOGY

## 2025-05-29 PROCEDURE — 3080F DIAST BP >= 90 MM HG: CPT | Mod: CPTII,S$GLB,, | Performed by: PSYCHIATRY & NEUROLOGY

## 2025-05-29 PROCEDURE — G2211 COMPLEX E/M VISIT ADD ON: HCPCS | Mod: S$GLB,,, | Performed by: PSYCHIATRY & NEUROLOGY

## 2025-05-29 PROCEDURE — 99215 OFFICE O/P EST HI 40 MIN: CPT | Mod: S$GLB,,, | Performed by: PSYCHIATRY & NEUROLOGY

## 2025-05-29 PROCEDURE — 1160F RVW MEDS BY RX/DR IN RCRD: CPT | Mod: CPTII,S$GLB,, | Performed by: PSYCHIATRY & NEUROLOGY

## 2025-05-29 PROCEDURE — 99999 PR PBB SHADOW E&M-EST. PATIENT-LVL III: CPT | Mod: PBBFAC,,, | Performed by: PSYCHIATRY & NEUROLOGY

## 2025-05-29 PROCEDURE — 90833 PSYTX W PT W E/M 30 MIN: CPT | Mod: S$GLB,,, | Performed by: PSYCHIATRY & NEUROLOGY

## 2025-05-29 PROCEDURE — 3077F SYST BP >= 140 MM HG: CPT | Mod: CPTII,S$GLB,, | Performed by: PSYCHIATRY & NEUROLOGY

## 2025-05-29 RX ORDER — METHOCARBAMOL 750 MG/1
750 TABLET, FILM COATED ORAL 2 TIMES DAILY PRN
COMMUNITY
Start: 2025-04-22

## 2025-05-29 RX ORDER — NAPROXEN 500 MG/1
500 TABLET ORAL 3 TIMES DAILY
COMMUNITY
Start: 2025-05-01

## 2025-05-29 RX ORDER — DESVENLAFAXINE 50 MG/1
50 TABLET, FILM COATED, EXTENDED RELEASE ORAL NIGHTLY
Qty: 90 TABLET | Refills: 1
Start: 2025-05-29

## 2025-05-29 RX ORDER — CYCLOSPORINE 0.5 MG/ML
1 EMULSION OPHTHALMIC 2 TIMES DAILY
COMMUNITY
Start: 2025-02-19

## 2025-05-30 ENCOUNTER — TELEPHONE (OUTPATIENT)
Dept: PSYCHIATRY | Facility: CLINIC | Age: 80
End: 2025-05-30
Payer: MEDICARE

## 2025-06-02 ENCOUNTER — PATIENT MESSAGE (OUTPATIENT)
Dept: PSYCHIATRY | Facility: CLINIC | Age: 80
End: 2025-06-02
Payer: MEDICARE

## 2025-06-03 ENCOUNTER — PATIENT MESSAGE (OUTPATIENT)
Dept: PSYCHIATRY | Facility: CLINIC | Age: 80
End: 2025-06-03
Payer: MEDICARE

## 2025-06-06 ENCOUNTER — TELEPHONE (OUTPATIENT)
Dept: PSYCHIATRY | Facility: CLINIC | Age: 80
End: 2025-06-06
Payer: MEDICARE

## 2025-06-19 DIAGNOSIS — R11.0 NAUSEA: ICD-10-CM

## 2025-06-19 RX ORDER — ONDANSETRON 4 MG/1
4 TABLET, ORALLY DISINTEGRATING ORAL EVERY 12 HOURS PRN
Qty: 30 TABLET | Refills: 0 | Status: SHIPPED | OUTPATIENT
Start: 2025-06-19

## 2025-06-19 NOTE — TELEPHONE ENCOUNTER
Refill Routing Note   Medication(s) are not appropriate for processing by [facility] for the following reason(s):        Outside of protocol    [facility] action(s):  Route               Appointments  past 12m or future 3m with PCP    Date Provider   Last Visit   10/7/2024 Nayely Garcia MD   Next Visit   Visit date not found Nayely Garcia MD   ED visits in past 90 days: 0        Note composed:11:15 AM 06/19/2025

## 2025-06-19 NOTE — TELEPHONE ENCOUNTER
Copied from CRM #[id number]. Topic: Medications - Medication Refill  >> Jun 19, 2025  9:45 AM Marco wrote:  Requesting an RX refill or new RX.ondansetron (ZOFRAN-ODT) 4 MG TbDL    Is this a refill or new RX: refill     RX name and strength (copy/paste from chart):      Is this a 30 day or 90 day RX:     Pharmacy name and phone #  [facility] [place] [facility] [address] [address]  [address] [address] [address]  Phone: [phone] Fax: [phone]        Who called and call back number:    The doctors have asked that we provide their patients with the following 2 reminders -- prescription refills can take up to 72 hours, and a friendly reminder that in the future you can use your [facility] account to request refills:

## 2025-06-19 NOTE — TELEPHONE ENCOUNTER
No care due was identified.  [facility] Embedded Care Due Messages. Reference number: [id number].   6/19/2025 11:08:43 AM CDT

## 2025-06-20 ENCOUNTER — TELEPHONE (OUTPATIENT)
Dept: INTERNAL MEDICINE | Facility: CLINIC | Age: 80
End: 2025-06-20
Payer: MEDICARE

## 2025-06-20 NOTE — TELEPHONE ENCOUNTER
Copied from CRM #[id number]. Topic: General Inquiry - Return Call  >> Jun 20, 2025  3:00 PM Olga wrote:  Patient is returning a phone call.    Who left a message for the patient: Samantha Au    Does patient know what this is regarding:      Would you like a call back, or a response through your [facility] portal?:       Best call back number:     Comments:

## 2025-06-20 NOTE — TELEPHONE ENCOUNTER
Copied from CRM #[id number]. Topic: General Inquiry - Return Call  >> Jun 20, 2025  9:28 AM Romi wrote:  .1MEDICALADVICE     Patient is calling for Medical Advice regarding: Pt is calling in regards to seeing Dr Crisostomo 2 weeks ago and was told to continue with her desvenlafaxine succinate (PRISTIQ) 50 MG Tb 24, mirtazapine (REMERON) 30 MG tablet and folic acid-vit B6-vit B12 2.5-25-2 mg (NIVA-FOL) 2.5-25-2 mg Tab. Pt stated when she takes the medications (all 3) it makes her feel hot inside, not sleeping at night and she is still anxious. It is not helping her. Pt is asking what does the doctor recommend for her to take?  Please call and advise. Thank you    How long has patient had these symptoms:N/a    Pharmacy name and phone#:N/A    Patient wants a call back or thru [facility], provide patient's call back phone number: [phone] Patient    Comments:    Please advise patient replies from provider may take up to 48 hours.

## 2025-06-20 NOTE — TELEPHONE ENCOUNTER
She can spread out meds -   Take Remeron at night and pristiq in am.  She can take folic acid mid -day   If still not tolerated or anxiety troublesome  she should let Dr Crisostomo know - she can probably message her on portal..

## 2025-06-22 ENCOUNTER — HOSPITAL ENCOUNTER (EMERGENCY)
Facility: HOSPITAL | Age: 80
Discharge: HOME | End: 2025-06-22
Attending: EMERGENCY MEDICINE
Payer: MEDICARE

## 2025-06-22 VITALS
BODY MASS INDEX: 33.32 KG/M2 | SYSTOLIC BLOOD PRESSURE: 180 MMHG | HEIGHT: 65 IN | WEIGHT: 200 LBS | DIASTOLIC BLOOD PRESSURE: 89 MMHG | HEART RATE: 87 BPM | RESPIRATION RATE: 20 BRPM | TEMPERATURE: 98 F | OXYGEN SATURATION: 97 %

## 2025-06-22 DIAGNOSIS — T88.7XXA MEDICATION SIDE EFFECT: Primary | ICD-10-CM

## 2025-06-22 DIAGNOSIS — R20.8: ICD-10-CM

## 2025-06-22 PROCEDURE — 93010 ELECTROCARDIOGRAM REPORT: CPT | Mod: ,,, | Performed by: INTERNAL MEDICINE

## 2025-06-22 PROCEDURE — 99283 EMERGENCY DEPT VISIT LOW MDM: CPT | Mod: 25,ER

## 2025-06-22 PROCEDURE — 93005 ELECTROCARDIOGRAM TRACING: CPT | Mod: ER

## 2025-06-22 NOTE — ED PROVIDER NOTES
Encounter Date: 6/22/2025       History     Chief Complaint   Patient presents with    Anxiety     Patient is feeling anxious. When she takes her medication she has a warm sensation across her upper arms and chest.   Patient began taking Folbic,Mirtazapine and Desvenlafaxine 1 week ago.      79-year-old female presents emergency department complaining of a warm sensation across the top of her chest after taking medicine.  States she recently restarted and anxiety medicine.  She states that for a few hours this afternoon she felt a warmth across the top of her chest.  States it felt like there was a fever inside but only to the area at the top of her chest from her shoulder to her shoulder.  The symptom has resolved at this time.  She denies any pain or shortness of breath.  She denies any exertional component.  Denies any cough or congestion or fever or nausea or vomiting, just the warm sensation.  States this has been going on for a few days.  No other symptoms reported at this time.      Review of patient's allergies indicates:   Allergen Reactions    Ace inhibitors Rash    Lisinopril Rash     Past Medical History:   Diagnosis Date    Abnormal Pap smear 1985, 2000    [facility]    Allergy     Anemia     Anxiety     Degenerative arthritis of shoulder region     Depression     Diabetes mellitus type II     GERD (gastroesophageal reflux disease)     Headache     Hemangioma of liver     Hx of psychiatric care     Hyperlipidemia     Hypertension     Keloid cicatrix     Rachael     Antidepressant-induced.    Obesity     Psychiatric problem     Therapy      Past Surgical History:   Procedure Laterality Date    APPENDECTOMY  1980    CERVICAL BIOPSY  W/ LOOP ELECTRODE EXCISION  1985    CHOLECYSTECTOMY  1980    [facility]  2000    COLONOSCOPY N/A 7/12/2016    Procedure: COLONOSCOPY;  Surgeon: Chito Day MD;  Location: [address];  Service: Endoscopy;  Laterality: N/A;    COLONOSCOPY N/A 8/31/2022    Procedure:  COLONOSCOPY;  Surgeon: Lee More MD;  Location: [facility] ([address] [place] [address]);  Service: Endoscopy;  Laterality: N/A;  fully vaccinated  instructions via mail  clear liquids 4 hr prior-AM Prep-LW    DILATION AND CURETTAGE OF UTERUS  1983    HERNIA REPAIR      LIPOMA RESECTION      TUBAL LIGATION  1985    UMBILICAL HERNIA REPAIR      Wedge resection liver -hemangioma       Family History   Problem Relation Name Age of Onset    Cancer Mother          brain    Dementia Father      Colon cancer Sister sorin 69    Hypertension Sister sorin     Cancer Sister sorin         colon    Cancer Sister juana         metastatic.  esophagus primary?    Diabetes Sister juana     Arthritis Sister juana     Diabetes Brother suad     Alcohol abuse Brother suad     Cirrhosis Brother suad     Diabetes Brother etta     Cancer Brother jeronimo         lung    Heart disease Brother rachel         massive MI    Hypertension Maternal Aunt      Stroke Maternal Aunt      Alcohol abuse Maternal Uncle multiple     Suicide Cousin      Melanoma Neg Hx      Bipolar disorder Neg Hx      Drug abuse Neg Hx      Schizophrenia Neg Hx       Social History[1]  Review of Systems   Constitutional:  Negative for chills and fever.   HENT:  Negative for congestion.    Respiratory:  Negative for cough and shortness of breath.    Cardiovascular:  Negative for chest pain.   Gastrointestinal:  Negative for abdominal pain.   Musculoskeletal:  Negative for back pain.   Neurological:  Negative for headaches.       Physical Exam     Initial Vitals [06/22/25 1539]   BP Pulse Resp Temp SpO2   (!) 175/96 94 20 98 °F (36.7 °C) 95 %      MAP       --         Physical Exam    Nursing note and vitals reviewed.  Constitutional: She appears well-developed and well-nourished. No distress.   HENT:   Head: Normocephalic and atraumatic.   Eyes: Conjunctivae and EOM are normal. Pupils are equal, round, and reactive to light.   Neck: Neck supple. No tracheal  deviation present.   Normal range of motion.  Cardiovascular:  Normal rate and intact distal pulses.           Pulmonary/Chest: No respiratory distress.   Musculoskeletal:         General: No tenderness or edema. Normal range of motion.      Cervical back: Normal range of motion and neck supple.     Neurological: She is alert and oriented to person, place, and time. She has normal strength. No cranial nerve deficit. GCS score is 15. GCS eye subscore is 4. GCS verbal subscore is 5. GCS motor subscore is 6.   Skin: Skin is warm and dry.         ED Course   Procedures  Labs Reviewed - No data to display  EKG Readings: (Independently Interpreted)   Initial Reading: No STEMI. Previous EKG: Compared with most recent EKG Previous EKG Date: 5/10/2023 (Minimal change). Rhythm: Normal Sinus Rhythm. Heart Rate: 85. Ectopy: No Ectopy. ST Segments: Normal ST Segments. Axis: Normal.   EKG independently interpreted by me pending Cardiology review       Imaging Results    None          Medications - No data to display  Medical Decision Making  79-year-old female presents emergency department complaining of warm sensation across the top of her chest      Differential: Flushing, medication side effect, arrhythmia,      Exam and history fairly benign.  Patient is currently asymptomatic with stable vital signs.  Encouraged her to follow up promptly with her primary care physician for further evaluation and management, given strict return precautions.  Vital signs stable, patient and family comfortable with discharge at this time.    Problems Addressed:  Medication side effect: acute illness or injury  Sensation of being warm: acute illness or injury    Amount and/or Complexity of Data Reviewed  External Data Reviewed: ECG and notes.     Details: Reviewed most recent EKG for comparison     Reviewed most recent PCP note documenting baseline medications and past medical history  ECG/medicine tests: ordered and independent interpretation  performed. Decision-making details documented in ED Course.    Risk  OTC drugs.  Prescription drug management.                                      Clinical Impression:  Final diagnoses:  [R20.8] Sensation of being warm  [T88.7XXA] Medication side effect (Primary)          ED Disposition Condition    Discharge Stable          ED Prescriptions    None       Follow-up Information       Follow up With Specialties Details Why Contact Info    Nayely Garcia MD Internal Medicine Schedule an appointment as soon as possible for a visit   [address]  [place] [address]  [phone]                     [1]   Social History  Tobacco Use    Smoking status: Never    Smokeless tobacco: Never   Substance Use Topics    Alcohol use: No    Drug use: No        Rg Winston MD  06/22/25 0292

## 2025-06-23 ENCOUNTER — PATIENT MESSAGE (OUTPATIENT)
Dept: PSYCHIATRY | Facility: CLINIC | Age: 80
End: 2025-06-23
Payer: MEDICARE

## 2025-06-23 LAB
OHS QRS DURATION: 92 MS
OHS QTC CALCULATION: 521 MS

## 2025-06-26 ENCOUNTER — TELEPHONE (OUTPATIENT)
Dept: INTERNAL MEDICINE | Facility: CLINIC | Age: 80
End: 2025-06-26
Payer: MEDICARE

## 2025-06-26 NOTE — TELEPHONE ENCOUNTER
Copied from CRM #[id number]. Topic: General Inquiry - Return Call  >> Jun 24, 2025  8:15 AM Radha wrote:  Patient is returning a phone call.    Who left a message for the patient: Samantha    Does patient know what this is regarding:  yes    Would you like a call back, or a response through your [facility] portal?:   Callback    Best call back number:     Comments:

## 2025-06-27 ENCOUNTER — TELEPHONE (OUTPATIENT)
Dept: INTERNAL MEDICINE | Facility: CLINIC | Age: 80
End: 2025-06-27
Payer: MEDICARE

## 2025-06-27 ENCOUNTER — PATIENT MESSAGE (OUTPATIENT)
Dept: PSYCHIATRY | Facility: CLINIC | Age: 80
End: 2025-06-27
Payer: MEDICARE

## 2025-06-27 NOTE — TELEPHONE ENCOUNTER
Copied from CRM #[id number]. Topic: General Inquiry - Patient Advice  >> Jun 27, 2025  7:06 AM Tasha wrote:  .1MEDICALADVICE     Patient is calling for Medical Advice regarding:Good Morning, Patient has a lot of anxiety . Patient would like please the lorazepam low dose to help    How long has patient had these symptoms:    Pharmacy name and phone#:  [facility] [address] [facility]  [address] [address] [address]  Phone: [phone] Fax: [phone]        Patient wants a call back or thru [facility], provide patient's call back phone number:Call - Patient @ [phone]     Comments:    Please advise patient replies from provider may take up to 48 hours.

## 2025-07-08 ENCOUNTER — OFFICE VISIT (OUTPATIENT)
Dept: PSYCHIATRY | Facility: CLINIC | Age: 80
End: 2025-07-08
Payer: MEDICARE

## 2025-07-08 VITALS
SYSTOLIC BLOOD PRESSURE: 143 MMHG | DIASTOLIC BLOOD PRESSURE: 96 MMHG | BODY MASS INDEX: 32.17 KG/M2 | HEART RATE: 91 BPM | WEIGHT: 193.31 LBS

## 2025-07-08 DIAGNOSIS — F33.42 RECURRENT MAJOR DEPRESSIVE DISORDER, IN FULL REMISSION: Chronic | ICD-10-CM

## 2025-07-08 DIAGNOSIS — F41.1 GENERALIZED ANXIETY DISORDER: Primary | Chronic | ICD-10-CM

## 2025-07-08 PROCEDURE — 3077F SYST BP >= 140 MM HG: CPT | Mod: CPTII,S$GLB,, | Performed by: PSYCHIATRY & NEUROLOGY

## 2025-07-08 PROCEDURE — 1160F RVW MEDS BY RX/DR IN RCRD: CPT | Mod: CPTII,S$GLB,, | Performed by: PSYCHIATRY & NEUROLOGY

## 2025-07-08 PROCEDURE — 99999 PR PBB SHADOW E&M-EST. PATIENT-LVL III: CPT | Mod: PBBFAC,,, | Performed by: PSYCHIATRY & NEUROLOGY

## 2025-07-08 PROCEDURE — 90833 PSYTX W PT W E/M 30 MIN: CPT | Mod: S$GLB,,, | Performed by: PSYCHIATRY & NEUROLOGY

## 2025-07-08 PROCEDURE — 99215 OFFICE O/P EST HI 40 MIN: CPT | Mod: S$GLB,,, | Performed by: PSYCHIATRY & NEUROLOGY

## 2025-07-08 PROCEDURE — 3079F DIAST BP 80-89 MM HG: CPT | Mod: CPTII,S$GLB,, | Performed by: PSYCHIATRY & NEUROLOGY

## 2025-07-08 PROCEDURE — G2211 COMPLEX E/M VISIT ADD ON: HCPCS | Mod: S$GLB,,, | Performed by: PSYCHIATRY & NEUROLOGY

## 2025-07-08 PROCEDURE — 1159F MED LIST DOCD IN RCRD: CPT | Mod: CPTII,S$GLB,, | Performed by: PSYCHIATRY & NEUROLOGY

## 2025-07-08 RX ORDER — MIRTAZAPINE 30 MG/1
30 TABLET, FILM COATED ORAL NIGHTLY
Qty: 90 TABLET | Refills: 3 | Status: SHIPPED | OUTPATIENT
Start: 2025-07-08
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
## 2025-07-08 NOTE — PATIENT INSTRUCTIONS
ADVICE & COUNSELING:   - In cases of emergencies (e.g. SI/HI resulting in danger to self or others, functioning deteriorating to the level of grave disability), call 911 or [phone], or present to the emergency department for immediate assistance.   - Individuals should not operate a motor vehicle or heavy machinery if effects of medications or underlying symptoms/condition impair the ability to do so safely.   - FULLY comply with ANY/ALL medication as prescribed/instructed and report ANY/ALL suspected adverse effects to appropriate health care providers.    SLEEP HYGIENE:    1. No caffeine after 5 - 6 hours before bedtime.    2. Establish a set sleeping schedule. Go to bed at same time every night and get up at same time every day.      3. No activities other that sleeping (or sex) in the bed/bedroom.     -No watching television in bed.     -No playing with IPad/telephone/computer in bed.     -No reading in bed   -No eating food in bed   -No work in bed    4. Bed/bedroom should be dark and quiet.    PSYCHOTHERAPY REFERRAL/CONSULT  [facility] Referral:   call [phone] to schedule appt.   patient is aware that wait list is long and appointments may not be available for 6 months or more  Non-[facility] Therapist:   resource website: Psychology Today: Health, Help, Happiness + Find a Therapist   contact insurance carrier for therapists covered under their plan (call or website).  .

## 2025-07-15 ENCOUNTER — TELEPHONE (OUTPATIENT)
Dept: PSYCHIATRY | Facility: CLINIC | Age: 80
End: 2025-07-15
Payer: MEDICARE

## 2025-07-16 ENCOUNTER — TELEPHONE (OUTPATIENT)
Dept: PSYCHIATRY | Facility: CLINIC | Age: 80
End: 2025-07-16
Payer: MEDICARE

## 2025-07-16 NOTE — TELEPHONE ENCOUNTER
Returned call to patient.  Patient states that nausea is intermittent and admits that it was present independent of remeron.  Patient vocalizes suspicion that anxiety is source of nausea.  Daughter is encouraging patient to continue medication.  MD explained that remeron side effects include increase in appetite, not decrease.  Patient clarifed during phone call that she has not decreased po intake.    Patient states that she plans to continue taking medication.    - continue meds  - f/u as scheduled.    ----- Message from ISA Lockett sent at 7/15/2025  4:46 PM CDT -----  Pt states the Remeron is not helping her sleep and is making her very nauseated, not wanting to eat. States she wants to stop taking it. Please advise.

## 2025-07-21 ENCOUNTER — HOSPITAL ENCOUNTER (EMERGENCY)
Facility: HOSPITAL | Age: 80
Discharge: HOME | End: 2025-07-21
Attending: STUDENT IN AN ORGANIZED HEALTH CARE EDUCATION/TRAINING PROGRAM
Payer: MEDICARE

## 2025-07-21 VITALS
HEIGHT: 65 IN | TEMPERATURE: 98 F | SYSTOLIC BLOOD PRESSURE: 155 MMHG | BODY MASS INDEX: 31.65 KG/M2 | DIASTOLIC BLOOD PRESSURE: 78 MMHG | OXYGEN SATURATION: 98 % | HEART RATE: 66 BPM | RESPIRATION RATE: 18 BRPM | WEIGHT: 190 LBS

## 2025-07-21 DIAGNOSIS — R63.0 POOR APPETITE: ICD-10-CM

## 2025-07-21 DIAGNOSIS — F41.1 GENERALIZED ANXIETY DISORDER: Primary | ICD-10-CM

## 2025-07-21 DIAGNOSIS — R45.89 TEARFULNESS: ICD-10-CM

## 2025-07-21 DIAGNOSIS — F41.9 ANXIETY: Primary | ICD-10-CM

## 2025-07-21 DIAGNOSIS — Z72.820 POOR SLEEP: ICD-10-CM

## 2025-07-21 LAB
ABSOLUTE EOSINOPHIL (OHS): 0.05 K/UL
ABSOLUTE MONOCYTE (OHS): 0.62 K/UL (ref 0.3–1)
ABSOLUTE NEUTROPHIL COUNT (OHS): 3.92 K/UL (ref 1.8–7.7)
ALBUMIN SERPL BCP-MCNC: 3.9 G/DL (ref 3.5–5.2)
ALP SERPL-CCNC: 77 UNIT/L (ref 40–150)
ALT SERPL W/O P-5'-P-CCNC: 17 UNIT/L (ref 10–44)
AMPHET UR QL SCN: NEGATIVE
ANION GAP (OHS): 9 MMOL/L (ref 8–16)
APAP SERPL-MCNC: <3 UG/ML (ref 10–20)
AST SERPL-CCNC: 22 UNIT/L (ref 11–45)
BACTERIA #/AREA URNS AUTO: NORMAL /HPF
BARBITURATE SCN PRESENT UR: NEGATIVE
BASOPHILS # BLD AUTO: 0.04 K/UL
BASOPHILS NFR BLD AUTO: 0.6 %
BENZODIAZ UR QL SCN: NEGATIVE
BILIRUB SERPL-MCNC: 0.6 MG/DL (ref 0.1–1)
BILIRUB UR QL STRIP.AUTO: NEGATIVE
BUN SERPL-MCNC: 7 MG/DL (ref 8–23)
CALCIUM SERPL-MCNC: 9.4 MG/DL (ref 8.7–10.5)
CANNABINOIDS UR QL SCN: NEGATIVE
CHLORIDE SERPL-SCNC: 106 MMOL/L (ref 95–110)
CLARITY UR: CLEAR
CO2 SERPL-SCNC: 27 MMOL/L (ref 23–29)
COCAINE UR QL SCN: NEGATIVE
COLOR UR AUTO: COLORLESS
CREAT SERPL-MCNC: 0.9 MG/DL (ref 0.5–1.4)
CREAT UR-MCNC: 61 MG/DL (ref 15–325)
ERYTHROCYTE [DISTWIDTH] IN BLOOD BY AUTOMATED COUNT: 17.3 % (ref 11.5–14.5)
GFR SERPLBLD CREATININE-BSD FMLA CKD-EPI: >60 ML/MIN/1.73/M2
GLUCOSE SERPL-MCNC: 110 MG/DL (ref 70–110)
GLUCOSE UR QL STRIP: NEGATIVE
HCT VFR BLD AUTO: 37.1 % (ref 37–48.5)
HGB BLD-MCNC: 12.6 GM/DL (ref 12–16)
HGB UR QL STRIP: NEGATIVE
HOLD SPECIMEN: NORMAL
IMM GRANULOCYTES # BLD AUTO: 0.01 K/UL (ref 0–0.04)
IMM GRANULOCYTES NFR BLD AUTO: 0.2 % (ref 0–0.5)
KETONES UR QL STRIP: NEGATIVE
LEUKOCYTE ESTERASE UR QL STRIP: ABNORMAL
LYMPHOCYTES # BLD AUTO: 1.55 K/UL (ref 1–4.8)
MAGNESIUM SERPL-MCNC: 1.8 MG/DL (ref 1.6–2.6)
MCH RBC QN AUTO: 29.3 PG (ref 27–31)
MCHC RBC AUTO-ENTMCNC: 34 G/DL (ref 32–36)
MCV RBC AUTO: 86 FL (ref 82–98)
METHADONE UR QL SCN: NEGATIVE
MICROSCOPIC COMMENT: NORMAL
NITRITE UR QL STRIP: NEGATIVE
NUCLEATED RBC (/100WBC) (OHS): 0 /100 WBC
OHS QRS DURATION: 94 MS
OHS QTC CALCULATION: 487 MS
OPIATES UR QL SCN: NEGATIVE
PCP UR QL: NEGATIVE
PH UR STRIP: 8 [PH]
PHOSPHATE SERPL-MCNC: 2.1 MG/DL (ref 2.7–4.5)
PLATELET # BLD AUTO: 307 K/UL (ref 150–450)
PMV BLD AUTO: 10.3 FL (ref 9.2–12.9)
POTASSIUM SERPL-SCNC: 3.3 MMOL/L (ref 3.5–5.1)
PROT SERPL-MCNC: 7.6 GM/DL (ref 6–8.4)
PROT UR QL STRIP: NEGATIVE
RBC # BLD AUTO: 4.3 M/UL (ref 4–5.4)
RBC #/AREA URNS AUTO: 1 /HPF (ref 0–4)
RELATIVE EOSINOPHIL (OHS): 0.8 %
RELATIVE LYMPHOCYTE (OHS): 25 % (ref 18–48)
RELATIVE MONOCYTE (OHS): 10 % (ref 4–15)
RELATIVE NEUTROPHIL (OHS): 63.4 % (ref 38–73)
SALICYLATES SERPL-MCNC: <5 MG/DL (ref 15–30)
SODIUM SERPL-SCNC: 142 MMOL/L (ref 136–145)
SP GR UR STRIP: 1.01
SQUAMOUS #/AREA URNS AUTO: 3 /HPF
TSH SERPL-ACNC: 0.95 UIU/ML (ref 0.4–4)
UROBILINOGEN UR STRIP-ACNC: NEGATIVE EU/DL
WBC # BLD AUTO: 6.19 K/UL (ref 3.9–12.7)
WBC #/AREA URNS AUTO: 4 /HPF (ref 0–5)

## 2025-07-21 PROCEDURE — 84100 ASSAY OF PHOSPHORUS: CPT

## 2025-07-21 PROCEDURE — 93010 ELECTROCARDIOGRAM REPORT: CPT | Mod: ,,, | Performed by: INTERNAL MEDICINE

## 2025-07-21 PROCEDURE — 80307 DRUG TEST PRSMV CHEM ANLYZR: CPT

## 2025-07-21 PROCEDURE — 80179 DRUG ASSAY SALICYLATE: CPT

## 2025-07-21 PROCEDURE — 83735 ASSAY OF MAGNESIUM: CPT

## 2025-07-21 PROCEDURE — 80143 DRUG ASSAY ACETAMINOPHEN: CPT

## 2025-07-21 PROCEDURE — 84443 ASSAY THYROID STIM HORMONE: CPT

## 2025-07-21 PROCEDURE — 25000003 PHARM REV CODE 250

## 2025-07-21 PROCEDURE — 85025 COMPLETE CBC W/AUTO DIFF WBC: CPT

## 2025-07-21 PROCEDURE — 93005 ELECTROCARDIOGRAM TRACING: CPT

## 2025-07-21 PROCEDURE — 99284 EMERGENCY DEPT VISIT MOD MDM: CPT | Mod: 25

## 2025-07-21 PROCEDURE — 80053 COMPREHEN METABOLIC PANEL: CPT

## 2025-07-21 PROCEDURE — 81001 URINALYSIS AUTO W/SCOPE: CPT

## 2025-07-21 RX ORDER — SODIUM,POTASSIUM PHOSPHATES 280-250MG
1 POWDER IN PACKET (EA) ORAL
Status: COMPLETED | OUTPATIENT
Start: 2025-07-21 | End: 2025-07-21

## 2025-07-21 RX ORDER — HYDROXYZINE HYDROCHLORIDE 10 MG/1
TABLET, FILM COATED ORAL
Qty: 120 TABLET | Refills: 1 | Status: SHIPPED | OUTPATIENT
Start: 2025-07-21

## 2025-07-21 RX ADMIN — POTASSIUM & SODIUM PHOSPHATES POWDER PACK 280-160-250 MG 1 PACKET: 280-160-250 PACK at 09:07
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
## 2025-07-21 NOTE — ED PROVIDER NOTES
Encounter Date: 7/21/2025       History     Chief Complaint   Patient presents with    Anxiety     Pt arrive to ED with a complaint of anxiety, insomnia,loss of appetite.Pt states that she starting mirtazapine 30 mg at night 2 wks ago,Pt denies any other symptoms.     HPI  Caprice Gutierrez is a 79 y.o. female, past medical history anxiety, depression, adjustment disorder, presenting with complaints of anxiety, tearfulness, poor appetite, insomnia.  Patient states that her psychiatrist recently retired and she has established care with someone new.  She states that she previously was taking her medications very inconsistently.  Since establishing care with her new psychiatrist she now takes Remeron 30 nightly at the same time every night for the past 2 weeks.  She says despite doing this she does not feel like her symptoms have gotten any better and feels particularly bothered by her lack of sleep.  She denies any missed doses of medications.  Patient denies any SI, HI.    Review of patient's allergies indicates:   Allergen Reactions    Ace inhibitors Rash    Lisinopril Rash     Past Medical History:   Diagnosis Date    Abnormal Pap smear 1985, 2000    [facility]    Allergy     Anemia     Anxiety     Degenerative arthritis of shoulder region     Depression     Diabetes mellitus type II     GERD (gastroesophageal reflux disease)     Headache     Hemangioma of liver     Hx of psychiatric care     Hyperlipidemia     Hypertension     Keloid cicatrix     Rachael     Antidepressant-induced.    Obesity     Psychiatric problem     Therapy      Past Surgical History:   Procedure Laterality Date    APPENDECTOMY  1980    CERVICAL BIOPSY  W/ LOOP ELECTRODE EXCISION  1985    CHOLECYSTECTOMY  1980    [facility]  2000    COLONOSCOPY N/A 7/12/2016    Procedure: COLONOSCOPY;  Surgeon: Chito Day MD;  Location: [address];  Service: Endoscopy;  Laterality: N/A;    COLONOSCOPY N/A 8/31/2022    Procedure: COLONOSCOPY;  Surgeon:  Lee More MD;  Location: [facility] (4TH FLR);  Service: Endoscopy;  Laterality: N/A;  fully vaccinated  instructions via mail  clear liquids 4 hr prior-AM Prep-LW    DILATION AND CURETTAGE OF UTERUS  1983    HERNIA REPAIR      LIPOMA RESECTION      TUBAL LIGATION  1985    UMBILICAL HERNIA REPAIR      Wedge resection liver -hemangioma       Family History   Problem Relation Name Age of Onset    Cancer Mother          brain    Dementia Father      Colon cancer Sister sorin 69    Hypertension Sister sorin     Cancer Sister sorin         colon    Cancer Sister juana         metastatic.  esophagus primary?    Diabetes Sister juana     Arthritis Sister juana     Diabetes Brother suad     Alcohol abuse Brother suad     Cirrhosis Brother suad     Diabetes Brother etta     Cancer Brother jeronimo         lung    Heart disease Brother rachel         massive MI    Hypertension Maternal Aunt      Stroke Maternal Aunt      Alcohol abuse Maternal Uncle multiple     Suicide Cousin      Melanoma Neg Hx      Bipolar disorder Neg Hx      Drug abuse Neg Hx      Schizophrenia Neg Hx       Social History[1]  Review of Systems    Physical Exam     Initial Vitals [07/21/25 0602]   BP Pulse Resp Temp SpO2   (!) 177/90 77 20 97.7 °F (36.5 °C) 100 %      MAP       --         Physical Exam    Nursing note and vitals reviewed.  Constitutional: She appears well-developed and well-nourished. She is not diaphoretic. She is active. No distress.   Cardiovascular:  Normal rate and regular rhythm.     Exam reveals no gallop and no friction rub.       No murmur heard.  Pulmonary/Chest: No accessory muscle usage. No respiratory distress. She has no decreased breath sounds. She has no wheezes.   Abdominal: She exhibits no shifting dullness, no distension and no fluid wave. There is no abdominal tenderness. There is no rebound and no guarding.     Neurological: She is alert and oriented to person, place, and time. GCS score  is 15. GCS eye subscore is 4. GCS verbal subscore is 5. GCS motor subscore is 6.   Skin: Skin is warm and dry. Capillary refill takes less than 2 seconds.   Psychiatric: Her mood appears not anxious. Her affect is not labile. Her speech is not rapid and/or pressured, not tangential and not slurred. She is not agitated, not aggressive, not hyperactive, not withdrawn, not actively hallucinating and not combative. Thought content is not paranoid and not delusional. Cognition and memory are not impaired. She does not express inappropriate judgment. She expresses no homicidal and no suicidal ideation. She expresses no suicidal plans and no homicidal plans. She is communicative. She exhibits normal recent memory and normal remote memory.   Patient is tearful on exam She is attentive.         ED Course   Procedures  Labs Reviewed   COMPREHENSIVE METABOLIC PANEL - Abnormal       Result Value    Sodium 142      Potassium 3.3 (*)     Chloride 106      CO2 27      Glucose 110      BUN 7 (*)     Creatinine 0.9      Calcium 9.4      Protein Total 7.6      Albumin 3.9      Bilirubin Total 0.6      ALP 77      AST 22      ALT 17      Anion Gap 9      eGFR >60     PHOSPHORUS - Abnormal    Phosphorus Level 2.1 (*)    URINALYSIS, REFLEX TO URINE CULTURE - Abnormal    Color, UA Colorless (*)     Appearance, UA Clear      pH, UA 8.0      Spec Grav UA 1.010      Protein, UA Negative      Glucose, UA Negative      Ketones, UA Negative      Bilirubin, UA Negative      Blood, UA Negative      Nitrites, UA Negative      Urobilinogen, UA Negative      Leukocyte Esterase, UA 1+ (*)    ACETAMINOPHEN LEVEL - Abnormal    Acetaminophen Level <3.0 (*)    SALICYLATE LEVEL - Abnormal    Salicylate Level <5.0 (*)    CBC WITH DIFFERENTIAL - Abnormal    WBC 6.19      RBC 4.30      HGB 12.6      HCT 37.1      MCV 86      MCH 29.3      MCHC 34.0      RDW 17.3 (*)     Platelet Count 307      MPV 10.3      Nucleated RBC 0      Neut % 63.4      Lymph % 25.0  "     Mono % 10.0      Eos % 0.8      Basophil % 0.6      Imm Grans % 0.2      Neut # 3.92      Lymph # 1.55      Mono # 0.62      Eos # 0.05      Baso # 0.04      Imm Grans # 0.01     MAGNESIUM - Normal    Magnesium  1.8     TSH - Normal    TSH 0.948     DRUG SCREEN PANEL, URINE EMERGENCY - Normal    Benzodiazepine, Urine Negative      Methadone, Urine Negative      Cocaine, Urine Negative      Opiates, Urine Negative      Barbiturates, Urine Negative      Amphetamines, Urine Negative      THC Negative      Phencyclidine, Urine Negative      Urine Creatinine 61.0      Narrative:     This screen includes the following classes of drugs at the listed cut-off:     Benzodiazepines:        200 ng/ml   Methadone:              300 ng/ml   Cocaine metabolite:     300 ng/ml   Opiates:                300 ng/ml   Barbiturates:           200 ng/ml   Amphetamines:           1000 ng/ml   Marijuana metabs (THC): 50 ng/ml   Phencyclidine (PCP):    25 ng/ml     This is a screening test. If results do not correlate with clinical presentation, then a confirmatory send out test is advised.    This report is intended for use in clinical monitoring and management of patients. It is not intended for use in employment related drug testing."   CBC W/ AUTO DIFFERENTIAL    Narrative:     The following orders were created for panel order CBC auto differential.  Procedure                               Abnormality         Status                     ---------                               -----------         ------                     CBC with Differential[[id number]]       Abnormal            Final result                 Please view results for these tests on the individual orders.   EXTRA TUBES    Narrative:     The following orders were created for panel order EXTRA TUBES.  Procedure                               Abnormality         Status                     ---------                               -----------         ------                   "   Light Green Top Hold[[id number]]                            Final result               Lavender Top Hold[[id number]]                               Final result               Gold Top Hold[[id number]]                                   Final result               Gold Top Hold[[id number]]                                   Final result                 Please view results for these tests on the individual orders.   LIGHT GREEN TOP HOLD    Extra Tube Hold for add-ons.     LAVENDER TOP HOLD    Extra Tube Hold for add-ons.     GOLD TOP HOLD    Extra Tube Hold for add-ons.     GOLD TOP HOLD    Extra Tube Hold for add-ons.     URINALYSIS MICROSCOPIC    RBC, UA 1      WBC, UA 4      Bacteria, UA Rare      Squamous Epithelial Cells, UA 3      Microscopic Comment       GREY TOP URINE HOLD     EKG Readings: (Independently Interpreted)   Normal sinus rhythm.  Rate 85.  .  .  No ST elevations or depressions that would be concerning for STEMI.  She does have artifact in V5 may lead to, and not seen on most prior.  05/10/2023 which showed normal sinus rhythm and overall similar appearance.     ECG Results              EKG 12-lead (Final result)        Collection Time Result Time QRS Duration [facility] QTC Calculation    07/21/25 07:29:20 07/21/25 11:26:40 94 487                     Final result by Interface, Lab In [facility] (07/21/25 11:26:45)                   Narrative:    Test Reason : F41.9,    Vent. Rate :  85 BPM     Atrial Rate :  85 BPM     P-R Int : 134 ms          QRS Dur :  94 ms      QT Int : 410 ms       P-R-T Axes :  68  -5  73 degrees    QTcB Int : 487 ms    Normal sinus rhythm  Moderate voltage criteria for LVH, may be normal variant ( R in aVL ,  [employer] product )  Nonspecific T wave abnormality  Abnormal ECG  When compared with ECG of 22-Jun-2025 15:51,  Nonspecific ST and/or T wave abnormalities More prominent than previously  Confirmed by Eamon Casarez ([id number]) on 7/21/2025 11:26:35 AM    Referred By:  AAAREFERRAL SELF           Confirmed By: Eamon Casarez                                  Imaging Results    None          Medications   potassium, sodium phosphates 280-160-250 mg packet 1 packet (1 packet Oral Given 7/21/25 0914)     Medical Decision Making  Caprice Gutierrez  is a 79 y.o. female, presenting with complaints of anxiety, depression, history of present illness obtained from pt  as seen above. Patient able to provide adequate and detailed history of present illness. Patient found to be tearful on exam, stable. Exam notable as above.     DDX includes but is not limited to:  Anxiety, depression, SI, UTI, ACS.    Patient reports no improvement in previous anxiety/depression after transitioning to Remeron alone 30 mg q.h.s..  I discussed his case with psychiatry who agreed to evaluate and provide recommendations for this patient.  She was able to be seen by her home psychiatrist during this ED stay who recommending starting Atarax PRN.  Her psychiatrist as prescribed this medication.  I discussed with the patient who reports understanding and agreement with the plan for care and feels comfortable starting this medication as instructed.  Patient was instructed to follow-up with her psychiatrist outpatient and reports agreement.    See ED course for additional discussion. Data Reviewed/Counseling: I have reviewed the patient's vital signs, nursing notes, and other relevant tests/information. Any incidental findings were discussed with the patient. I had a detailed discussion with the patient regarding the historical points, exam findings, and any diagnostic results supporting the diagnosis.       Disposition: Discharged      Amount and/or Complexity of Data Reviewed  Labs: ordered. Decision-making details documented in ED Course.    Risk  OTC drugs.              Attending Attestation:   Physician Attestation Statement for Resident:  As the supervising MD   Physician Attestation Statement: I have  personally seen and examined this patient.   I agree with the above history.  -:   As the supervising MD I agree with the above PE.     As the supervising MD I agree with the above treatment, course, plan, and disposition.   -: See my additional documentation in the ED course.                    ED Course as of 07/21/25 1615   Mon Jul 21, 2025   0736 EKG with sinus rhythm, rate 85, no STEMI [NN]   0757 The patient is a 79-year-old female with history of GERD, hyperlipidemia, CAD, anxiety, depression here for anxiety.  Patient states that she has been feeling more anxious than usual over the last 2 weeks.  She recently saw psychiatry 2 weeks ago.  She was previously on Pristiq and Remeron but was taking them inconsistently.  She stopped taking the Pristiq back of the end of May.  Two weeks ago, her psychiatrist started her on scheduled 2 Remeron nightly.  She states that since starting the Remeron she has felt more anxious, she has had insomnia and she has had decreased appetite.  She denies any chest pain, shortness of breath, palpitations, abdominal pain, nausea, vomiting, fever, chills, urinary symptoms.  She states that even though she has decreased appetite, she has still been making herself eat scheduled meals.  She denies HI, SI, AVH. [NN]   0759 On exam, patient resting comfortably.  Vitals reassuring.  Normal rate, regular rhythm.  Lungs clear.  Abdomen is soft, nontender, nondistended.  She appears well hydrated on exam. [NN]   0759 Discussed with psychiatry who will reach out to the patient's psychiatrist as see if we can change some of her medications.  She does not meet pec criteria at this time.  Will check labs to evaluate electrolytes given decreased appetite, check TSH to evaluate for other causes that could be contributing to her symptoms.  Overall, patient very well-appearing on exam. [NN]   0844 Squam Epithel, UA: 3 [HB]   0844 Leukocyte Esterase, UA(!): 1+ [HB]   0844 WBC, UA: 4 [HB]   0844 RBC,  UA: 1 [HB]   0844 Bacteria, UA: Rare  Will wait for urine culture, unlikely to be UTI [HB]   0845 Potassium(!): 3.3 [HB]   0845 Phosphorus Level(!): 2.1 [HB]      ED Course User Index  [HB] July Ponce MD  [NN] Krystyna Gentile MD                               Clinical Impression:  Final diagnoses:  [F41.9] Anxiety (Primary)  [R63.0] Poor appetite  [Z72.820] Poor sleep  [R45.89] Tearfulness          ED Disposition Condition    Discharge Stable          ED Prescriptions    None       Follow-up Information    None              July Ponce MD  Resident  07/21/25 1615         [1]   Social History  Tobacco Use    Smoking status: Never     Passive exposure: Never    Smokeless tobacco: Never   Substance Use Topics    Alcohol use: No    Drug use: No        Krystyna Gentile MD  07/21/25 0324

## 2025-07-21 NOTE — ED TRIAGE NOTES
Caprice Gutierrez, a 79 y.o. female presents to the ED w/ complaint of anxiety. Pt reports being prescribed mirtazapine, and it is making her anxiety worse. Pt endorses anxiety, insomnia, and loss of appetite. Hx of anxiety/depression.

## 2025-07-21 NOTE — DISCHARGE INSTRUCTIONS
Diagnosis: Anxiety    Follow-Up Plan:  - Follow-up with your Psychiatrist.   - Your regular Psychiatrist has started a new medication called Hydroxyzine, please take this as prescribed until you can see them again in clinic.   - If you begin having thoughts of hurting yourself, please return to the immediately ED for evaluation.     Return to the Emergency Department for symptoms including but not limited to: worsening symptoms, shortness of breath or chest pain, vomiting with inability to hold down fluids, fevers greater than 100.4°F, passing out/fainting/unconsciousness, or other concerning symptoms.    We would like to thank you for coming today and our hope is that we served you and your family well during your stay

## 2025-07-22 LAB — HOLD SPECIMEN: NORMAL

## 2025-07-31 ENCOUNTER — TELEPHONE (OUTPATIENT)
Dept: INTERNAL MEDICINE | Facility: CLINIC | Age: 80
End: 2025-07-31
Payer: MEDICARE

## 2025-07-31 DIAGNOSIS — G47.00 INSOMNIA, UNSPECIFIED TYPE: ICD-10-CM

## 2025-07-31 DIAGNOSIS — F32.A DEPRESSION, UNSPECIFIED DEPRESSION TYPE: ICD-10-CM

## 2025-07-31 DIAGNOSIS — F41.9 ANXIETY: ICD-10-CM

## 2025-07-31 RX ORDER — LORAZEPAM 1 MG/1
TABLET ORAL
Qty: 30 TABLET | Refills: 0 | Status: SHIPPED | OUTPATIENT
Start: 2025-07-31

## 2025-07-31 NOTE — TELEPHONE ENCOUNTER
Copied from CRM #[id number]. Topic: Medications - Medication Refill  >> Jul 31, 2025  7:12 AM Caroline wrote:  Type:  RX Refill Request    Who Called: Patient   Refill or New Rx:new  RX Name and Strength:LORazepam (ATIVAN) 1 MG tablet  How is the patient currently taking it? (ex. 1XDay):  Is this a 30 day or 90 day RX:N/A  Preferred Pharmacy with phone number:  [facility] - [facility] [address] [address]  [address] [address] [address]  Phone: [phone] Fax: [phone]: Not open 24 hours    Local or Mail Order:local   Ordering Provider:[facility] - Emergency Dept  Would the patient rather a call back or a response via [facility]? Call back   Best Call Back Number:[phone]  Additional Information: Pt states she will not see the therapist til 08/07 in [place]. Pt states she will not be seeing Dr Crisostomo any longer. Pt also states she is not able to take the mirtazapine (REMERON) 30 MG tablet any longer due to it being too strong. Pt states she was seen in the ER and prescribed the medication above.

## 2025-07-31 NOTE — TELEPHONE ENCOUNTER
Refill Routing Note   Medication(s) are not appropriate for processing by [facility] for the following reason(s):        Outside of protocol  No active prescription written by provider-received in ED      [facility] action(s):  Route   Requires appointment : Yes     Requires labs : Yes      Medication Therapy Plan: Pt seems to be in distress. Pt has lost her psych provider and went to ED      Appointments  past 12m or future 3m with PCP    Date Provider   Last Visit   10/7/2024 Nayely Garcia MD   Next Visit   Visit date not found Nayely Garcia MD   ED visits in past 90 days: 3        Note composed:12:02 PM 07/31/2025

## 2025-07-31 NOTE — TELEPHONE ENCOUNTER
Copied from CRM #[id number]. Topic: Medications - Medication Refill  >> Jul 31, 2025  1:05 PM Chivo wrote:  Type:  RX Refill Request    Who Called: Refill   Refill or New Rx:Refill   RX Name and Strength:LORazepam (ATIVAN) 1 MG tablet  How is the patient currently taking it? (ex. 1XDay):1 every 8 hors   Is this a 30 day or 90 day RX:n/a  Preferred Pharmacy with phone number:  [facility] - [facility] [address] [address] [address]  [address] [address] [address]  Phone: [phone] Fax: [phone]    Local or Mail Order:local   Ordering Provider:Jennifer   Would the patient rather a call back or a response via [facility]? Call back   Best Call Back Number:[phone]  Additional Information: Pt is out of meds and needs a call back today if she will get her meds

## 2025-07-31 NOTE — TELEPHONE ENCOUNTER
Care Due:                  Date            Visit Type   Department     Provider  --------------------------------------------------------------------------------                                EP -                              PRIMARY      [facility]  Last Visit: 10-      CARE ([facility])   MEDICINE       Nayely Garcia  Next Visit: None Scheduled  None         None Found                                                            Last  Test          Frequency    Reason                     Performed    Due Date  --------------------------------------------------------------------------------    Office Visit  12 months..  ergocalciferol...........  10-   10-    Vitamin D...  12 months..  ergocalciferol...........  07- 07-    [employer] Embedded Care Due Messages. Reference number: [id number].   7/31/2025 9:15:32 AM CDT

## 2025-07-31 NOTE — TELEPHONE ENCOUNTER
Copied from CRM #[id number]. Topic: Medications - Medication Status Check   >> Jul 31, 2025 11:27 AM Cameron wrote:  Name of Who is Calling: pt        What is the request in detail: calling to check the status of refill request pt is out of Rx LORazepam (ATIVAN) 1 MG tablet        Can the clinic reply by [facility]: no        What Number to Call Back if not in [facility]:[phone]

## 2025-08-01 ENCOUNTER — TELEPHONE (OUTPATIENT)
Dept: INTERNAL MEDICINE | Facility: CLINIC | Age: 80
End: 2025-08-01
Payer: MEDICARE

## 2025-08-01 NOTE — TELEPHONE ENCOUNTER
Copied from CRM #[id number]. Topic: General Inquiry - Return Call  >> Aug 1, 2025  2:42 PM Caroline wrote:  Type:  Patient Returning Call    Who Called:Patient   Who Left Message for Patient:Solange Mckinney MA  Does the patient know what this is regarding?:Called patient and lvm  Would the patient rather a call back or a response via [facility]? Call back   Best Call Back Number:[phone]  Additional Information: N/A

## 2025-08-01 NOTE — TELEPHONE ENCOUNTER
Copied from CRM #[id number]. Topic: Medications - Medication Status Check   >> Aug 1, 2025  8:14 AM Kaycee wrote:  Patient put in an order for her LORazepam (ATIVAN) 1 MG tablet on July thirty first, and she would like to know what is the status of her medication? Patient said that she is out of medication.

## 2025-08-15 ENCOUNTER — HOSPITAL ENCOUNTER (EMERGENCY)
Facility: HOSPITAL | Age: 80
Discharge: HOME | End: 2025-08-15
Attending: FAMILY MEDICINE
Payer: MEDICARE

## 2025-08-15 VITALS
HEART RATE: 80 BPM | WEIGHT: 190 LBS | DIASTOLIC BLOOD PRESSURE: 84 MMHG | SYSTOLIC BLOOD PRESSURE: 123 MMHG | TEMPERATURE: 99 F | OXYGEN SATURATION: 99 % | RESPIRATION RATE: 19 BRPM | HEIGHT: 65 IN | BODY MASS INDEX: 31.65 KG/M2

## 2025-08-15 DIAGNOSIS — R51.9 ACUTE NONINTRACTABLE HEADACHE, UNSPECIFIED HEADACHE TYPE: Primary | ICD-10-CM

## 2025-08-15 DIAGNOSIS — R11.0 NAUSEA: ICD-10-CM

## 2025-08-15 LAB
ABSOLUTE EOSINOPHIL (OHS): 0.04 K/UL
ABSOLUTE MONOCYTE (OHS): 0.84 K/UL (ref 0.3–1)
ABSOLUTE NEUTROPHIL COUNT (OHS): 4.51 K/UL (ref 1.8–7.7)
ALBUMIN SERPL BCP-MCNC: 4.2 G/DL (ref 3.5–5.2)
ALP SERPL-CCNC: 77 UNIT/L (ref 38–126)
ALT SERPL W/O P-5'-P-CCNC: 20 UNIT/L (ref 10–44)
ANION GAP (OHS): 5 MMOL/L (ref 8–16)
AST SERPL-CCNC: 32 UNIT/L (ref 15–46)
BACTERIA #/AREA URNS HPF: ABNORMAL /HPF
BASOPHILS # BLD AUTO: 0.03 K/UL
BASOPHILS NFR BLD AUTO: 0.4 %
BILIRUB SERPL-MCNC: 0.7 MG/DL (ref 0.1–1)
BILIRUB UR QL STRIP.AUTO: NEGATIVE
BUN SERPL-MCNC: 10 MG/DL (ref 7–17)
CALCIUM SERPL-MCNC: 9.5 MG/DL (ref 8.7–10.5)
CHLORIDE SERPL-SCNC: 100 MMOL/L (ref 95–110)
CLARITY UR: CLEAR
CO2 SERPL-SCNC: 31 MMOL/L (ref 23–29)
COLOR UR AUTO: YELLOW
CREAT SERPL-MCNC: 0.8 MG/DL (ref 0.5–1.4)
ERYTHROCYTE [DISTWIDTH] IN BLOOD BY AUTOMATED COUNT: 15.9 % (ref 11.5–14.5)
GFR SERPLBLD CREATININE-BSD FMLA CKD-EPI: >60 ML/MIN/1.73/M2
GLUCOSE SERPL-MCNC: 119 MG/DL (ref 70–110)
GLUCOSE UR QL STRIP: NEGATIVE
HCT VFR BLD AUTO: 36.8 % (ref 37–48.5)
HGB BLD-MCNC: 12.7 GM/DL (ref 12–16)
HGB UR QL STRIP: NEGATIVE
IMM GRANULOCYTES # BLD AUTO: 0.02 K/UL (ref 0–0.04)
IMM GRANULOCYTES NFR BLD AUTO: 0.3 % (ref 0–0.5)
KETONES UR QL STRIP: NEGATIVE
LEUKOCYTE ESTERASE UR QL STRIP: ABNORMAL
LYMPHOCYTES # BLD AUTO: 1.56 K/UL (ref 1–4.8)
MCH RBC QN AUTO: 29.4 PG (ref 27–31)
MCHC RBC AUTO-ENTMCNC: 34.5 G/DL (ref 32–36)
MCV RBC AUTO: 85 FL (ref 82–98)
MICROSCOPIC COMMENT: ABNORMAL
NITRITE UR QL STRIP: NEGATIVE
NT-PROBNP SERPL-MCNC: 38 PG/ML (ref 5–1800)
NUCLEATED RBC (/100WBC) (OHS): 0 /100 WBC
PH UR STRIP: 6 [PH]
PLATELET # BLD AUTO: 340 K/UL (ref 150–450)
PMV BLD AUTO: 9.8 FL (ref 9.2–12.9)
POTASSIUM SERPL-SCNC: 3.4 MMOL/L (ref 3.5–5.1)
PROT SERPL-MCNC: 7.8 GM/DL (ref 6–8.4)
PROT UR QL STRIP: NEGATIVE
RBC # BLD AUTO: 4.32 M/UL (ref 4–5.4)
RBC #/AREA URNS HPF: 2 /HPF (ref 0–4)
RELATIVE EOSINOPHIL (OHS): 0.6 %
RELATIVE LYMPHOCYTE (OHS): 22.3 % (ref 18–48)
RELATIVE MONOCYTE (OHS): 12 % (ref 4–15)
RELATIVE NEUTROPHIL (OHS): 64.4 % (ref 38–73)
SODIUM SERPL-SCNC: 136 MMOL/L (ref 136–145)
SP GR UR STRIP: 1.01
TROPONIN I SERPL DL<=0.01 NG/ML-MCNC: <0.012 NG/ML (ref 0–0.03)
UROBILINOGEN UR STRIP-ACNC: NEGATIVE EU/DL
WBC # BLD AUTO: 7 K/UL (ref 3.9–12.7)
WBC #/AREA URNS HPF: 7 /HPF (ref 0–5)

## 2025-08-15 PROCEDURE — 96374 THER/PROPH/DIAG INJ IV PUSH: CPT | Mod: ER

## 2025-08-15 PROCEDURE — 63600175 PHARM REV CODE 636 W HCPCS: Mod: ER | Performed by: FAMILY MEDICINE

## 2025-08-15 PROCEDURE — 99285 EMERGENCY DEPT VISIT HI MDM: CPT | Mod: 25,ER

## 2025-08-15 PROCEDURE — 25000003 PHARM REV CODE 250: Mod: ER | Performed by: FAMILY MEDICINE

## 2025-08-15 PROCEDURE — 81001 URINALYSIS AUTO W/SCOPE: CPT | Mod: ER | Performed by: FAMILY MEDICINE

## 2025-08-15 PROCEDURE — 84484 ASSAY OF TROPONIN QUANT: CPT | Mod: ER | Performed by: FAMILY MEDICINE

## 2025-08-15 PROCEDURE — 96375 TX/PRO/DX INJ NEW DRUG ADDON: CPT | Mod: ER

## 2025-08-15 PROCEDURE — 99900035 HC TECH TIME PER 15 MIN (STAT): Mod: ER

## 2025-08-15 PROCEDURE — 83880 ASSAY OF NATRIURETIC PEPTIDE: CPT | Mod: ER | Performed by: FAMILY MEDICINE

## 2025-08-15 PROCEDURE — 93005 ELECTROCARDIOGRAM TRACING: CPT | Mod: ER

## 2025-08-15 PROCEDURE — 93010 ELECTROCARDIOGRAM REPORT: CPT | Mod: ,,, | Performed by: INTERNAL MEDICINE

## 2025-08-15 PROCEDURE — 85025 COMPLETE CBC W/AUTO DIFF WBC: CPT | Mod: ER | Performed by: FAMILY MEDICINE

## 2025-08-15 PROCEDURE — 94761 N-INVAS EAR/PLS OXIMETRY MLT: CPT | Mod: ER

## 2025-08-15 PROCEDURE — 82435 ASSAY OF BLOOD CHLORIDE: CPT | Mod: ER | Performed by: FAMILY MEDICINE

## 2025-08-15 RX ORDER — ONDANSETRON HYDROCHLORIDE 2 MG/ML
4 INJECTION, SOLUTION INTRAVENOUS
Status: COMPLETED | OUTPATIENT
Start: 2025-08-15 | End: 2025-08-15

## 2025-08-15 RX ORDER — BUTALBITAL, ACETAMINOPHEN AND CAFFEINE 50; 325; 40 MG/1; MG/1; MG/1
1 TABLET ORAL 3 TIMES DAILY PRN
Qty: 15 TABLET | Refills: 0 | Status: SHIPPED | OUTPATIENT
Start: 2025-08-15

## 2025-08-15 RX ORDER — FAMOTIDINE 10 MG/ML
20 INJECTION, SOLUTION INTRAVENOUS
Status: DISCONTINUED | OUTPATIENT
Start: 2025-08-15 | End: 2025-08-15 | Stop reason: HOSPADM

## 2025-08-15 RX ORDER — KETOROLAC TROMETHAMINE 30 MG/ML
15 INJECTION, SOLUTION INTRAMUSCULAR; INTRAVENOUS
Status: COMPLETED | OUTPATIENT
Start: 2025-08-15 | End: 2025-08-15

## 2025-08-15 RX ADMIN — ONDANSETRON 4 MG: 2 INJECTION INTRAMUSCULAR; INTRAVENOUS at 10:08

## 2025-08-15 RX ADMIN — POTASSIUM BICARBONATE 20 MEQ: 391 TABLET, EFFERVESCENT ORAL at 10:08

## 2025-08-15 RX ADMIN — KETOROLAC TROMETHAMINE 15 MG: 30 INJECTION, SOLUTION INTRAMUSCULAR; INTRAVENOUS at 10:08

## 2025-08-22 ENCOUNTER — PATIENT OUTREACH (OUTPATIENT)
Facility: OTHER | Age: 80
End: 2025-08-22
Payer: MEDICARE

## 2025-08-23 LAB
OHS QRS DURATION: 94 MS
OHS QTC CALCULATION: 440 MS

## 2025-09-05 RX ORDER — AMLODIPINE BESYLATE 5 MG/1
5 TABLET ORAL
Qty: 90 TABLET | Refills: 0 | Status: SHIPPED | OUTPATIENT
Start: 2025-09-05